# Patient Record
Sex: FEMALE | Race: WHITE | NOT HISPANIC OR LATINO | Employment: UNEMPLOYED | URBAN - NONMETROPOLITAN AREA
[De-identification: names, ages, dates, MRNs, and addresses within clinical notes are randomized per-mention and may not be internally consistent; named-entity substitution may affect disease eponyms.]

---

## 2023-02-03 PROBLEM — M25.572 LEFT ANKLE PAIN: Status: ACTIVE | Noted: 2023-02-03

## 2023-02-03 PROBLEM — M17.11 ARTHRITIS OF KNEE, RIGHT: Status: ACTIVE | Noted: 2023-02-03

## 2023-02-03 PROBLEM — G47.9 SLEEP DISORDER: Status: ACTIVE | Noted: 2023-02-03

## 2023-02-03 PROBLEM — Z86.16 HISTORY OF 2019 NOVEL CORONAVIRUS DISEASE (COVID-19): Status: ACTIVE | Noted: 2023-02-03

## 2023-02-03 PROBLEM — M10.9 GOUT: Status: ACTIVE | Noted: 2023-02-03

## 2023-02-03 PROBLEM — S99.912A LEFT ANKLE INJURY, INITIAL ENCOUNTER: Status: ACTIVE | Noted: 2023-02-03

## 2023-02-03 PROBLEM — K63.9 ARTHRITIS ASSOCIATED WITH INFLAMMATORY BOWEL DISEASE: Status: ACTIVE | Noted: 2023-02-03

## 2023-02-03 PROBLEM — M54.16 ACUTE LUMBAR RADICULOPATHY: Status: ACTIVE | Noted: 2023-02-03

## 2023-02-03 PROBLEM — R60.9: Status: ACTIVE | Noted: 2023-02-03

## 2023-02-03 PROBLEM — R00.0 SINUS TACHYCARDIA: Status: ACTIVE | Noted: 2023-02-03

## 2023-02-03 PROBLEM — R05.3 CHRONIC COUGH: Status: ACTIVE | Noted: 2023-02-03

## 2023-02-03 PROBLEM — G47.33 OBSTRUCTIVE SLEEP APNEA: Status: ACTIVE | Noted: 2023-02-03

## 2023-02-03 PROBLEM — R42 DIZZINESS: Status: ACTIVE | Noted: 2023-02-03

## 2023-02-03 PROBLEM — S82.832A CLOSED FRACTURE OF LEFT DISTAL FIBULA: Status: ACTIVE | Noted: 2023-02-03

## 2023-02-03 PROBLEM — M51.26 DISCOGENIC SYNDROME, LUMBAR: Status: ACTIVE | Noted: 2023-02-03

## 2023-02-03 PROBLEM — K64.4 EXTERNAL HEMORRHOIDS: Status: ACTIVE | Noted: 2023-02-03

## 2023-02-03 PROBLEM — M51.360 DISCOGENIC SYNDROME, LUMBAR: Status: ACTIVE | Noted: 2023-02-03

## 2023-02-03 PROBLEM — R60.9 DEPENDENT EDEMA: Status: ACTIVE | Noted: 2023-02-03

## 2023-02-03 PROBLEM — G25.81 RESTLESS LEGS SYNDROME: Status: ACTIVE | Noted: 2023-02-03

## 2023-02-03 PROBLEM — M25.569 PAIN IN KNEE: Status: ACTIVE | Noted: 2023-02-03

## 2023-02-03 PROBLEM — N85.8 FIBROSIS OF UTERUS: Status: ACTIVE | Noted: 2023-02-03

## 2023-02-03 PROBLEM — I10 BENIGN ESSENTIAL HYPERTENSION: Status: ACTIVE | Noted: 2023-02-03

## 2023-02-03 PROBLEM — K51.90 ULCERATIVE COLITIS (MULTI): Status: ACTIVE | Noted: 2023-02-03

## 2023-02-03 PROBLEM — N28.82 LEFT URETER DILATED: Status: ACTIVE | Noted: 2023-02-03

## 2023-02-03 PROBLEM — R29.898 WEAKNESS OF BOTH LOWER EXTREMITIES: Status: ACTIVE | Noted: 2023-02-03

## 2023-02-03 PROBLEM — M07.60 ARTHRITIS ASSOCIATED WITH INFLAMMATORY BOWEL DISEASE: Status: ACTIVE | Noted: 2023-02-03

## 2023-02-03 PROBLEM — F32.A DEPRESSION: Status: ACTIVE | Noted: 2023-02-03

## 2023-02-03 PROBLEM — R05.9 COUGH: Status: ACTIVE | Noted: 2023-02-03

## 2023-02-03 PROBLEM — T14.8XXA: Status: ACTIVE | Noted: 2023-02-03

## 2023-02-03 PROBLEM — K21.9 ESOPHAGEAL REFLUX: Status: ACTIVE | Noted: 2023-02-03

## 2023-02-03 PROBLEM — E78.00 HYPERCHOLESTEREMIA: Status: RESOLVED | Noted: 2023-02-03 | Resolved: 2023-02-03

## 2023-02-03 PROBLEM — K43.2 VENTRAL INCISIONAL HERNIA: Status: ACTIVE | Noted: 2023-02-03

## 2023-02-03 RX ORDER — LORATADINE 10 MG/1
1 TABLET ORAL AS NEEDED
COMMUNITY

## 2023-02-03 RX ORDER — ROSUVASTATIN CALCIUM 10 MG/1
10 TABLET, COATED ORAL NIGHTLY
COMMUNITY
Start: 2020-03-17 | End: 2023-04-03 | Stop reason: SDUPTHER

## 2023-02-03 RX ORDER — TIZANIDINE 4 MG/1
4 TABLET ORAL EVERY 8 HOURS PRN
COMMUNITY
Start: 2015-04-29 | End: 2023-10-13 | Stop reason: SDUPTHER

## 2023-02-03 RX ORDER — SULFASALAZINE 500 MG/1
500 TABLET ORAL 2 TIMES DAILY
COMMUNITY
Start: 2016-02-22 | End: 2023-10-13 | Stop reason: SDUPTHER

## 2023-02-03 RX ORDER — MUPIROCIN 20 MG/G
2 OINTMENT TOPICAL
COMMUNITY
Start: 2022-07-27 | End: 2023-10-13 | Stop reason: SDUPTHER

## 2023-02-03 RX ORDER — NEBIVOLOL 10 MG/1
1 TABLET ORAL DAILY
COMMUNITY
Start: 2016-08-04 | End: 2023-04-03 | Stop reason: SDUPTHER

## 2023-02-03 RX ORDER — PRAMIPEXOLE DIHYDROCHLORIDE 0.5 MG/1
0.5 TABLET ORAL 3 TIMES DAILY
COMMUNITY
Start: 2015-04-16 | End: 2023-04-03 | Stop reason: SDUPTHER

## 2023-04-02 PROBLEM — Z79.4 DIABETES MELLITUS, TYPE II, INSULIN DEPENDENT (MULTI): Status: ACTIVE | Noted: 2023-04-02

## 2023-04-02 PROBLEM — E11.9 DIABETES MELLITUS, TYPE II, INSULIN DEPENDENT (MULTI): Status: ACTIVE | Noted: 2023-04-02

## 2023-04-02 PROBLEM — Z87.892 HISTORY OF ANAPHYLAXIS: Status: ACTIVE | Noted: 2023-04-02

## 2023-04-02 PROBLEM — E78.00 HYPERCHOLESTEREMIA: Status: ACTIVE | Noted: 2023-04-02

## 2023-04-02 RX ORDER — INSULIN LISPRO 100 [IU]/ML
INJECTION, SOLUTION INTRAVENOUS; SUBCUTANEOUS
COMMUNITY
Start: 2021-04-30

## 2023-04-02 RX ORDER — CHOLECALCIFEROL (VITAMIN D3) 50 MCG
TABLET ORAL
COMMUNITY

## 2023-04-02 RX ORDER — LISINOPRIL 10 MG/1
1 TABLET ORAL DAILY
COMMUNITY
Start: 2013-07-12 | End: 2023-04-03 | Stop reason: SDUPTHER

## 2023-04-02 RX ORDER — FLUTICASONE PROPIONATE 50 MCG
2 SPRAY, SUSPENSION (ML) NASAL DAILY
COMMUNITY
Start: 2013-07-12

## 2023-04-02 RX ORDER — EPINEPHRINE 0.3 MG/.3ML
INJECTION SUBCUTANEOUS
COMMUNITY
Start: 2017-08-21 | End: 2023-10-13 | Stop reason: SDUPTHER

## 2023-04-02 RX ORDER — HYDROXYZINE HYDROCHLORIDE 25 MG/1
25 TABLET, FILM COATED ORAL
COMMUNITY
Start: 2022-10-03

## 2023-04-02 RX ORDER — ASPIRIN 81 MG/1
1 TABLET ORAL DAILY
COMMUNITY
Start: 2019-01-07

## 2023-04-02 RX ORDER — BUPROPION HYDROCHLORIDE 150 MG/1
1 TABLET, EXTENDED RELEASE ORAL EVERY 12 HOURS
COMMUNITY
Start: 2022-10-03 | End: 2023-04-03

## 2023-04-02 RX ORDER — FERROUS SULFATE 325(65) MG
1 TABLET ORAL DAILY
COMMUNITY
Start: 2022-10-03 | End: 2023-10-13 | Stop reason: ALTCHOICE

## 2023-04-02 RX ORDER — INSULIN GLARGINE 100 [IU]/ML
INJECTION, SOLUTION SUBCUTANEOUS
COMMUNITY
Start: 2021-04-30 | End: 2024-04-11 | Stop reason: WASHOUT

## 2023-04-02 RX ORDER — FUROSEMIDE 80 MG/1
0.5 TABLET ORAL
COMMUNITY
Start: 2016-04-19 | End: 2023-10-13 | Stop reason: SDUPTHER

## 2023-04-02 RX ORDER — LANSOPRAZOLE 30 MG/1
CAPSULE, DELAYED RELEASE ORAL
COMMUNITY

## 2023-04-02 RX ORDER — ACETAMINOPHEN 500 MG
500 TABLET ORAL
COMMUNITY
Start: 2022-04-26

## 2023-04-02 RX ORDER — HYDROXYZINE PAMOATE 25 MG/1
1 CAPSULE ORAL 3 TIMES DAILY
COMMUNITY
Start: 2022-10-03 | End: 2023-04-03 | Stop reason: ALTCHOICE

## 2023-04-02 RX ORDER — ALBUTEROL SULFATE 90 UG/1
AEROSOL, METERED RESPIRATORY (INHALATION)
COMMUNITY
Start: 2021-07-16 | End: 2023-10-13 | Stop reason: ALTCHOICE

## 2023-04-03 ENCOUNTER — OFFICE VISIT (OUTPATIENT)
Dept: PRIMARY CARE | Facility: CLINIC | Age: 58
End: 2023-04-03
Payer: COMMERCIAL

## 2023-04-03 VITALS
SYSTOLIC BLOOD PRESSURE: 140 MMHG | WEIGHT: 260 LBS | DIASTOLIC BLOOD PRESSURE: 64 MMHG | OXYGEN SATURATION: 98 % | BODY MASS INDEX: 47.55 KG/M2 | HEART RATE: 74 BPM

## 2023-04-03 DIAGNOSIS — E78.00 HYPERCHOLESTEREMIA: ICD-10-CM

## 2023-04-03 DIAGNOSIS — Z12.11 SCREENING FOR COLON CANCER: ICD-10-CM

## 2023-04-03 DIAGNOSIS — I10 BENIGN ESSENTIAL HYPERTENSION: Primary | ICD-10-CM

## 2023-04-03 DIAGNOSIS — L03.115 CELLULITIS OF RIGHT LOWER EXTREMITY: ICD-10-CM

## 2023-04-03 DIAGNOSIS — F31.77 BIPOLAR DISORDER, IN PARTIAL REMISSION, MOST RECENT EPISODE MIXED (MULTI): Chronic | ICD-10-CM

## 2023-04-03 DIAGNOSIS — G25.81 RESTLESS LEGS SYNDROME: ICD-10-CM

## 2023-04-03 PROBLEM — M54.16 ACUTE LUMBAR RADICULOPATHY: Status: RESOLVED | Noted: 2023-02-03 | Resolved: 2023-04-03

## 2023-04-03 PROBLEM — F32.A DEPRESSION: Status: RESOLVED | Noted: 2023-02-03 | Resolved: 2023-04-03

## 2023-04-03 PROBLEM — S99.912A LEFT ANKLE INJURY, INITIAL ENCOUNTER: Status: RESOLVED | Noted: 2023-02-03 | Resolved: 2023-04-03

## 2023-04-03 PROBLEM — M17.11 ARTHRITIS OF KNEE, RIGHT: Status: RESOLVED | Noted: 2023-02-03 | Resolved: 2023-04-03

## 2023-04-03 PROBLEM — R05.9 COUGH: Status: RESOLVED | Noted: 2023-02-03 | Resolved: 2023-04-03

## 2023-04-03 PROBLEM — S82.832A CLOSED FRACTURE OF LEFT DISTAL FIBULA: Status: RESOLVED | Noted: 2023-02-03 | Resolved: 2023-04-03

## 2023-04-03 PROBLEM — M25.572 LEFT ANKLE PAIN: Status: RESOLVED | Noted: 2023-02-03 | Resolved: 2023-04-03

## 2023-04-03 LAB
ALANINE AMINOTRANSFERASE (SGPT) (U/L) IN SER/PLAS: 14 U/L (ref 7–45)
ALBUMIN (G/DL) IN SER/PLAS: 3.6 G/DL (ref 3.4–5)
ALKALINE PHOSPHATASE (U/L) IN SER/PLAS: 91 U/L (ref 33–110)
ANION GAP IN SER/PLAS: 15 MMOL/L (ref 10–20)
ASPARTATE AMINOTRANSFERASE (SGOT) (U/L) IN SER/PLAS: 12 U/L (ref 9–39)
BASOPHILS (10*3/UL) IN BLOOD BY AUTOMATED COUNT: 0.07 X10E9/L (ref 0–0.1)
BASOPHILS/100 LEUKOCYTES IN BLOOD BY AUTOMATED COUNT: 1.2 % (ref 0–2)
BILIRUBIN TOTAL (MG/DL) IN SER/PLAS: 0.4 MG/DL (ref 0–1.2)
CALCIUM (MG/DL) IN SER/PLAS: 8.6 MG/DL (ref 8.6–10.3)
CARBON DIOXIDE, TOTAL (MMOL/L) IN SER/PLAS: 29 MMOL/L (ref 21–32)
CHLORIDE (MMOL/L) IN SER/PLAS: 100 MMOL/L (ref 98–107)
CREATININE (MG/DL) IN SER/PLAS: 0.95 MG/DL (ref 0.5–1.05)
EOSINOPHILS (10*3/UL) IN BLOOD BY AUTOMATED COUNT: 0.22 X10E9/L (ref 0–0.7)
EOSINOPHILS/100 LEUKOCYTES IN BLOOD BY AUTOMATED COUNT: 3.7 % (ref 0–6)
ERYTHROCYTE DISTRIBUTION WIDTH (RATIO) BY AUTOMATED COUNT: 12.3 % (ref 11.5–14.5)
ERYTHROCYTE MEAN CORPUSCULAR HEMOGLOBIN CONCENTRATION (G/DL) BY AUTOMATED: 32 G/DL (ref 32–36)
ERYTHROCYTE MEAN CORPUSCULAR VOLUME (FL) BY AUTOMATED COUNT: 88 FL (ref 80–100)
ERYTHROCYTES (10*6/UL) IN BLOOD BY AUTOMATED COUNT: 4.49 X10E12/L (ref 4–5.2)
GFR FEMALE: 69 ML/MIN/1.73M2
GLUCOSE (MG/DL) IN SER/PLAS: 237 MG/DL (ref 74–99)
HEMATOCRIT (%) IN BLOOD BY AUTOMATED COUNT: 39.7 % (ref 36–46)
HEMOGLOBIN (G/DL) IN BLOOD: 12.7 G/DL (ref 12–16)
IMMATURE GRANULOCYTES/100 LEUKOCYTES IN BLOOD BY AUTOMATED COUNT: 1 % (ref 0–0.9)
LEUKOCYTES (10*3/UL) IN BLOOD BY AUTOMATED COUNT: 6 X10E9/L (ref 4.4–11.3)
LYMPHOCYTES (10*3/UL) IN BLOOD BY AUTOMATED COUNT: 1.35 X10E9/L (ref 1.2–4.8)
LYMPHOCYTES/100 LEUKOCYTES IN BLOOD BY AUTOMATED COUNT: 22.4 % (ref 13–44)
MONOCYTES (10*3/UL) IN BLOOD BY AUTOMATED COUNT: 0.41 X10E9/L (ref 0.1–1)
MONOCYTES/100 LEUKOCYTES IN BLOOD BY AUTOMATED COUNT: 6.8 % (ref 2–10)
NEUTROPHILS (10*3/UL) IN BLOOD BY AUTOMATED COUNT: 3.91 X10E9/L (ref 1.2–7.7)
NEUTROPHILS/100 LEUKOCYTES IN BLOOD BY AUTOMATED COUNT: 64.9 % (ref 40–80)
PLATELETS (10*3/UL) IN BLOOD AUTOMATED COUNT: 255 X10E9/L (ref 150–450)
POTASSIUM (MMOL/L) IN SER/PLAS: 4.5 MMOL/L (ref 3.5–5.3)
PROTEIN TOTAL: 6.5 G/DL (ref 6.4–8.2)
SODIUM (MMOL/L) IN SER/PLAS: 139 MMOL/L (ref 136–145)
UREA NITROGEN (MG/DL) IN SER/PLAS: 12 MG/DL (ref 6–23)

## 2023-04-03 PROCEDURE — 1036F TOBACCO NON-USER: CPT | Performed by: FAMILY MEDICINE

## 2023-04-03 PROCEDURE — 99214 OFFICE O/P EST MOD 30 MIN: CPT | Performed by: FAMILY MEDICINE

## 2023-04-03 PROCEDURE — 3078F DIAST BP <80 MM HG: CPT | Performed by: FAMILY MEDICINE

## 2023-04-03 PROCEDURE — 3077F SYST BP >= 140 MM HG: CPT | Performed by: FAMILY MEDICINE

## 2023-04-03 PROCEDURE — 36415 COLL VENOUS BLD VENIPUNCTURE: CPT | Performed by: FAMILY MEDICINE

## 2023-04-03 PROCEDURE — 4010F ACE/ARB THERAPY RXD/TAKEN: CPT | Performed by: FAMILY MEDICINE

## 2023-04-03 PROCEDURE — 85025 COMPLETE CBC W/AUTO DIFF WBC: CPT

## 2023-04-03 PROCEDURE — 80053 COMPREHEN METABOLIC PANEL: CPT

## 2023-04-03 RX ORDER — ROSUVASTATIN CALCIUM 10 MG/1
10 TABLET, COATED ORAL NIGHTLY
Qty: 90 TABLET | Refills: 1 | Status: SHIPPED | OUTPATIENT
Start: 2023-04-03

## 2023-04-03 RX ORDER — PRAMIPEXOLE DIHYDROCHLORIDE 0.5 MG/1
0.5 TABLET ORAL 3 TIMES DAILY
Qty: 180 TABLET | Refills: 1 | Status: SHIPPED | OUTPATIENT
Start: 2023-04-03 | End: 2023-04-05 | Stop reason: SDUPTHER

## 2023-04-03 RX ORDER — LISINOPRIL 10 MG/1
10 TABLET ORAL DAILY
Qty: 90 TABLET | Refills: 1 | Status: SHIPPED | OUTPATIENT
Start: 2023-04-03 | End: 2023-10-13 | Stop reason: SDUPTHER

## 2023-04-03 RX ORDER — NEBIVOLOL 10 MG/1
10 TABLET ORAL DAILY
Qty: 90 TABLET | Refills: 1 | Status: SHIPPED | OUTPATIENT
Start: 2023-04-03

## 2023-04-03 RX ORDER — LAMOTRIGINE 200 MG/1
1 TABLET ORAL DAILY
COMMUNITY
Start: 2023-01-12

## 2023-04-03 RX ORDER — CEPHALEXIN 500 MG/1
500 CAPSULE ORAL 3 TIMES DAILY
Qty: 21 CAPSULE | Refills: 0 | Status: SHIPPED | OUTPATIENT
Start: 2023-04-03 | End: 2023-04-10

## 2023-04-03 RX ORDER — DULOXETIN HYDROCHLORIDE 60 MG/1
1 CAPSULE, DELAYED RELEASE ORAL 2 TIMES DAILY
COMMUNITY
Start: 2022-08-12 | End: 2023-04-03

## 2023-04-03 RX ORDER — QUETIAPINE FUMARATE 200 MG/1
400 TABLET, FILM COATED ORAL NIGHTLY
COMMUNITY
Start: 2023-01-12

## 2023-04-03 NOTE — PATIENT INSTRUCTIONS
Contact Dr Vásquez about the leg and swelling   Take the furosemide 40 mg a day as needed for swelling   Lets have you take the Cephalexin  500 mg three times a day for a week for the cellulitis on the leg       Call DR Huffman about the colonoscopy  - they might call you       I will have your test results on your secureach card within a week  - if not - call me         [facility] 's   (Breast Cancer and Cervical Cancer Prevention )  phone number  is [phone].     This is a program who will cover the costs of breast and pelvic exams, pap smears, and mammograms, as well as the follow up to any abnormalities found with those tests.      They  help women who are uninsured or under insured.     If you haven't yet,  please give them a call to see if you qualify for the program.    If you do,  they will have paperwork for you to fill out and send back.     If you have qualified for the program,  they will have to set up all appointments for you.     If you are [place], the [facility] ( an [facility]) sponsors periodic clinics with [facility].  You can ask the [facility] people if you could have an appointment there  OR  in the office with me.       If you are non-[place], your visit will only be with me, here in the office.      If you already have your order for a mammogram,  be sure to contact them to set up the mammogram appointment.     Be sure to take your mammogram order with you to your appointment, along with any [facility] paperwork you may have.     If you have already had your testing, you will always hear about your results.  So if 3 weeks go by, and you have not heard anything, please let either myself or the people at [facility] know.     And,  please tell any of your friends who may not have insurance about this fantastic program!         PLEASE PLAN YOUR NEXT APPOINTMENT WITH ME IN      4 months       ;   ALWAYS BE SURE TO CALL AT LEAST 6 WEEKS AHEAD OF WHEN YOU NEED THE APPOINTMENT TO BE SCHEDULED.      IF  I HAVE TOLD YOU TO GET LABS AHEAD OF THE APPOINTMENT WITH ME TO GO OVER TOGETHER AT OUR APPOINTMENT,  BE SURE TO MAKE A LAB APPOINTMENT A FEW DAYS AHEAD OF YOUR VISIT WITH ME, AND LEAVE ME A MESSAGE CLOSE TO THE LAB APPT DATE TO REMIND ME TO PLACE ORDERS FOR THOSE LABS.      THANK YOU!

## 2023-04-03 NOTE — PROGRESS NOTES
Subjective   Patient ID: Yessy Ortega is a 57 y.o. female who presents for Follow-up.    HPI     LOV  10/2022      Updates and concerns:       At our appt in OCT  -  Dx with Bipoar  -  started on Lamictal 200 mg every day  and Seroquel   400 hs  - she feels so much better.   Mood much more stable.   Feeling a lot better     Recently saw her endocrinologist -  A1C to 8.4% -   Sugars are finally getting under better control -   She is able to control how she eats better       Needs to get in for colonoscopy       When she works - in the PM she feels flulike  -   Gets chills   Going on for a year     When she rests -  she feels better       Chronic issues reviewed today :      Diabetes T2 -  seeing DR Shah  A1C  8.4%   ON Lantus - 80 units AM   Humalog - per scale 30 units up to 50 units - may need correction at time   Farxiga started too - OFF FARXIGA - COST   Meter is Freestyle Erika - LOVES IT   Eye doctor - there Summer 2022   Feet - doing well - checked by Endocrine   Working with dietician      ON ASA 81 mg a day      HTN - Bystolic 10 mg a day; Lisinopril 10 mg QD  States BPs at home are bettter -  Friday  110/72      Hyperchol - Rosuvastatin 10 mg - tolerating well  simvastatin 20 mg CAUSED ACHES         SHE DID HAVE COVID 19 IN MAY 2020 - CT of chest May 13/2021- less than 10 % lung involvement now   several other findings -   7 mm non -obstructing kidney stone;   Calcified nodule on thyroid     HER SOB IS MUCH BETTER - NEEDS ALBUTEROL OCCAS      Edema -   Saw Dr Mohseni - Dx Lymphedema - blisters on legs - hx of cellulitis   Was in a unaboot -  and having her veins worked on  -   Right was done -  but swelling worse  - blisters not going away  -       On furosemide 40 QD  - has not been taking it per DR WHARTON  Has sequential compression devices QD     Bipolar d/o Dx  2022  psych with Francisco, counselor monthly   Now on Lamictal and Seroquel  - doing much better  Hydroxyzine prn      Tizanadine for back  pain - 4 mg hs usually      Sleep study 1/2020 - WNL      RLS - pramipexole 0.5 mg -   Usually taking 2 hs now   And on iron daily      Arthritis of IBD (UC) - saw Lumapas in the past - on sulfasalazine - 2 pills BID   was on prednisone - could not take it   Colonoscopy - last one was about 10 years ago  - saw DiBlasio in the past   Has not seen GI  in years   - last one in chart 2008      GERD - prevacid daily      Taking Vit D 3 - 5000 IU a day      WWE -   9/2020 with [facility] - pap and mamm fine         At June appt 2022  - saw GARRY FOR Postmenopausal Uterine BLEEDING AND SHE WAS GETTING A BIOPSY DONE ON THE UTERUS         Flu shot -  UTD  Pneumovax - had not done yet - declines for now  shingrix - declines for now   COVID - not yet - declines it      Cor Calcium score zero 7/2019         Review of Systems    Objective   /64 (BP Location: Right arm, Patient Position: Sitting, BP Cuff Size: Large adult)   Pulse 74   Wt 118 kg (260 lb)   SpO2 98%   BMI 47.55 kg/m²     Physical Exam  Constitutional:       General: She is not in acute distress.     Appearance: Normal appearance. She is obese.   HENT:      Head: Normocephalic and atraumatic.      Nose: Nose normal.      Mouth/Throat:      Pharynx: No posterior oropharyngeal erythema.   Eyes:      Extraocular Movements: Extraocular movements intact.      Conjunctiva/sclera: Conjunctivae normal.      Pupils: Pupils are equal, round, and reactive to light.   Cardiovascular:      Rate and Rhythm: Normal rate and regular rhythm.      Heart sounds: Normal heart sounds. No murmur heard.  Pulmonary:      Effort: Pulmonary effort is normal. No respiratory distress.      Breath sounds: Normal breath sounds. No wheezing.   Musculoskeletal:      Cervical back: Neck supple.      Right lower leg: Edema present.      Comments: 2 plus edema LE and mild erythema and tenderness right LE    Lymphadenopathy:      Cervical: No cervical adenopathy.   Skin:     General: Skin is  warm and dry.      Findings: No rash.   Neurological:      General: No focal deficit present.      Mental Status: She is alert.   Psychiatric:         Mood and Affect: Mood normal.         Thought Content: Thought content normal.         Assessment/Plan   Problem List Items Addressed This Visit          Nervous    Restless legs syndrome    Relevant Medications    pramipexole (Mirapex) 0.5 mg tablet       Circulatory    Benign essential hypertension - Primary    Relevant Medications    lisinopril 10 mg tablet    nebivolol (Bystolic) 10 mg tablet    Other Relevant Orders    CBC and Auto Differential    Comprehensive Metabolic Panel       Musculoskeletal    Cellulitis of right lower extremity     Start Cephalexin again - she said it worked well in the past -   Working with venous doc          Relevant Medications    cephalexin (Keflex) 500 mg capsule       Other    Bipolar disorder, in partial remission, most recent episode mixed (CMS/HCC) (Chronic)    Hypercholesteremia    Relevant Medications    rosuvastatin (Crestor) 10 mg tablet    Screening for colon cancer    Relevant Orders    Colonoscopy     Generally doing much better -     No changes in meds     We discussed at visit any disease processes that were of concern as well as the risks, benefits and instructions of any new medication provided.    See orders and discussion section for information handed to patient on their Clinical Summary.   Patient (and/or caretaker of patient if present)  stated all questions were answered, and they voiced understanding of instructions.

## 2023-04-05 DIAGNOSIS — G25.81 RESTLESS LEGS SYNDROME: ICD-10-CM

## 2023-04-05 RX ORDER — PRAMIPEXOLE DIHYDROCHLORIDE 0.5 MG/1
0.5 TABLET ORAL 3 TIMES DAILY
Qty: 270 TABLET | Refills: 1 | Status: SHIPPED | OUTPATIENT
Start: 2023-04-05 | End: 2023-12-18

## 2023-09-18 PROBLEM — E66.8 OTHER OBESITY: Status: ACTIVE | Noted: 2023-09-18

## 2023-09-18 PROBLEM — E11.65 TYPE 2 DIABETES MELLITUS WITH HYPERGLYCEMIA (MULTI): Status: ACTIVE | Noted: 2023-09-18

## 2023-09-18 PROBLEM — E66.89 OTHER OBESITY: Status: ACTIVE | Noted: 2023-09-18

## 2023-09-18 PROBLEM — M81.0 AGE-RELATED OSTEOPOROSIS WITHOUT CURRENT PATHOLOGICAL FRACTURE: Status: ACTIVE | Noted: 2023-09-18

## 2023-09-18 RX ORDER — ROSUVASTATIN CALCIUM 20 MG/1
1 TABLET, COATED ORAL
COMMUNITY
End: 2023-10-13 | Stop reason: SINTOL

## 2023-09-18 RX ORDER — QUETIAPINE 200 MG/1
1 TABLET, FILM COATED, EXTENDED RELEASE ORAL NIGHTLY
COMMUNITY
End: 2023-10-13 | Stop reason: SDUPTHER

## 2023-09-18 RX ORDER — TRAZODONE HYDROCHLORIDE 50 MG/1
TABLET ORAL NIGHTLY
COMMUNITY
End: 2023-10-13

## 2023-09-18 RX ORDER — DULOXETIN HYDROCHLORIDE 60 MG/1
1 CAPSULE, DELAYED RELEASE ORAL 2 TIMES DAILY
COMMUNITY
End: 2023-10-13

## 2023-09-18 RX ORDER — BUPROPION HYDROCHLORIDE 150 MG/1
1 TABLET, EXTENDED RELEASE ORAL
COMMUNITY
End: 2023-10-13

## 2023-09-18 RX ORDER — TIZANIDINE 4 MG/1
1 TABLET ORAL NIGHTLY
COMMUNITY
End: 2023-10-13 | Stop reason: SDUPTHER

## 2023-09-18 RX ORDER — HYDROXYZINE PAMOATE 25 MG/1
1 CAPSULE ORAL NIGHTLY
COMMUNITY
End: 2023-10-13 | Stop reason: SDUPTHER

## 2023-09-18 RX ORDER — SULFASALAZINE 500 MG/1
1 TABLET ORAL DAILY
COMMUNITY
End: 2023-10-13 | Stop reason: ALTCHOICE

## 2023-10-13 ENCOUNTER — OFFICE VISIT (OUTPATIENT)
Dept: PRIMARY CARE | Facility: CLINIC | Age: 58
End: 2023-10-13
Payer: COMMERCIAL

## 2023-10-13 VITALS
OXYGEN SATURATION: 96 % | SYSTOLIC BLOOD PRESSURE: 128 MMHG | BODY MASS INDEX: 47.01 KG/M2 | WEIGHT: 257 LBS | DIASTOLIC BLOOD PRESSURE: 86 MMHG | HEART RATE: 67 BPM

## 2023-10-13 DIAGNOSIS — Z79.4 TYPE 2 DIABETES MELLITUS WITH HYPERGLYCEMIA, WITH LONG-TERM CURRENT USE OF INSULIN (MULTI): ICD-10-CM

## 2023-10-13 DIAGNOSIS — R60.9 DEPENDENT EDEMA: ICD-10-CM

## 2023-10-13 DIAGNOSIS — L03.115 CELLULITIS OF RIGHT LOWER EXTREMITY: Primary | ICD-10-CM

## 2023-10-13 DIAGNOSIS — K63.9 ARTHRITIS ASSOCIATED WITH INFLAMMATORY BOWEL DISEASE: ICD-10-CM

## 2023-10-13 DIAGNOSIS — I10 BENIGN ESSENTIAL HYPERTENSION: ICD-10-CM

## 2023-10-13 DIAGNOSIS — E11.65 TYPE 2 DIABETES MELLITUS WITH HYPERGLYCEMIA, WITH LONG-TERM CURRENT USE OF INSULIN (MULTI): ICD-10-CM

## 2023-10-13 DIAGNOSIS — Z87.892 HISTORY OF ANAPHYLAXIS: ICD-10-CM

## 2023-10-13 DIAGNOSIS — M51.26 DISCOGENIC SYNDROME, LUMBAR: ICD-10-CM

## 2023-10-13 DIAGNOSIS — M07.60 ARTHRITIS ASSOCIATED WITH INFLAMMATORY BOWEL DISEASE: ICD-10-CM

## 2023-10-13 LAB
ALBUMIN SERPL BCP-MCNC: 4.1 G/DL (ref 3.4–5)
ALP SERPL-CCNC: 102 U/L (ref 33–110)
ALT SERPL W P-5'-P-CCNC: 19 U/L (ref 7–45)
ANION GAP SERPL CALC-SCNC: 17 MMOL/L (ref 10–20)
AST SERPL W P-5'-P-CCNC: 16 U/L (ref 9–39)
BASOPHILS # BLD AUTO: 0.06 X10*3/UL (ref 0–0.1)
BASOPHILS NFR BLD AUTO: 0.8 %
BILIRUB SERPL-MCNC: 0.4 MG/DL (ref 0–1.2)
BUN SERPL-MCNC: 21 MG/DL (ref 6–23)
CALCIUM SERPL-MCNC: 8.9 MG/DL (ref 8.6–10.3)
CHLORIDE SERPL-SCNC: 101 MMOL/L (ref 98–107)
CHOLEST SERPL-MCNC: 136 MG/DL (ref 0–199)
CHOLESTEROL/HDL RATIO: 2.7
CO2 SERPL-SCNC: 27 MMOL/L (ref 21–32)
CREAT SERPL-MCNC: 1.02 MG/DL (ref 0.5–1.05)
EOSINOPHIL # BLD AUTO: 0.17 X10*3/UL (ref 0–0.7)
EOSINOPHIL NFR BLD AUTO: 2.3 %
ERYTHROCYTE [DISTWIDTH] IN BLOOD BY AUTOMATED COUNT: 12 % (ref 11.5–14.5)
GFR SERPL CREATININE-BSD FRML MDRD: 64 ML/MIN/1.73M*2
GLUCOSE SERPL-MCNC: 154 MG/DL (ref 74–99)
HCT VFR BLD AUTO: 41.8 % (ref 36–46)
HDLC SERPL-MCNC: 50.1 MG/DL
HGB BLD-MCNC: 13.2 G/DL (ref 12–16)
IMM GRANULOCYTES # BLD AUTO: 0.04 X10*3/UL (ref 0–0.7)
IMM GRANULOCYTES NFR BLD AUTO: 0.5 % (ref 0–0.9)
LDLC SERPL CALC-MCNC: 71 MG/DL
LYMPHOCYTES # BLD AUTO: 1.68 X10*3/UL (ref 1.2–4.8)
LYMPHOCYTES NFR BLD AUTO: 23.1 %
MCH RBC QN AUTO: 28.9 PG (ref 26–34)
MCHC RBC AUTO-ENTMCNC: 31.6 G/DL (ref 32–36)
MCV RBC AUTO: 92 FL (ref 80–100)
MONOCYTES # BLD AUTO: 0.43 X10*3/UL (ref 0.1–1)
MONOCYTES NFR BLD AUTO: 5.9 %
NEUTROPHILS # BLD AUTO: 4.9 X10*3/UL (ref 1.2–7.7)
NEUTROPHILS NFR BLD AUTO: 67.4 %
NON HDL CHOLESTEROL: 86 MG/DL (ref 0–149)
NRBC BLD-RTO: 0 /100 WBCS (ref 0–0)
PLATELET # BLD AUTO: 286 X10*3/UL (ref 150–450)
PMV BLD AUTO: 11 FL (ref 7.5–11.5)
POTASSIUM SERPL-SCNC: 4.7 MMOL/L (ref 3.5–5.3)
PROT SERPL-MCNC: 7.2 G/DL (ref 6.4–8.2)
RBC # BLD AUTO: 4.57 X10*6/UL (ref 4–5.2)
SODIUM SERPL-SCNC: 140 MMOL/L (ref 136–145)
TRIGL SERPL-MCNC: 73 MG/DL (ref 0–149)
TSH SERPL-ACNC: 1.8 MIU/L (ref 0.44–3.98)
VLDL: 15 MG/DL (ref 0–40)
WBC # BLD AUTO: 7.3 X10*3/UL (ref 4.4–11.3)

## 2023-10-13 PROCEDURE — 36415 COLL VENOUS BLD VENIPUNCTURE: CPT

## 2023-10-13 PROCEDURE — 3061F NEG MICROALBUMINURIA REV: CPT | Performed by: FAMILY MEDICINE

## 2023-10-13 PROCEDURE — 3048F LDL-C <100 MG/DL: CPT | Performed by: FAMILY MEDICINE

## 2023-10-13 PROCEDURE — 82570 ASSAY OF URINE CREATININE: CPT

## 2023-10-13 PROCEDURE — 84443 ASSAY THYROID STIM HORMONE: CPT

## 2023-10-13 PROCEDURE — 3079F DIAST BP 80-89 MM HG: CPT | Performed by: FAMILY MEDICINE

## 2023-10-13 PROCEDURE — 80053 COMPREHEN METABOLIC PANEL: CPT

## 2023-10-13 PROCEDURE — 85025 COMPLETE CBC W/AUTO DIFF WBC: CPT

## 2023-10-13 PROCEDURE — 4010F ACE/ARB THERAPY RXD/TAKEN: CPT | Performed by: FAMILY MEDICINE

## 2023-10-13 PROCEDURE — 3074F SYST BP LT 130 MM HG: CPT | Performed by: FAMILY MEDICINE

## 2023-10-13 PROCEDURE — 82043 UR ALBUMIN QUANTITATIVE: CPT

## 2023-10-13 PROCEDURE — 90686 IIV4 VACC NO PRSV 0.5 ML IM: CPT | Performed by: FAMILY MEDICINE

## 2023-10-13 PROCEDURE — 90471 IMMUNIZATION ADMIN: CPT | Performed by: FAMILY MEDICINE

## 2023-10-13 PROCEDURE — 80061 LIPID PANEL: CPT

## 2023-10-13 PROCEDURE — 1036F TOBACCO NON-USER: CPT | Performed by: FAMILY MEDICINE

## 2023-10-13 PROCEDURE — 99214 OFFICE O/P EST MOD 30 MIN: CPT | Performed by: FAMILY MEDICINE

## 2023-10-13 RX ORDER — SULFASALAZINE 500 MG/1
500 TABLET ORAL 4 TIMES DAILY
Qty: 240 TABLET | Refills: 5 | Status: SHIPPED | OUTPATIENT
Start: 2023-10-13

## 2023-10-13 RX ORDER — TIZANIDINE 4 MG/1
4 TABLET ORAL NIGHTLY PRN
Qty: 90 TABLET | Refills: 1 | Status: SHIPPED | OUTPATIENT
Start: 2023-10-13

## 2023-10-13 RX ORDER — FUROSEMIDE 40 MG/1
40 TABLET ORAL DAILY PRN
Qty: 90 TABLET | Refills: 3 | Status: SHIPPED | OUTPATIENT
Start: 2023-10-13

## 2023-10-13 RX ORDER — CEPHALEXIN 500 MG/1
500 CAPSULE ORAL 3 TIMES DAILY
Qty: 30 CAPSULE | Refills: 0 | Status: SHIPPED | OUTPATIENT
Start: 2023-10-13 | End: 2023-10-23

## 2023-10-13 RX ORDER — EPINEPHRINE 0.3 MG/.3ML
1 INJECTION SUBCUTANEOUS ONCE AS NEEDED
Qty: 2 EACH | Refills: 2 | Status: SHIPPED | OUTPATIENT
Start: 2023-10-13

## 2023-10-13 RX ORDER — LISINOPRIL 10 MG/1
10 TABLET ORAL DAILY
Qty: 90 TABLET | Refills: 1 | Status: SHIPPED | OUTPATIENT
Start: 2023-10-13 | End: 2024-04-11 | Stop reason: SDUPTHER

## 2023-10-13 RX ORDER — MUPIROCIN 20 MG/G
OINTMENT TOPICAL
Qty: 30 G | Refills: 1 | Status: SHIPPED | OUTPATIENT
Start: 2023-10-13 | End: 2023-12-28 | Stop reason: WASHOUT

## 2023-10-13 RX ORDER — FLASH GLUCOSE SENSOR
KIT MISCELLANEOUS
COMMUNITY
Start: 2023-10-09 | End: 2024-06-06

## 2023-10-13 NOTE — PATIENT INSTRUCTIONS
"For the leg -   Lets try the cephalexin -   Use the mupirocin oint -    Non stick gauze pads -   And then gauze wrap -   Change daily -     Appt with Thalia       You can take the sulfasalazine 500 mg -  2 pills up to 4 times a day when your arthritis is flared     Flu shot today     I will have your test results on your secureach card within a week  - if not  - call me       For General Healthy Nutrition    (Remember - NOT A DIET!   Diets are only good for class reunions.)    These are my general good nutrition recommendations for most people.   I use the term \" diet \"  in these instructions to mean your overall nutrition - how you eat and drink.   If we talked about something different during your visit with me,  other than what is written below,  follow that advice instead.       For most people,  eating healthier means getting less added sugar and less processed foods in your diet    The fresher the better.    Added sugar is now a part of the nutrition label on manufactured food, so you can keep an eye on it easier.    But basically,  foods and beverages  that contain regular sugar and corn syrup are the main sources of added sugars.  Eating as little of these foods as you can is best.   One shocking example of the epidemic of added sugar is soda.    One can of regular soda contains about as much added sugar as 3 regular size doughnuts!     The other issue with processed foods is the amount of processed grains they contain , such as white flour.    This is also something you want to try to limit in your diet.     But, grain products are very important for your nutrition.    Whole grains are better for your body.     Cutting back on white breads, traditional pasta, baked goods, white rice,  and processed cereals will be healthier for you.   The better choices include whole grain breads,  whole wheat pasta,  brown rice, quinoa, barley, steel cut  or rolled oats.   If you eat cereal for breakfast, try to look for " "one made with whole grains and less sugar.   There are many people who have a problem with gluten, for a large variety of reasons.    Generally,  products made with wheat flour , barley or rye are the primary source of gluten.       Cutting back on saturated fats is important.    You want the majority of the meat that you eat to be chicken, fish or turkey.   Baked or broiled is best -  fried adds too much fat.    There are healthy fats that are important - fat is important for holding down appetite, vitamin absorption and several metabolic processes in the body.  Monounsaturated fats raise HDL (good cholesterol) and lower LDL (bad cholesterol).   Olive oil, peanut oil, nuts, seeds, and avocados are great sources of the good fats.       Ideas are:   Trade sour cream dip for hummus (which is rich in olive oil) or guacamole; use veggies or whole-wheat chips to dip.    Nuts are an excellent source of protein and healthy fats.   Tree nuts are the best kind, such as almonds or walnuts.   Just be careful - they are high in calories, so stick to a serving size.  (Most are about 200 calories for a 1/4 cup)      Proteins are very important for your body, and they also hold down your appetite.   Try to have protein with every meal.    These generally are meats, nuts, many beans, legumes, eggs, and dairy.   You will find protein in whole grain products and some green vegetables have a little too.     When you have dairy (if you can - many people are lactose intolerant) try to make it low fat.    Ideas are 1% milk, lowfat yogurt or cheeses, low fat cottage cheese.   I don't generally recommend FAT FREE because they often contain artificial products to improve taste, and the fat helps hold down your appetite.   If you are lactose intolerant, try to see if taking Lactaid before having dairy helps.      Fresh fruits and vegetables are VERY important.  The brighter the better.   Many vegetables are considered \"Free Foods\" - meaning " you can eat as much as you want, and it does not matter.  These include tomatoes, cucumbers, celery, peppers, all the various lettuces and kale - to name a few.   Potatoes, corn and peas are starchy, so do have more calories, but are still healthy - you just want to watch the amount of them you eat.       Fruits are full of wonderful nutrition.   They contain natural sugar called fructose, so eating them in moderation is best.   Diabetics may need to pay careful attention to how their body reacts to the sugar.  Some fruits might drastically increase their blood sugar.      Eating smaller meals with a couple of small snacks is better for your metabolism than not eating for long amounts of time  (breakfast is very important).   Trying to avoid large meals is helpful too.    Eating like this helps keep your appetite down and keeps you in burning metabolism rather than storage metabolism so your body will use the calories you eat.       I do not tell people to stop eating sweets or snack foods - just limit the amounts you have.  The less the better.   Pay attention to serving sizes, and treat them as a treat.        Foods like doughnuts, pop tarts, sugar cereals, cookies  ARE NOT GOOD FOR BREAKFAST.   They are loaded with sugar and will cause you to be hungrier in the day and often not feel well.    Caffeine needs to be limited - no more than 2 servings a day.  Some people can't have any at all.    (if you have any sleep or anxiety issues - stop the caffeine)   Coffee, many teas, many sodas, energy drinks, almost any diet supplement,  and chocolate all contain caffeine.      Water is important.   For most people, 8   x  8 ounces  a day are needed.  This may vary for some health issues.    If you need to be on a low sodium diet, that means looking at labels and eating only 1000 - 2000 mg of sodium a day.    Calcium intake is important.  3 servings of a high calcium food or drink a day is recommended.   This is usually a  cup of milk, a cup of yogurt, an ounce of a hard cheese or 1.5 ounces of a soft cheese are the usual servings.   There are other high calcium foods - including soy or almond milk, broccoli,  almonds, dark green leafy vegetable.   Make sure you are not getting more than 1000  - 1200 mg of total calcium a day (unless you have been told you need more by a doctor).    Vitamin D 3 is important to absorb the calcium and for your immune system.   For children, 400 IU a day is recommended.   For adults - 800 - 5000 IU a day  is recommended.  (Often the amount needed is individualized for adults - be sure to ask how much is right for you)    Physical activity is very important for good health.    Finding activities that give you regular exercise is very important for good health.  Try to find exercise you enjoy doing on a regular basis.    30 minutes at least 5 days a week of a good cardiovascular exercise is recommended.   That means something that gets your heart rate going faster than your usual baseline and you can find yourself breathing harder than usual while you are exercising.  If you have not done any exercise in a long time,  make sure you ask if its safe for you to start,  and be sure to gradually work up to your goal.      If you need to lose weight,  following these recommendations will help you.   And if you are doing all of this and still not losing weight, then its likely just the amount of food you are eating.   Learn to cut back on portion sizes.  Using smaller plates may help.  Healthy weight loss is  only about a pound a week.   You have to remember that whatever you do to lose the weight, you must be prepared to keep it up for life for the weight to stay off.     A lot of people have a lot of luck with using something like a fit bit,  or a program where you keep track of all of your calories that you eat and what you burn off in the day.       PLEASE PLAN YOUR NEXT APPOINTMENT WITH ME IN      4 months        ;   ALWAYS BE SURE TO CALL AT LEAST 6 WEEKS AHEAD OF WHEN YOU NEED THE APPOINTMENT TO BE SCHEDULED.    WE ARE NO LONGER DOING LAB DRAWS WITHOUT AN OFFICE APPOINTMENT.     IF YOU NEED LABS DONE, IT WILL HAVE TO BE AT THE SAME TIME as  THE OFFICE APPOINTMENT.  THANK YOU.

## 2023-10-13 NOTE — PROGRESS NOTES
Subjective   Patient ID: Yessy Ortega is a 58 y.o. female who presents for Follow-up (Yearly check up for blood work. Wound on right lower leg  from bumping it on a wooden chair about 3 weeks, looking pretty bad.).    HPI     LOV    4/2023       Updates and concerns:       She has been seeing endocrine for diabetes     Right lower leg - bumped her right leg -   Small scratches -   Now its getting nasty  - lots of blisters and oozing  Typically gets cephalexin     Known chronic edema -   Has seen DR Vásquez in the past -   Has not been wearing her support stockings       In Sept  - was in Manic phase -   - improving    But she can tell she is in her depressive mode now   Working with psych       Chronic issues reviewed today :      Diabetes T2 -  seeing DR Collins            Last A1C - 8.0    Meds -   ON Lantus - 80 units AM   Humalog - per scale 30 units up to 50 units - may need correction at time   Farxiga started too - OFF FARXIGA - COST   Meter is Freestyle Erika - LOVES IT     Eye doctor - just there - getting DR   Feet - doing well   Stopped working with diabetes educator      ON ASA 81 mg a day      HTN - Bystolic 10 mg a day; Lisinopril 10 mg every day  Stable     Hyperchol - Rosuvastatin 10 mg - tolerating well      (simvastatin 20 mg CAUSED ACHES)      Cor Calcium score zero 7/2019         SHE DID HAVE COVID 19 IN MAY 2020 - CT of chest May 13/2021- less than 10 % lung involvement now  and several other findings -   7 mm non -obstructing kidney stone;   Calcified nodule on thyroid     HER SOB IS MUCH BETTER - not using albuterol      Chronic Edema -   Saw Dr Mohseni - Dx Lymphedema -     ON furosemide 40 every other day   compression devices every day - BID      Bipolar d/o Dx  2022  psych with Francisco, counselor monthly   Now on Lamictal and Seroquel    Hydroxyzine prn     Seeing counselor every 4 weeks         Sleep study 1/2020 - WNL      RLS - pramipexole 0.5 mg -   Usually taking 2 hs now         Arthritis of IBD (UC) - saw Lumapas in the past - on sulfasalazine - 2 pills BID  -   She was told can take 2 QID if inflammation high   Having a lot of joint pain lately   was on prednisone - could not take it     Tizanadine for back pain - 4 mg hs usually       Colonoscopy -   - saw Bill in the past   Has not seen GI  in years   - last one in chart 2008   Still did not go  - has order      GERD - prevacid daily      Taking Vit D 3 - 5000 IU a day       [facility] -      WWE -   9/2020 with [facility] - pap and mamm fine      At June appt 2022  - saw GARRY FOR Postmenopausal Uterine BLEEDING  -   Had Bx done - told it was fine -   No bleeding since         Flu shot - would like one today   Pneumovax - had not done yet - declines for now  shingrix - declines for now   COVID - not yet - declines it            Review of Systems    Objective   /86 (BP Location: Right arm, Patient Position: Sitting, BP Cuff Size: Large adult)   Pulse 67   Wt 117 kg (257 lb)   SpO2 96%   BMI 47.01 kg/m²     Physical Exam  Vitals and nursing note reviewed.   Constitutional:       General: She is not in acute distress.     Appearance: Normal appearance. She is obese.   HENT:      Head: Normocephalic and atraumatic.      Nose: Nose normal.      Mouth/Throat:      Pharynx: No posterior oropharyngeal erythema.   Eyes:      Extraocular Movements: Extraocular movements intact.      Conjunctiva/sclera: Conjunctivae normal.      Pupils: Pupils are equal, round, and reactive to light.   Cardiovascular:      Rate and Rhythm: Normal rate and regular rhythm.      Heart sounds: Normal heart sounds. No murmur heard.  Pulmonary:      Effort: Pulmonary effort is normal. No respiratory distress.      Breath sounds: Normal breath sounds. No wheezing.   Musculoskeletal:         General: Swelling present.      Cervical back: Neck supple.      Right lower leg: Edema present.      Left lower leg: Edema present.        Legs:       Comments: 2 plus edema  LE and mild erythema and tenderness right LE    Lymphadenopathy:      Cervical: No cervical adenopathy.   Skin:     General: Skin is warm and dry.      Findings: No rash.      Comments: On her right LE - she has several superficial vesicles that are raised and white and 2 area that are about 1.5 cm that is white -   The surrounding tissue is erythematous   Neurological:      General: No focal deficit present.      Mental Status: She is alert.   Psychiatric:         Mood and Affect: Mood normal.         Thought Content: Thought content normal.         Assessment/Plan   Problem List Items Addressed This Visit          High    Arthritis associated with inflammatory bowel disease    Relevant Medications    sulfaSALAzine (Azulfidine) 500 mg tablet    Benign essential hypertension    Relevant Medications    lisinopril 10 mg tablet    Dependent edema    Relevant Medications    furosemide (Lasix) 40 mg tablet    Discogenic syndrome, lumbar    Relevant Medications    tiZANidine (Zanaflex) 4 mg tablet       Medium    History of anaphylaxis    Relevant Medications    EPINEPHrine 0.3 mg/0.3 mL injection syringe    Cellulitis of right lower extremity - Primary    Relevant Medications    mupirocin (Bactroban) 2 % ointment    cephalexin (Keflex) 500 mg capsule    Type 2 diabetes mellitus with hyperglycemia (CMS/HCC)    Relevant Orders    Albumin , Urine Random (Completed)    CBC and Auto Differential (Completed)    Comprehensive Metabolic Panel (Completed)    Lipid Panel (Completed)    Thyroid Stimulating Hormone (Completed)     Superficial wounds on right less with cellulitis -   Tx with cephalexin   She will call DR Vásquez again     Mood is still pretty labile - working with psych     Working with endocrine too     I told her to increase sulfasalazine for the pain she is having - that has worked in the past     We discussed at visit any disease processes that were of concern as well as the risks, benefits and instructions of  any new medication provided.    See orders and discussion section for information handed to patient on their Clinical Summary.   Patient (and/or caretaker of patient if present)  stated all questions were answered, and they voiced understanding of instructions.

## 2023-10-14 PROBLEM — E11.319 DIABETIC RETINOPATHY OF BOTH EYES ASSOCIATED WITH TYPE 2 DIABETES MELLITUS (MULTI): Status: ACTIVE | Noted: 2023-10-14

## 2023-10-14 LAB
CREAT UR-MCNC: 73.1 MG/DL
MICROALBUMIN UR-MCNC: 19.8 MG/L
MICROALBUMIN/CREAT UR: 27.1 UG/MG CREAT

## 2023-12-18 DIAGNOSIS — G25.81 RESTLESS LEGS SYNDROME: ICD-10-CM

## 2023-12-18 RX ORDER — PRAMIPEXOLE DIHYDROCHLORIDE 0.5 MG/1
TABLET ORAL
Qty: 270 TABLET | Refills: 3 | Status: SHIPPED | OUTPATIENT
Start: 2023-12-18 | End: 2023-12-20 | Stop reason: SDUPTHER

## 2023-12-20 DIAGNOSIS — G25.81 RESTLESS LEGS SYNDROME: ICD-10-CM

## 2023-12-20 RX ORDER — PRAMIPEXOLE DIHYDROCHLORIDE 0.5 MG/1
TABLET ORAL
Qty: 270 TABLET | Refills: 3 | Status: SHIPPED | OUTPATIENT
Start: 2023-12-20

## 2023-12-27 ENCOUNTER — OFFICE VISIT (OUTPATIENT)
Dept: ENDOCRINOLOGY | Facility: CLINIC | Age: 58
End: 2023-12-27
Payer: COMMERCIAL

## 2023-12-27 VITALS
HEART RATE: 69 BPM | DIASTOLIC BLOOD PRESSURE: 59 MMHG | SYSTOLIC BLOOD PRESSURE: 130 MMHG | BODY MASS INDEX: 47.19 KG/M2 | WEIGHT: 258 LBS

## 2023-12-27 DIAGNOSIS — E11.65 TYPE 2 DIABETES MELLITUS WITH HYPERGLYCEMIA, WITH LONG-TERM CURRENT USE OF INSULIN (MULTI): Primary | ICD-10-CM

## 2023-12-27 DIAGNOSIS — Z79.4 TYPE 2 DIABETES MELLITUS WITH HYPERGLYCEMIA, WITH LONG-TERM CURRENT USE OF INSULIN (MULTI): Primary | ICD-10-CM

## 2023-12-27 DIAGNOSIS — I10 BENIGN ESSENTIAL HYPERTENSION: ICD-10-CM

## 2023-12-27 DIAGNOSIS — E78.00 HYPERCHOLESTEREMIA: ICD-10-CM

## 2023-12-27 LAB — POC HEMOGLOBIN A1C: 7.8 % (ref 4.2–6.5)

## 2023-12-27 PROCEDURE — 99213 OFFICE O/P EST LOW 20 MIN: CPT | Performed by: NURSE PRACTITIONER

## 2023-12-27 PROCEDURE — 3078F DIAST BP <80 MM HG: CPT | Performed by: NURSE PRACTITIONER

## 2023-12-27 PROCEDURE — 4010F ACE/ARB THERAPY RXD/TAKEN: CPT | Performed by: NURSE PRACTITIONER

## 2023-12-27 PROCEDURE — 95251 CONT GLUC MNTR ANALYSIS I&R: CPT | Performed by: NURSE PRACTITIONER

## 2023-12-27 PROCEDURE — 3061F NEG MICROALBUMINURIA REV: CPT | Performed by: NURSE PRACTITIONER

## 2023-12-27 PROCEDURE — 1036F TOBACCO NON-USER: CPT | Performed by: NURSE PRACTITIONER

## 2023-12-27 PROCEDURE — 83036 HEMOGLOBIN GLYCOSYLATED A1C: CPT | Performed by: NURSE PRACTITIONER

## 2023-12-27 PROCEDURE — 3048F LDL-C <100 MG/DL: CPT | Performed by: NURSE PRACTITIONER

## 2023-12-27 PROCEDURE — 3075F SYST BP GE 130 - 139MM HG: CPT | Performed by: NURSE PRACTITIONER

## 2023-12-27 ASSESSMENT — PAIN SCALES - GENERAL: PAINLEVEL: 4

## 2023-12-27 NOTE — PROGRESS NOTES
HPI:  Presents for follow up/metabolic management 57 yo with DM Type 2 diagnosed in her 40s. History Bipolar depression, HTN, HLD, Lymph edema (compression stocking/pump at night). Current A1C 7.8% was 8.0%. Patient testing sugars 6 times day with Erika 14 day sensor/reader. No lows. Following carb controlled diet somewhat and know reasonable carb allowances. Patient able to afford their medications. Patient is exercising/walking longer distance with a walker.         -CGM Freestyle Erika 14 day/reader. Currently on insulin checking BS at least 4 xs a day adjustments made based on readings/frequent visits to manage.         -INSULIN Lantus 83 units Humalog base 30 ISF 10>130         -Metformin intolerant GLP1 intolerant SGLT2 INTOLERANT could not afford TZD unable/edema DPP4 consider         -HTN Lisinopril daily at goal         -STATIN Rousuvastatin LDL           Erika report downloaded and attached time in target increased to 63%, no lows, avg bs 168. Waking up 160s. Spikes with lunch and has improved with dinner.     /59 (BP Location: Left arm, Patient Position: Sitting)   Pulse 69   Wt 117 kg (258 lb)   BMI 47.19 kg/m²     Labs:  Lab Results   Component Value Date    WBC 7.3 10/13/2023    NRBC 0.0 10/13/2023    RBC 4.57 10/13/2023    HGB 13.2 10/13/2023    HCT 41.8 10/13/2023     10/13/2023     Lab Results   Component Value Date    CALCIUM 8.9 10/13/2023    AST 16 10/13/2023    ALKPHOS 102 10/13/2023    BILITOT 0.4 10/13/2023    PROT 7.2 10/13/2023    ALBUMIN 4.1 10/13/2023     10/13/2023    K 4.7 10/13/2023     10/13/2023    CO2 27 10/13/2023    ANIONGAP 17 10/13/2023    BUN 21 10/13/2023    CREATININE 1.02 10/13/2023    GLUCOSE 154 (H) 10/13/2023    ALT 19 10/13/2023    EGFR 64 10/13/2023     Lab Results   Component Value Date    CHOL 136 10/13/2023    TRIG 73 10/13/2023    HDL 50.1 10/13/2023    LDLCALC 71 10/13/2023     Lab Results   Component Value Date    MICROALBCREA 27.1  "10/13/2023     Lab Results   Component Value Date    TSH 1.80 10/13/2023     No results found for: \"[id number]\"  Lab Results   Component Value Date    HGBA1C 7.8 (A) 12/27/2023         Current Outpatient Medications:     acetaminophen (Tylenol) 500 mg tablet, Take 1 tablet (500 mg) by mouth. EVERY 4 TO 6 HOURS AS NEEDED., Disp: , Rfl:     aspirin 81 mg EC tablet, Take 1 tablet (81 mg) by mouth once daily., Disp: , Rfl:     cholecalciferol (Vitamin D-3) 50 MCG (2000 UT) tablet, Vitamin D3 50 MCG (2000 UT) Oral Tablet  Refills: 0     Active, Disp: , Rfl:     EPINEPHrine 0.3 mg/0.3 mL injection syringe, Inject 0.3 mL (0.3 mg) into the muscle 1 time if needed for anaphylaxis for up to 6 doses. Use as directed for anaphylaxis reaction, Disp: 2 each, Rfl: 2    fluticasone (Flonase) 50 mcg/actuation nasal spray, Administer 2 sprays into each nostril once daily., Disp: , Rfl:     FreeStyle Erika 14 Day Sensor kit, use as directed CHANGE EVERY 14 DAYS, Disp: , Rfl:     furosemide (Lasix) 40 mg tablet, Take 1 tablet (40 mg) by mouth once daily as needed (edema)., Disp: 90 tablet, Rfl: 3    hydrOXYzine HCL (Atarax) 25 mg tablet, Take 1 tablet (25 mg) by mouth. EVERY 4 TO 6 HOURS AS NEEDED FOR ANXIETY., Disp: , Rfl:     insulin glargine (Lantus) 100 unit/mL (3 mL) pen, Inject under the skin., Disp: , Rfl:     insulin lispro (HumaLOG) 100 unit/mL injection, HumaLOG KwikPen 100 UNIT/ML Subcutaneous Solution Pen-injector  Refills: 0      Start : 30-Apr-2021  Active 3 ML Pen, Disp: , Rfl:     lamoTRIgine (LaMICtal) 200 mg tablet, Take 1 tablet (200 mg) by mouth once daily., Disp: , Rfl:     lansoprazole (Prevacid) 30 mg DR capsule, Prevacid 30 MG Oral Capsule Delayed Release  Refills: 0     Active, Disp: , Rfl:     lisinopril 10 mg tablet, Take 1 tablet (10 mg) by mouth once daily., Disp: 90 tablet, Rfl: 1    loratadine (Claritin) 10 mg tablet, Take 1 tablet (10 mg) by mouth if needed for allergies., Disp: , Rfl:     " multivit-min/iron/folic acid/K (ADULTS MULTIVITAMIN ORAL), Take by mouth. As directed, Disp: , Rfl:     nebivolol (Bystolic) 10 mg tablet, Take 1 tablet (10 mg) by mouth once daily., Disp: 90 tablet, Rfl: 1    pramipexole (Mirapex) 0.5 mg tablet, TAKE 1 TABLET BY MOUTH  IN THE MORNING AND 1 TAB IN THE EVENING AND 1 TAB BEFORE BEDTIME, Disp: 270 tablet, Rfl: 3    QUEtiapine (SEROquel) 200 mg tablet, Take 2 tablets (400 mg) by mouth once daily at bedtime., Disp: , Rfl:     rosuvastatin (Crestor) 10 mg tablet, Take 1 tablet (10 mg) by mouth once daily at bedtime., Disp: 90 tablet, Rfl: 1    sulfaSALAzine (Azulfidine) 500 mg tablet, Take 1 tablet (500 mg) by mouth 4 times a day., Disp: 240 tablet, Rfl: 5    tiZANidine (Zanaflex) 4 mg tablet, Take 1 tablet (4 mg) by mouth as needed at bedtime for muscle spasms., Disp: 90 tablet, Rfl: 1    Review of Systems:  Cardiology: Lightheadedness-denies.  Chest pain-denies.  Leg edema-denies.  Palpitations-denies.  Respiratory: Cough-denies. Shortness of breath-denies.  Wheezing-denies.  Gastroenterology: Constipation-denies.  Diarrhea-denies.  Heartburn-denies.  Endocrinology: Cold intolerance-denies.  Heat intolerance-denies.  Sweats-denies.  Neurology: Headache-denies.  Tremor-denies.  Neuropathy in extremities-denies.  Psychology: Low energy-denies.  Irritability-denies.  Sleep disturbances-denies.    Physical Exam:  General Appearance: pleasant, cooperative, no acute distress  HEENT: no chemosis, no proptosis, no lid lag, no lid retraction  Neck: no lymphadenopathy, no thyromegaly, no dominant thyroid nodules  Heart: no murmurs, regular rate and rhythm, S1 and S2  Lungs: no wheezes, no rhonci, no rales  Extremities: no lower extremity swelling    Assessment and Plan:  1. Type 2 diabetes mellitus with hyperglycemia, with long-term current use of insulin (CMS/[facility])  -continue 83 units  -increased base with dinner to 30 other meals still 25 units base  -very challenging to get A1c  closer 7% but she is trying    - POCT glycosylated hemoglobin (Hb A1C) manually resulted    2. Benign essential hypertension  -stable    3. Hypercholesteremia  -LDL target < 70  -tolerates statin      Follow Up: CNP 4 months    -labs/tests/notes reviewed  -reviewed and counseled patient on medication monitoring and side effects

## 2023-12-27 NOTE — PATIENT INSTRUCTIONS
INSULIN Lantus 83 units CONTINUE AT THIS DOSE     MEAL TIME Humalog BASE  30 UNITS WITH BREAKFAST AND LUNCH WITH SCALE     FOR DINNER 36 UNITS PLUS THE SCALE

## 2024-01-15 DIAGNOSIS — Z79.4 TYPE 2 DIABETES MELLITUS WITH HYPERGLYCEMIA, WITH LONG-TERM CURRENT USE OF INSULIN (MULTI): Primary | ICD-10-CM

## 2024-01-15 DIAGNOSIS — E11.65 TYPE 2 DIABETES MELLITUS WITH HYPERGLYCEMIA, WITH LONG-TERM CURRENT USE OF INSULIN (MULTI): Primary | ICD-10-CM

## 2024-01-15 PROCEDURE — RXMED WILLOW AMBULATORY MEDICATION CHARGE

## 2024-01-15 RX ORDER — INSULIN LISPRO 100 [IU]/ML
INJECTION, SOLUTION INTRAVENOUS; SUBCUTANEOUS
Qty: 90 ML | Refills: 3 | Status: SHIPPED | OUTPATIENT
Start: 2024-01-15 | End: 2025-01-14

## 2024-01-15 RX ORDER — INSULIN GLARGINE 100 [IU]/ML
INJECTION, SOLUTION SUBCUTANEOUS
Qty: 75 ML | Refills: 3 | Status: SHIPPED | OUTPATIENT
Start: 2024-01-15 | End: 2025-01-14

## 2024-01-16 ENCOUNTER — PHARMACY VISIT (OUTPATIENT)
Dept: PHARMACY | Facility: CLINIC | Age: 59
End: 2024-01-16
Payer: COMMERCIAL

## 2024-02-07 ENCOUNTER — TELEPHONE (OUTPATIENT)
Dept: ENDOCRINOLOGY | Facility: CLINIC | Age: 59
End: 2024-02-07
Payer: COMMERCIAL

## 2024-02-07 NOTE — TELEPHONE ENCOUNTER
Patient is having a colonoscopy on Tuesday Feb.13 she is taking humolog(on sliding scale) and 80units of Lantus.She is only allowed clear liquids on Monday please advise

## 2024-02-13 ENCOUNTER — APPOINTMENT (OUTPATIENT)
Dept: PRIMARY CARE | Facility: CLINIC | Age: 59
End: 2024-02-13
Payer: COMMERCIAL

## 2024-04-02 ENCOUNTER — OFFICE VISIT (OUTPATIENT)
Dept: ENDOCRINOLOGY | Facility: CLINIC | Age: 59
End: 2024-04-02
Payer: COMMERCIAL

## 2024-04-02 VITALS
BODY MASS INDEX: 47.55 KG/M2 | SYSTOLIC BLOOD PRESSURE: 134 MMHG | HEART RATE: 74 BPM | DIASTOLIC BLOOD PRESSURE: 70 MMHG | WEIGHT: 260 LBS

## 2024-04-02 DIAGNOSIS — E78.00 HYPERCHOLESTEREMIA: ICD-10-CM

## 2024-04-02 DIAGNOSIS — Z79.4 TYPE 2 DIABETES MELLITUS WITH HYPERGLYCEMIA, WITH LONG-TERM CURRENT USE OF INSULIN (MULTI): Primary | ICD-10-CM

## 2024-04-02 DIAGNOSIS — E11.65 TYPE 2 DIABETES MELLITUS WITH HYPERGLYCEMIA, WITH LONG-TERM CURRENT USE OF INSULIN (MULTI): Primary | ICD-10-CM

## 2024-04-02 DIAGNOSIS — I10 BENIGN ESSENTIAL HYPERTENSION: ICD-10-CM

## 2024-04-02 LAB — POC HEMOGLOBIN A1C: 7.6 % (ref 4.2–6.5)

## 2024-04-02 PROCEDURE — 83036 HEMOGLOBIN GLYCOSYLATED A1C: CPT | Performed by: NURSE PRACTITIONER

## 2024-04-02 PROCEDURE — 3075F SYST BP GE 130 - 139MM HG: CPT | Performed by: NURSE PRACTITIONER

## 2024-04-02 PROCEDURE — 99213 OFFICE O/P EST LOW 20 MIN: CPT | Performed by: NURSE PRACTITIONER

## 2024-04-02 PROCEDURE — 4010F ACE/ARB THERAPY RXD/TAKEN: CPT | Performed by: NURSE PRACTITIONER

## 2024-04-02 PROCEDURE — 3078F DIAST BP <80 MM HG: CPT | Performed by: NURSE PRACTITIONER

## 2024-04-02 RX ORDER — PEN NEEDLE, DIABETIC 30 GX3/16"
1 NEEDLE, DISPOSABLE MISCELLANEOUS 2 TIMES DAILY
Qty: 200 EACH | Refills: 3 | Status: SHIPPED | OUTPATIENT
Start: 2024-04-02

## 2024-04-02 ASSESSMENT — ENCOUNTER SYMPTOMS: DEPRESSION: 0

## 2024-04-02 ASSESSMENT — PAIN SCALES - GENERAL: PAINLEVEL: 6

## 2024-04-02 NOTE — PATIENT INSTRUCTIONS
CHANGE THE BASE AT DINNER FROM 35 UNITS TO 32 UNITS     INCREASE THE LANTUS TO 85 UNITS DAILY

## 2024-04-02 NOTE — PROGRESS NOTES
HPI   Presents for follow up/metabolic management 57 yo with DM Type 2 diagnosed in her 40s. History Bipolar depression, HTN, HLD, Lymph edema (compression stocking/pump at night). Current A1C 7.6% was 7.8%. Patient testing sugars 6 times day with Erika 14 day sensor/reader. No lows. Following carb controlled diet somewhat and know reasonable carb allowances. Patient able to afford their medications. Patient is exercising/walking longer distance with a walker.         -CGM Freestyle Erika 14 day/reader. Currently on insulin checking BS at least 4 xs a day adjustments made based on readings/frequent visits to manage.         -INSULIN Lantus 83 units Humalog base 30 (breakfast/lunch) 36 dinner ISF 10>130         -Metformin intolerant GLP1 intolerant SGLT2 INTOLERANT could not afford TZD unable/edema DPP4 consider         -HTN Lisinopril Bystolic daily at goal         -STATIN Rousuvastatin LDL           Could not download Erika 14 day reader, reviewed time in target 60% 14 days      Current Outpatient Medications:     acetaminophen (Tylenol) 500 mg tablet, Take 1 tablet (500 mg) by mouth. EVERY 4 TO 6 HOURS AS NEEDED., Disp: , Rfl:     aspirin 81 mg EC tablet, Take 1 tablet (81 mg) by mouth once daily., Disp: , Rfl:     cholecalciferol (Vitamin D-3) 50 MCG (2000 UT) tablet, Vitamin D3 50 MCG (2000 UT) Oral Tablet  Refills: 0     Active, Disp: , Rfl:     EPINEPHrine 0.3 mg/0.3 mL injection syringe, Inject 0.3 mL (0.3 mg) into the muscle 1 time if needed for anaphylaxis for up to 6 doses. Use as directed for anaphylaxis reaction, Disp: 2 each, Rfl: 2    fluticasone (Flonase) 50 mcg/actuation nasal spray, Administer 2 sprays into each nostril once daily., Disp: , Rfl:     FreeStyle Erika 14 Day Sensor kit, use as directed CHANGE EVERY 14 DAYS, Disp: , Rfl:     furosemide (Lasix) 40 mg tablet, Take 1 tablet (40 mg) by mouth once daily as needed (edema)., Disp: 90 tablet, Rfl: 3    hydrOXYzine HCL (Atarax) 25 mg tablet,  "Take 1 tablet (25 mg) by mouth. EVERY 4 TO 6 HOURS AS NEEDED FOR ANXIETY., Disp: , Rfl:     insulin glargine (Lantus Solostar U-100 Insulin) 100 unit/mL (3 mL) pen, INJECT UP TO 80 UNITS UNDER THE SKIN ONE TIME DAILY AS DIRECTED, Disp: 75 mL, Rfl: 3    insulin glargine (Lantus) 100 unit/mL (3 mL) pen, Inject under the skin., Disp: , Rfl:     insulin lispro (HumaLOG KwikPen Insulin) 100 unit/mL injection, INJECT SUBCUTANEOUSLY UP  UNITS ONE TIME DAILY, Disp: 90 mL, Rfl: 3    insulin lispro (HumaLOG) 100 unit/mL injection, HumaLOG KwikPen 100 UNIT/ML Subcutaneous Solution Pen-injector  Refills: 0      Start : 30-Apr-2021  Active 3 ML Pen, Disp: , Rfl:     lamoTRIgine (LaMICtal) 200 mg tablet, Take 1 tablet (200 mg) by mouth once daily., Disp: , Rfl:     lansoprazole (Prevacid) 30 mg DR capsule, Prevacid 30 MG Oral Capsule Delayed Release  Refills: 0     Active, Disp: , Rfl:     lisinopril 10 mg tablet, Take 1 tablet (10 mg) by mouth once daily., Disp: 90 tablet, Rfl: 1    loratadine (Claritin) 10 mg tablet, Take 1 tablet (10 mg) by mouth if needed for allergies., Disp: , Rfl:     multivit-min/iron/folic acid/K (ADULTS MULTIVITAMIN ORAL), Take by mouth. As directed, Disp: , Rfl:     nebivolol (Bystolic) 10 mg tablet, Take 1 tablet (10 mg) by mouth once daily., Disp: 90 tablet, Rfl: 1    pen needle, diabetic (BD Griselda 2nd Gen Pen Needle) 32 gauge x 5/32\" needle, Use 1 Pen needle 2 times a day., Disp: 200 each, Rfl: 3    pramipexole (Mirapex) 0.5 mg tablet, TAKE 1 TABLET BY MOUTH  IN THE MORNING AND 1 TAB IN THE EVENING AND 1 TAB BEFORE BEDTIME, Disp: 270 tablet, Rfl: 3    QUEtiapine (SEROquel) 200 mg tablet, Take 2 tablets (400 mg) by mouth once daily at bedtime., Disp: , Rfl:     rosuvastatin (Crestor) 10 mg tablet, Take 1 tablet (10 mg) by mouth once daily at bedtime., Disp: 90 tablet, Rfl: 1    sulfaSALAzine (Azulfidine) 500 mg tablet, Take 1 tablet (500 mg) by mouth 4 times a day., Disp: 240 tablet, Rfl: 5    " tiZANidine (Zanaflex) 4 mg tablet, Take 1 tablet (4 mg) by mouth as needed at bedtime for muscle spasms., Disp: 90 tablet, Rfl: 1      Allergies as of 04/02/2024 - Reviewed 04/02/2024   Allergen Reaction Noted    Ozempic [semaglutide] Diarrhea 04/03/2023    Janumet [sitagliptin phos-metformin] Diarrhea 04/03/2023    Toujeo max u-300 solostar [insulin glargine u-300 conc] Diarrhea 04/03/2023    Dulaglutide Other 02/03/2023    Metformin Diarrhea 02/03/2023    Penicillins Other 02/03/2023    Pioglitazone hcl Hives and Other 09/18/2023    Venlafaxine Diarrhea 02/03/2023         Review of Systems   Cardiology: Lightheadedness-denies.  Chest pain-denies.  Leg edema-denies.  Palpitations-denies.  Respiratory: Cough-denies. Shortness of breath-denies.  Wheezing-denies.  Gastroenterology: Constipation-denies.  Diarrhea-denies.  Heartburn-denies.  Endocrinology: Cold intolerance-denies.  Heat intolerance-denies.  Sweats-denies.  Neurology: Headache-denies.  Tremor-denies.  Neuropathy in extremities-denies.  Psychology: Low energy-denies.  Irritability-denies.  Sleep disturbances-denies.      /70 (BP Location: Right arm, Patient Position: Sitting)   Pulse 74   Wt 118 kg (260 lb)   BMI 47.55 kg/m²       Labs:  Lab Results   Component Value Date    WBC 7.3 10/13/2023    NRBC 0.0 10/13/2023    RBC 4.57 10/13/2023    HGB 13.2 10/13/2023    HCT 41.8 10/13/2023     10/13/2023     Lab Results   Component Value Date    CALCIUM 8.9 10/13/2023    AST 16 10/13/2023    ALKPHOS 102 10/13/2023    BILITOT 0.4 10/13/2023    PROT 7.2 10/13/2023    ALBUMIN 4.1 10/13/2023     10/13/2023    K 4.7 10/13/2023     10/13/2023    CO2 27 10/13/2023    ANIONGAP 17 10/13/2023    BUN 21 10/13/2023    CREATININE 1.02 10/13/2023    GLUCOSE 154 (H) 10/13/2023    ALT 19 10/13/2023    EGFR 64 10/13/2023     Lab Results   Component Value Date    CHOL 136 10/13/2023    TRIG 73 10/13/2023    HDL 50.1 10/13/2023    LDLCALC 71 10/13/2023  "    Lab Results   Component Value Date    MICROALBCREA 27.1 10/13/2023     Lab Results   Component Value Date    TSH 1.80 10/13/2023       Lab Results   Component Value Date    HGBA1C 7.6 (A) 04/02/2024         Physical Exam   General Appearance: pleasant, cooperative, no acute distress  HEENT: no chemosis, no proptosis, no lid lag, no lid retraction  Neck: no lymphadenopathy, no thyromegaly, no dominant thyroid nodules  Heart: no murmurs, regular rate and rhythm, S1 and S2  Lungs: no wheezes, no rhonci, no rales  Extremities: lymphedema BLE      Assessment/Plan   1. Type 2 diabetes mellitus with hyperglycemia, with long-term current use of insulin (CMS/[facility])  -continues to improve A1c  -increased 85 units basal dose  -very challenging to get A1c closer 7% but she is trying  -would benefit from newer Erika reader with alarms   -reviewed target BS    - POCT glycosylated hemoglobin (Hb A1C) manually resulted  - pen needle, diabetic (BD Griselda 2nd Gen Pen Needle) 32 gauge x 5/32\" needle; Use 1 Pen needle 2 times a day.  Dispense: 200 each; Refill: 3    2. Benign essential hypertension  -stable    3. Hypercholesteremia  -tolerates statin      Follow Up: 4 months CNP    -labs/tests/notes reviewed  -reviewed and counseled patient on medication monitoring and side effects  "

## 2024-04-11 ENCOUNTER — OFFICE VISIT (OUTPATIENT)
Dept: PRIMARY CARE | Facility: CLINIC | Age: 59
End: 2024-04-11
Payer: COMMERCIAL

## 2024-04-11 VITALS
OXYGEN SATURATION: 96 % | SYSTOLIC BLOOD PRESSURE: 132 MMHG | BODY MASS INDEX: 47.37 KG/M2 | WEIGHT: 259 LBS | HEART RATE: 87 BPM | DIASTOLIC BLOOD PRESSURE: 66 MMHG

## 2024-04-11 DIAGNOSIS — K51.819 OTHER ULCERATIVE COLITIS WITH COMPLICATION (MULTI): ICD-10-CM

## 2024-04-11 DIAGNOSIS — F31.77 BIPOLAR DISORDER, IN PARTIAL REMISSION, MOST RECENT EPISODE MIXED (MULTI): ICD-10-CM

## 2024-04-11 DIAGNOSIS — E11.319 DIABETIC RETINOPATHY OF BOTH EYES WITHOUT MACULAR EDEMA ASSOCIATED WITH TYPE 2 DIABETES MELLITUS, UNSPECIFIED RETINOPATHY SEVERITY (MULTI): ICD-10-CM

## 2024-04-11 DIAGNOSIS — R40.0 DAYTIME SOMNOLENCE: ICD-10-CM

## 2024-04-11 DIAGNOSIS — R05.3 CHRONIC COUGH: Primary | ICD-10-CM

## 2024-04-11 DIAGNOSIS — I10 BENIGN ESSENTIAL HYPERTENSION: ICD-10-CM

## 2024-04-11 PROCEDURE — 99214 OFFICE O/P EST MOD 30 MIN: CPT | Performed by: FAMILY MEDICINE

## 2024-04-11 PROCEDURE — 3078F DIAST BP <80 MM HG: CPT | Performed by: FAMILY MEDICINE

## 2024-04-11 PROCEDURE — 1036F TOBACCO NON-USER: CPT | Performed by: FAMILY MEDICINE

## 2024-04-11 PROCEDURE — 3075F SYST BP GE 130 - 139MM HG: CPT | Performed by: FAMILY MEDICINE

## 2024-04-11 PROCEDURE — 4010F ACE/ARB THERAPY RXD/TAKEN: CPT | Performed by: FAMILY MEDICINE

## 2024-04-11 RX ORDER — ALBUTEROL SULFATE 90 UG/1
2 AEROSOL, METERED RESPIRATORY (INHALATION) EVERY 4 HOURS PRN
Qty: 8 G | Refills: 5 | Status: SHIPPED | OUTPATIENT
Start: 2024-04-11 | End: 2025-04-11

## 2024-04-11 RX ORDER — LISINOPRIL 10 MG/1
10 TABLET ORAL DAILY
Qty: 90 TABLET | Refills: 1 | Status: SHIPPED | OUTPATIENT
Start: 2024-04-11

## 2024-04-11 NOTE — PATIENT INSTRUCTIONS
Get back to taking the furosemide every other day   Start back on Vit D   Use the albuterol as needed for shortness of breath     I will place order for home sleep test  I have place an order for a sleep study.    They should be calling you to set that up.   If they do not within a week - call [phone] -   Tell them you have an order from me  -  and did not hear from them.     After I get the result back, I will have the  call you to set up a phone appt to go over it.     And then we talk about likely getting you a CPAP/Bipap or Autopap machine pending the results of the test.     We would send an order to a DME company.  After you get the machine - you have to have a follow up appt with me   30 - 90 days after you start your machine to be sure the insurance will pay for it.    IF you cannot tolerate it - I need to hear from you before that.           [facility] 's   (Breast Cancer and Cervical Cancer Prevention )  phone number  is [phone].     This is a program who will cover the costs of breast and pelvic exams, pap smears, and mammograms, as well as the follow up to any abnormalities found with those tests.      They  help women who are uninsured or under insured.     If you haven't yet,  please give them a call to see if you qualify for the program.    If you do,  they will have paperwork for you to fill out and send back.     If you have qualified for the program,  they will have to set up all appointments for you.     If you are [place], the [facility] ( an [place]) sponsors periodic clinics with [facility].  You can ask the [facility] people if you could have an appointment there  OR  in the office with me.       If you are non-[place], your visit will only be with me, here in the office.      If you already have your order for a mammogram,  be sure to contact them to set up the mammogram appointment.     Be sure to take your mammogram order with you to your appointment, along  "with any [facility] paperwork you may have.     If you have already had your testing, you will always hear about your results.  So if 3 weeks go by, and you have not heard anything, please let either myself or the people at [facility] know.           For General Healthy Nutrition    (Remember - NOT A DIET!   Diets are only good for class reunions.)    These are my general good nutrition recommendations for most people.   I use the term \" diet \"  in these instructions to mean your overall nutrition - how you eat and drink.   If we talked about something different during your visit with me,  other than what is written below,  follow that advice instead.       For most people,  eating healthier means getting less added sugar and less processed foods in your diet    The fresher the better.    Added sugar is now a part of the nutrition label on manufactured food, so you can keep an eye on it easier.    But basically,  foods and beverages  that contain regular sugar and corn syrup are the main sources of added sugars.  Eating as little of these foods as you can is best.   One shocking example of the epidemic of added sugar is soda.    One can of regular soda contains about as much added sugar as 3 regular size doughnuts!     The other issue with processed foods is the amount of processed grains they contain , such as white flour.    This is also something you want to try to limit in your diet.     But, grain products are very important for your nutrition.    Whole grains are better for your body.     Cutting back on white breads, traditional pasta, baked goods, white rice,  and processed cereals will be healthier for you.   The better choices include whole grain breads,  whole wheat pasta,  brown rice, quinoa, barley, steel cut  or rolled oats.   If you eat cereal for breakfast, try to look for one made with whole grains and less sugar.   There are many people who have a problem with gluten, for a large variety of reasons.    " "Generally,  products made with wheat flour , barley or rye are the primary source of gluten.       Cutting back on saturated fats is important.    You want the majority of the meat that you eat to be chicken, fish or turkey.   Baked or broiled is best -  fried adds too much fat.    There are healthy fats that are important - fat is important for holding down appetite, vitamin absorption and several metabolic processes in the body.  Monounsaturated fats raise HDL (good cholesterol) and lower LDL (bad cholesterol).   Olive oil, peanut oil, nuts, seeds, and avocados are great sources of the good fats.       Ideas are:   Trade sour cream dip for hummus (which is rich in olive oil) or guacamole; use veggies or whole-wheat chips to dip.    Nuts are an excellent source of protein and healthy fats.   Tree nuts are the best kind, such as almonds or walnuts.   Just be careful - they are high in calories, so stick to a serving size.  (Most are about 200 calories for a 1/4 cup)      Proteins are very important for your body, and they also hold down your appetite.   Try to have protein with every meal.    These generally are meats, nuts, many beans, legumes, eggs, and dairy.   You will find protein in whole grain products and some green vegetables have a little too.     When you have dairy (if you can - many people are lactose intolerant) try to make it low fat.    Ideas are 1% milk, lowfat yogurt or cheeses, low fat cottage cheese.   I don't generally recommend FAT FREE because they often contain artificial products to improve taste, and the fat helps hold down your appetite.   If you are lactose intolerant, try to see if taking Lactaid before having dairy helps.      Fresh fruits and vegetables are VERY important.  The brighter the better.   Many vegetables are considered \"Free Foods\" - meaning you can eat as much as you want, and it does not matter.  These include tomatoes, cucumbers, celery, peppers, all the various lettuces " and kale - to name a few.   Potatoes, corn and peas are starchy, so do have more calories, but are still healthy - you just want to watch the amount of them you eat.       Fruits are full of wonderful nutrition.   They contain natural sugar called fructose, so eating them in moderation is best.   Diabetics may need to pay careful attention to how their body reacts to the sugar.  Some fruits might drastically increase their blood sugar.      Eating smaller meals with a couple of small snacks is better for your metabolism than not eating for long amounts of time  (breakfast is very important).   Trying to avoid large meals is helpful too.    Eating like this helps keep your appetite down and keeps you in burning metabolism rather than storage metabolism so your body will use the calories you eat.       I do not tell people to stop eating sweets or snack foods - just limit the amounts you have.  The less the better.   Pay attention to serving sizes, and treat them as a treat.        Foods like doughnuts, pop tarts, sugar cereals, cookies  ARE NOT GOOD FOR BREAKFAST.   They are loaded with sugar and will cause you to be hungrier in the day and often not feel well.    Caffeine needs to be limited - no more than 2 servings a day.  Some people can't have any at all.    (if you have any sleep or anxiety issues - stop the caffeine)   Coffee, many teas, many sodas, energy drinks, almost any diet supplement,  and chocolate all contain caffeine.      Water is important.   For most people, 8   x  8 ounces  a day are needed.  This may vary for some health issues.    If you need to be on a low sodium diet, that means looking at labels and eating only 1000 - 2000 mg of sodium a day.    Calcium intake is important.  3 servings of a high calcium food or drink a day is recommended.   This is usually a cup of milk, a cup of yogurt, an ounce of a hard cheese or 1.5 ounces of a soft cheese are the usual servings.   There are other high  calcium foods - including soy or almond milk, broccoli,  almonds, dark green leafy vegetable.   Make sure you are not getting more than 1000  - 1200 mg of total calcium a day (unless you have been told you need more by a doctor).    Vitamin D 3 is important to absorb the calcium and for your immune system.   For children, 400 IU a day is recommended.   For adults - 800 - 5000 IU a day  is recommended.  (Often the amount needed is individualized for adults - be sure to ask how much is right for you)    Physical activity is very important for good health.    Finding activities that give you regular exercise is very important for good health.  Try to find exercise you enjoy doing on a regular basis.    30 minutes at least 5 days a week of a good cardiovascular exercise is recommended.   That means something that gets your heart rate going faster than your usual baseline and you can find yourself breathing harder than usual while you are exercising.  If you have not done any exercise in a long time,  make sure you ask if its safe for you to start,  and be sure to gradually work up to your goal.      If you need to lose weight,  following these recommendations will help you.   And if you are doing all of this and still not losing weight, then its likely just the amount of food you are eating.   Learn to cut back on portion sizes.  Using smaller plates may help.  Healthy weight loss is  only about a pound a week.   You have to remember that whatever you do to lose the weight, you must be prepared to keep it up for life for the weight to stay off.     A lot of people have a lot of luck with using something like a fit bit,  or a program where you keep track of all of your calories that you eat and what you burn off in the day.        And,  please tell any of your friends who may not have insurance about this fantastic program!

## 2024-04-11 NOTE — PROGRESS NOTES
Subjective   Patient ID: Yessy Ortega is a 58 y.o. female who presents for Follow-up (4 month check up).    HPI     LOV    10/2023       Updates and concerns:       Always tired    Saw Endocrine -   A1C is better.    She has been seeing endocrine for diabetes     Was supposed to get colonoscopy in FEB -    had appendicitis and PNA - sent hospital  -   Did not get colonoscopy  - trying to reschedule       Known chronic edema -   Has seen DR Vásquez in the past -   Has not been wearing her support stockings  -   Gets too tight around ankles   Has appt end of JUNE   Has the compression machine -   Using it every evening.     Sometimes uses it in the AM     Bipolar -   Working with psychiatry and a counselor   Tiredness is worse - losing interest in crafts again      Falling asleep very easily  - [place] Sleepiness Scale     How likely are your to doze off or fall asleep in the following situations:     0 - no chance, 1 - slight chance, 2 - moderate chance, 3 - high chance    Sitting and reading:  3  Watching Television:   NA   Sitting inactive in a public space:   2  Lying down to rest in the afternoon when circumstances permit:   3  Sitting and talking to someone:  1  Sitting quietly after lunch without alcohol:   3  As a passenger in a car for an hour without a break:    1  In a car, while stopped for a few minutes in traffic:   does not drive     Score - 13  but 2 questions were NA due to being [place]     Snores heavily   Some nights wakes up often          Chronic issues reviewed today :      Diabetes T2 -  seeing DR Collins            Last A1C - 7.6    Meds -   ON Lantus - 84  units AM   Humalog - per scale 30 units up to 50 units - may need correction at time   Farxiga started too - OFF FARXIGA - COST   Meter is Freestyle Erika - LOVES IT     Eye doctor - Sept 2023  - getting      Feet - doing well     Stopped working with diabetes educator      ON ASA 81 mg a day      HTN -   Bystolic 10 mg a day;    Lisinopril 10 mg every day  Stable     Hyperchol - Rosuvastatin 10 mg - tolerating well      (simvastatin 20 mg CAUSED ACHES)      Cor Calcium score zero 7/2019         SHE DID HAVE COVID 19 IN MAY 2020 - CT of chest May 13/2021- less than 10 % lung involvement now  and several other findings -   7 mm non -obstructing kidney stone;   Calcified nodule on thyroid     Gets occas LIN - did not try albuterol      Chronic Edema -   Saw Dr Mohseni - Dx Lymphedema -     ON furosemide 40 every other day  -       NOT TAKEN IN A WHILE   compression devices every day - BID      Bipolar d/o Dx  2022  psych with Francisco, counselor monthly   Now on Lamictal and Seroquel    Hydroxyzine prn     Seeing counselor every 4 weeks         Sleep study 1/2020 - WNL      RLS - pramipexole 0.5 mg -   Usually taking 2 hs now        Arthritis of IBD (UC) - saw Lumapas in the past - on sulfasalazine - 2 pills BID  -   She was told can take 2 QID if inflammation high   Having a lot of joint pain lately   was on prednisone - could not take it     Tizanadine for back pain - 4 mg hs usually       Colonoscopy -   - saw Bill in the past   Has not seen GI  in years   - last one in chart 2008   Still did not go  - has order      GERD - prevacid daily      Taking Vit D 3 - 5000 IU a day  -      NOT TAKING       WAC -      WWE -   9/2020 with [facility] - pap and mamm fine      At April appt 2022  - saw GARRY FOR Postmenopausal Uterine BLEEDING  -   Had Bx done - told it was fine -   No bleeding since         Flu shot -DONE  10/2023   Pneumovax - had not done yet - declines for now  shingrix - declines for now   COVID - not yet - declines it            Review of Systems    Objective   /66 (BP Location: Right arm, Patient Position: Sitting, BP Cuff Size: Large adult)   Pulse 87   Wt 117 kg (259 lb)   SpO2 96%   BMI 47.37 kg/m²     Physical Exam  Vitals reviewed.   Constitutional:       General: She is not in acute distress.     Appearance:  Normal appearance. She is obese.   HENT:      Head: Normocephalic and atraumatic.      Nose: Nose normal.      Mouth/Throat:      Mouth: Mucous membranes are moist.      Pharynx: No posterior oropharyngeal erythema.   Eyes:      Extraocular Movements: Extraocular movements intact.      Conjunctiva/sclera: Conjunctivae normal.      Pupils: Pupils are equal, round, and reactive to light.   Cardiovascular:      Rate and Rhythm: Normal rate and regular rhythm.      Heart sounds: Normal heart sounds. No murmur heard.  Pulmonary:      Effort: Pulmonary effort is normal. No respiratory distress.      Breath sounds: Normal breath sounds. No wheezing.   Musculoskeletal:      Cervical back: No rigidity.   Lymphadenopathy:      Cervical: No cervical adenopathy.   Skin:     General: Skin is warm and dry.      Findings: No rash.   Neurological:      General: No focal deficit present.      Mental Status: She is alert. Mental status is at baseline.   Psychiatric:         Mood and Affect: Mood normal.         Thought Content: Thought content normal.         Assessment/Plan   Problem List Items Addressed This Visit          High    Benign essential hypertension    Relevant Medications    lisinopril 10 mg tablet    Ulcerative colitis (CMS/[facility])     Has been stable - on sulfasalazine -   To get colonoscopy          Diabetic retinopathy of both eyes associated with type 2 diabetes mellitus (CMS/[facility])     Working with endocrine for better sugar control - UTD on eye exam             Medium    Bipolar disorder, in partial remission, most recent episode mixed (CMS/[facility]) (Chronic)     Working with psychiatry - in depressive phase         Chronic cough - Primary    Relevant Medications    albuterol (Ventolin HFA) 90 mcg/actuation inhaler     Other Visit Diagnoses       Daytime somnolence        Relevant Orders    Home sleep apnea test (HSAT)          Medically complicated     Working with DR Vásquez, psychiatry and endocrine   Plans to get a  colonoscopy      Biggest issue today is how tired she is -   Psych meds were increased - could be related to that - but I really think she likely has JULIO C -   Check home study again   She agreed     Labs were great in OCT     We discussed at visit any disease processes that were of concern as well as the risks, benefits and instructions of any new medication provided.    See orders and discussion section for information handed to patient on their Clinical Summary.   Patient (and/or caretaker of patient if present)  stated all questions were answered, and they voiced understanding of instructions.

## 2024-04-15 PROCEDURE — RXMED WILLOW AMBULATORY MEDICATION CHARGE

## 2024-04-16 ENCOUNTER — PHARMACY VISIT (OUTPATIENT)
Dept: PHARMACY | Facility: CLINIC | Age: 59
End: 2024-04-16
Payer: COMMERCIAL

## 2024-04-16 PROCEDURE — RXMED WILLOW AMBULATORY MEDICATION CHARGE

## 2024-04-20 ENCOUNTER — CLINICAL SUPPORT (OUTPATIENT)
Dept: SLEEP MEDICINE | Facility: HOSPITAL | Age: 59
End: 2024-04-20
Payer: COMMERCIAL

## 2024-04-20 DIAGNOSIS — R40.0 DAYTIME SOMNOLENCE: ICD-10-CM

## 2024-04-20 PROCEDURE — 95806 SLEEP STUDY UNATT&RESP EFFT: CPT | Performed by: STUDENT IN AN ORGANIZED HEALTH CARE EDUCATION/TRAINING PROGRAM

## 2024-04-30 ENCOUNTER — APPOINTMENT (OUTPATIENT)
Dept: ENDOCRINOLOGY | Facility: CLINIC | Age: 59
End: 2024-04-30
Payer: COMMERCIAL

## 2024-05-14 PROCEDURE — RXMED WILLOW AMBULATORY MEDICATION CHARGE

## 2024-05-15 ENCOUNTER — PHARMACY VISIT (OUTPATIENT)
Dept: PHARMACY | Facility: CLINIC | Age: 59
End: 2024-05-15
Payer: COMMERCIAL

## 2024-05-15 ENCOUNTER — TELEMEDICINE (OUTPATIENT)
Dept: PRIMARY CARE | Facility: CLINIC | Age: 59
End: 2024-05-15
Payer: COMMERCIAL

## 2024-05-15 DIAGNOSIS — G47.33 MODERATE OBSTRUCTIVE SLEEP APNEA: Primary | ICD-10-CM

## 2024-05-15 PROCEDURE — 4010F ACE/ARB THERAPY RXD/TAKEN: CPT | Performed by: FAMILY MEDICINE

## 2024-05-15 PROCEDURE — 99212 OFFICE O/P EST SF 10 MIN: CPT | Performed by: FAMILY MEDICINE

## 2024-05-15 NOTE — PROGRESS NOTES
Subjective   Patient ID: Yessy Ortega is a 58 y.o. female who presents for No chief complaint on file..    HPI     Virtual or Telephone Consent    A telephone visit (audio only) between the patient (at the originating site) and the provider (at the distant site) was utilized to provide this telehealth service.   Verbal consent was requested and obtained from Yessy Ortega on this date, 05/15/24 for a telehealth visit.      Phone appt today to discuss sleep study     Date of HSAT -   4/20/24    AHI3%  22.4 per hour -   23 apneas and 124 hypopneas with 3% desat    AHI 4%   11.7 events per hour  54 hypopneas with 4% desat     She feels she can use a nasal pillows mask     Mu-ism home - no outlets     Review of Systems    Objective   There were no vitals taken for this visit.    Physical Exam    None    Assessment/Plan   Problem List Items Addressed This Visit    None  Visit Diagnoses         Codes    Moderate obstructive sleep apnea    -  Primary G47.33    Relevant Orders    Positive Airway Pressure (PAP) Therapy          Discussed sleep study -   And Autopap -   She is interested in trying -   Will send order for Autopap  5 - 20 mm H2O   And heated humidification  -   And nasal pillows mask and equipment     Discussed letting me know if any issues.

## 2024-06-06 DIAGNOSIS — Z79.4 TYPE 2 DIABETES MELLITUS WITH HYPERGLYCEMIA, WITH LONG-TERM CURRENT USE OF INSULIN (MULTI): Primary | ICD-10-CM

## 2024-06-06 DIAGNOSIS — E11.65 TYPE 2 DIABETES MELLITUS WITH HYPERGLYCEMIA, WITH LONG-TERM CURRENT USE OF INSULIN (MULTI): Primary | ICD-10-CM

## 2024-06-06 RX ORDER — FLASH GLUCOSE SENSOR
KIT MISCELLANEOUS
Qty: 2 EACH | Refills: 5 | Status: SHIPPED | OUTPATIENT
Start: 2024-06-06

## 2024-06-17 ENCOUNTER — PHARMACY VISIT (OUTPATIENT)
Dept: PHARMACY | Facility: CLINIC | Age: 59
End: 2024-06-17
Payer: COMMERCIAL

## 2024-06-17 PROCEDURE — RXMED WILLOW AMBULATORY MEDICATION CHARGE

## 2024-07-08 DIAGNOSIS — M51.26 DISCOGENIC SYNDROME, LUMBAR: ICD-10-CM

## 2024-07-08 DIAGNOSIS — I10 BENIGN ESSENTIAL HYPERTENSION: ICD-10-CM

## 2024-07-08 RX ORDER — NEBIVOLOL 10 MG/1
10 TABLET ORAL DAILY
Qty: 90 TABLET | Refills: 3 | Status: SHIPPED | OUTPATIENT
Start: 2024-07-08

## 2024-07-08 RX ORDER — TIZANIDINE 4 MG/1
TABLET ORAL
Qty: 90 TABLET | Refills: 3 | Status: SHIPPED | OUTPATIENT
Start: 2024-07-08

## 2024-07-10 PROCEDURE — RXMED WILLOW AMBULATORY MEDICATION CHARGE

## 2024-07-11 ENCOUNTER — PHARMACY VISIT (OUTPATIENT)
Dept: PHARMACY | Facility: CLINIC | Age: 59
End: 2024-07-11
Payer: COMMERCIAL

## 2024-08-13 NOTE — PROGRESS NOTES
HPI   Presents for follow up/metabolic management 59 yo with DM Type 2 diagnosed in her 40s. History Bipolar depression, HTN, HLD, Lymph edema (compression stocking/pump at night). Current A1c 7.5% was 7.6%.   Patient testing sugars 6 times day with Erika 14 day sensor/reader. No lows. Following carb controlled diet somewhat and know reasonable carb allowances. Patient able to afford their medications. Patient is exercising/walking longer distance with a walker.           -CGM Freestyle Erika 14 day/reader. Currently on insulin checking BS at least 4 xs a day adjustments made based on readings/frequent visits to manage.           -INSULIN Lantus 80-84 units Humalog base 30 (breakfast/lunch) 35 dinner ISF 10>130         -Metformin intolerant GLP1 intolerant SGLT2 INTOLERANT, TZD unable/edema, DPP4 consider           -HTN Lisinopril Bystolic daily at goal         -STATIN Rousuvastatin LDL 71           30 days erika 14 data: 70% target, range 0% lows, pattern: mid 100's overnight and waking, low 100's noon, low 200's after some dinners, mid 100's bedtime.        Current Outpatient Medications:     acetaminophen (Tylenol) 500 mg tablet, Take 1 tablet (500 mg) by mouth. EVERY 4 TO 6 HOURS AS NEEDED., Disp: , Rfl:     albuterol (Ventolin HFA) 90 mcg/actuation inhaler, Inhale 2 puffs every 4 hours if needed for wheezing or shortness of breath., Disp: 8 g, Rfl: 5    aspirin 81 mg EC tablet, Take 1 tablet (81 mg) by mouth once daily., Disp: , Rfl:     cholecalciferol (Vitamin D-3) 50 MCG (2000 UT) tablet, Vitamin D3 50 MCG (2000 UT) Oral Tablet  Refills: 0     Active, Disp: , Rfl:     EPINEPHrine 0.3 mg/0.3 mL injection syringe, Inject 0.3 mL (0.3 mg) into the muscle 1 time if needed for anaphylaxis for up to 6 doses. Use as directed for anaphylaxis reaction, Disp: 2 each, Rfl: 2    flash glucose sensor kit (FreeStyle Erika 14 Day Sensor) kit, use as directed CHANGE EVERY 14 DAYS, Disp: 2 each, Rfl: 5    fluticasone  "(Flonase) 50 mcg/actuation nasal spray, Administer 2 sprays into each nostril once daily., Disp: , Rfl:     furosemide (Lasix) 40 mg tablet, Take 1 tablet (40 mg) by mouth once daily as needed (edema)., Disp: 90 tablet, Rfl: 3    hydrOXYzine HCL (Atarax) 25 mg tablet, Take 1 tablet (25 mg) by mouth. EVERY 4 TO 6 HOURS AS NEEDED FOR ANXIETY., Disp: , Rfl:     insulin glargine (Lantus Solostar U-100 Insulin) 100 unit/mL (3 mL) pen, INJECT UP TO 80 UNITS UNDER THE SKIN ONE TIME DAILY AS DIRECTED, Disp: 75 mL, Rfl: 3    insulin lispro (HumaLOG KwikPen Insulin) 100 unit/mL injection, INJECT SUBCUTANEOUSLY UP  UNITS ONE TIME DAILY, Disp: 90 mL, Rfl: 3    lamoTRIgine (LaMICtal) 200 mg tablet, Take 1 tablet (200 mg) by mouth once daily., Disp: , Rfl:     lansoprazole (Prevacid) 30 mg DR capsule, Prevacid 30 MG Oral Capsule Delayed Release  Refills: 0     Active, Disp: , Rfl:     lisinopril 10 mg tablet, Take 1 tablet (10 mg) by mouth once daily., Disp: 90 tablet, Rfl: 1    loratadine (Claritin) 10 mg tablet, Take 1 tablet (10 mg) by mouth if needed for allergies., Disp: , Rfl:     multivit-min/iron/folic acid/K (ADULTS MULTIVITAMIN ORAL), Take by mouth. As directed, Disp: , Rfl:     nebivolol (Bystolic) 10 mg tablet, TAKE ONE TABLET BY MOUTH DAILY, Disp: 90 tablet, Rfl: 3    pen needle, diabetic (BD Griselda 2nd Gen Pen Needle) 32 gauge x 5/32\" needle, Use 1 Pen needle 2 times a day., Disp: 200 each, Rfl: 3    pramipexole (Mirapex) 0.5 mg tablet, TAKE 1 TABLET BY MOUTH  IN THE MORNING AND 1 TAB IN THE EVENING AND 1 TAB BEFORE BEDTIME, Disp: 270 tablet, Rfl: 3    QUEtiapine (SEROquel) 200 mg tablet, Take 2 tablets (400 mg) by mouth once daily at bedtime., Disp: , Rfl:     rosuvastatin (Crestor) 10 mg tablet, Take 1 tablet (10 mg) by mouth once daily at bedtime., Disp: 90 tablet, Rfl: 1    sulfaSALAzine (Azulfidine) 500 mg tablet, Take 1 tablet (500 mg) by mouth 4 times a day., Disp: 240 tablet, Rfl: 5    tiZANidine " "(Zanaflex) 4 mg tablet, TAKE ONE TABLET BY MOUTH AT BEDTIME AS NEEDED FOR MUSCLE SPAMS, Disp: 90 tablet, Rfl: 3    Allergies as of 08/19/2024 - Reviewed 08/19/2024   Allergen Reaction Noted    Ozempic [semaglutide] Diarrhea 04/03/2023    Janumet [sitagliptin phos-metformin] Diarrhea 04/03/2023    Toujeo max u-300 solostar [insulin glargine u-300 conc] Diarrhea 04/03/2023    Dulaglutide Other 02/03/2023    Metformin Diarrhea 02/03/2023    Penicillins Other 02/03/2023    Pioglitazone hcl Hives and Other 09/18/2023    Venlafaxine Diarrhea 02/03/2023       /77 (BP Location: Right arm, Patient Position: Sitting, BP Cuff Size: Adult)   Pulse 72   Wt 118 kg (260 lb 6.4 oz)   BMI 47.63 kg/m²     Labs:   Lab Results   Component Value Date    WBC 7.3 10/13/2023    NRBC 0.0 10/13/2023    RBC 4.57 10/13/2023    HGB 13.2 10/13/2023    HCT 41.8 10/13/2023     10/13/2023     Lab Results   Component Value Date    CALCIUM 8.9 10/13/2023    AST 16 10/13/2023    ALKPHOS 102 10/13/2023    BILITOT 0.4 10/13/2023    PROT 7.2 10/13/2023    ALBUMIN 4.1 10/13/2023     10/13/2023    K 4.7 10/13/2023     10/13/2023    CO2 27 10/13/2023    ANIONGAP 17 10/13/2023    BUN 21 10/13/2023    CREATININE 1.02 10/13/2023    GLUCOSE 154 (H) 10/13/2023    ALT 19 10/13/2023    EGFR 64 10/13/2023     Lab Results   Component Value Date    CHOL 136 10/13/2023    TRIG 73 10/13/2023    HDL 50.1 10/13/2023    LDLCALC 71 10/13/2023     Lab Results   Component Value Date    MICROALBCREA 27.1 10/13/2023     Lab Results   Component Value Date    TSH 1.80 10/13/2023     No results found for: \"[id number]\"  Lab Results   Component Value Date    HGBA1C 7.5 (A) 08/19/2024       Assessment/Plan   1. Type 2 diabetes mellitus with hyperglycemia, with long-term current use of insulin (Multi)  -A1c ordered and reviewed  -labs reviewed; update planned for October with PCP  -kanu 14 day data reviewed with patient (scanned in epic), reviewed " glycemic targets, reinforced looking for patterns and trends; would benefit from real time data with kanu 3 and alerts    -consider 40 units for larger dinners  -increase daily activity as tolerated      2. Benign essential hypertension  -at target    3. Hypercholesteremia  -on statin and tolerating        Follow up: 6 months becca beckman    -labs/tests/notes reviewed  -reviewed and counseled patient on medication monitoring and side effects    Treatment and plan discussed with Dr. Collins. Ruchi DOWLING Certified Diabetes Care and     Medical Decision Making  Complexity of problem: Chronic illness of diabetes mellitus uncontrolled, progressing  Data analyzed and reviewed: Reviewed prior notes, blood glucose data, labs including HgbA1c, lipids, serum chemistries.  Ordered tests.   Risk of complications and morbidities: Is definite because of use of insulin and risk of hypoglycemia.  Prescription medications reviewed and modifications made.  Compliance assessed.  Addressed social determinants of health including food insecurity.

## 2024-08-14 ENCOUNTER — PHARMACY VISIT (OUTPATIENT)
Dept: PHARMACY | Facility: CLINIC | Age: 59
End: 2024-08-14
Payer: COMMERCIAL

## 2024-08-14 PROCEDURE — RXMED WILLOW AMBULATORY MEDICATION CHARGE

## 2024-08-19 ENCOUNTER — APPOINTMENT (OUTPATIENT)
Dept: ENDOCRINOLOGY | Facility: CLINIC | Age: 59
End: 2024-08-19
Payer: COMMERCIAL

## 2024-08-19 VITALS
WEIGHT: 260.4 LBS | HEART RATE: 72 BPM | SYSTOLIC BLOOD PRESSURE: 125 MMHG | BODY MASS INDEX: 47.63 KG/M2 | DIASTOLIC BLOOD PRESSURE: 77 MMHG

## 2024-08-19 DIAGNOSIS — E11.65 TYPE 2 DIABETES MELLITUS WITH HYPERGLYCEMIA, WITH LONG-TERM CURRENT USE OF INSULIN (MULTI): Primary | ICD-10-CM

## 2024-08-19 DIAGNOSIS — E78.00 HYPERCHOLESTEREMIA: ICD-10-CM

## 2024-08-19 DIAGNOSIS — Z79.4 TYPE 2 DIABETES MELLITUS WITH HYPERGLYCEMIA, WITH LONG-TERM CURRENT USE OF INSULIN (MULTI): Primary | ICD-10-CM

## 2024-08-19 DIAGNOSIS — I10 BENIGN ESSENTIAL HYPERTENSION: ICD-10-CM

## 2024-08-19 LAB — POC HEMOGLOBIN A1C: 7.5 % (ref 4.2–6.5)

## 2024-08-19 PROCEDURE — 83036 HEMOGLOBIN GLYCOSYLATED A1C: CPT | Performed by: INTERNAL MEDICINE

## 2024-08-19 PROCEDURE — 99214 OFFICE O/P EST MOD 30 MIN: CPT | Performed by: INTERNAL MEDICINE

## 2024-08-19 RX ORDER — BLOOD-GLUCOSE,RECEIVER,CONT
EACH MISCELLANEOUS
Qty: 1 EACH | Refills: 0 | Status: SHIPPED | OUTPATIENT
Start: 2024-08-19

## 2024-08-19 RX ORDER — BLOOD-GLUCOSE SENSOR
1 EACH MISCELLANEOUS
Qty: 2 EACH | Refills: 5 | Status: SHIPPED | OUTPATIENT
Start: 2024-08-19

## 2024-08-19 ASSESSMENT — PAIN SCALES - GENERAL: PAINLEVEL: 3

## 2024-08-19 NOTE — PROGRESS NOTES
Attestation signed by Clemente Collins MD on 8/19/24 at 11:56 AM.    I, Dr Clemente Collins, have reviewed this progress note, medication list, vital signs, any pertinent lab values, and any CGM data if present with the Certified Diabetes Care and  face to face during this visit today. This note reflects the treatment plan that was made under my direction after reviewing the above mentioned elements while face to face with the patient and CDE.  I personally answered and addressed any questions and concerns the patient had during the visit today.  The CDE entered the data in this note under my direction and I personally reviewed it, signed any lab or medication orders that I instructed to be completed. I am the billing provider for this visit and the level of service was determined by my involvement in the Medical Decision Making Component of this visit while face to face with the patient.

## 2024-08-19 NOTE — PATIENT INSTRUCTIONS
Consider 40 units for larger dinners    Increase daily activity as tolerated    Set Erika 3 reader low alert for 85    Correct blood sugars to 150

## 2024-09-10 ENCOUNTER — PHARMACY VISIT (OUTPATIENT)
Dept: PHARMACY | Facility: CLINIC | Age: 59
End: 2024-09-10
Payer: COMMERCIAL

## 2024-09-10 PROCEDURE — RXMED WILLOW AMBULATORY MEDICATION CHARGE

## 2024-10-09 DIAGNOSIS — E11.65 TYPE 2 DIABETES MELLITUS WITH HYPERGLYCEMIA, WITH LONG-TERM CURRENT USE OF INSULIN: ICD-10-CM

## 2024-10-09 DIAGNOSIS — Z79.4 TYPE 2 DIABETES MELLITUS WITH HYPERGLYCEMIA, WITH LONG-TERM CURRENT USE OF INSULIN: ICD-10-CM

## 2024-10-09 RX ORDER — BLOOD-GLUCOSE SENSOR
1 EACH MISCELLANEOUS
Qty: 2 EACH | Refills: 5 | Status: SHIPPED | OUTPATIENT
Start: 2024-10-09

## 2024-10-10 PROCEDURE — RXMED WILLOW AMBULATORY MEDICATION CHARGE

## 2024-10-11 ENCOUNTER — PHARMACY VISIT (OUTPATIENT)
Dept: PHARMACY | Facility: CLINIC | Age: 59
End: 2024-10-11
Payer: COMMERCIAL

## 2024-10-11 ENCOUNTER — APPOINTMENT (OUTPATIENT)
Dept: PRIMARY CARE | Facility: CLINIC | Age: 59
End: 2024-10-11
Payer: COMMERCIAL

## 2024-10-25 ENCOUNTER — TELEPHONE (OUTPATIENT)
Dept: ENDOCRINOLOGY | Facility: CLINIC | Age: 59
End: 2024-10-25
Payer: COMMERCIAL

## 2024-10-25 DIAGNOSIS — E11.65 TYPE 2 DIABETES MELLITUS WITH HYPERGLYCEMIA, WITH LONG-TERM CURRENT USE OF INSULIN: Primary | ICD-10-CM

## 2024-10-25 DIAGNOSIS — Z79.4 TYPE 2 DIABETES MELLITUS WITH HYPERGLYCEMIA, WITH LONG-TERM CURRENT USE OF INSULIN: Primary | ICD-10-CM

## 2024-10-25 RX ORDER — FLASH GLUCOSE SENSOR
KIT MISCELLANEOUS
Qty: 2 EACH | Refills: 11 | Status: SHIPPED | OUTPATIENT
Start: 2024-10-25

## 2024-11-07 PROCEDURE — RXMED WILLOW AMBULATORY MEDICATION CHARGE

## 2024-11-08 ENCOUNTER — PHARMACY VISIT (OUTPATIENT)
Dept: PHARMACY | Facility: CLINIC | Age: 59
End: 2024-11-08
Payer: COMMERCIAL

## 2024-11-09 ENCOUNTER — PHARMACY VISIT (OUTPATIENT)
Dept: PHARMACY | Facility: CLINIC | Age: 59
End: 2024-11-09

## 2024-11-09 PROCEDURE — RXMED WILLOW AMBULATORY MEDICATION CHARGE

## 2024-11-18 DIAGNOSIS — I10 BENIGN ESSENTIAL HYPERTENSION: ICD-10-CM

## 2024-11-19 RX ORDER — LISINOPRIL 10 MG/1
10 TABLET ORAL DAILY
Qty: 90 TABLET | Refills: 3 | Status: SHIPPED | OUTPATIENT
Start: 2024-11-19

## 2024-11-22 ENCOUNTER — APPOINTMENT (OUTPATIENT)
Dept: PRIMARY CARE | Facility: CLINIC | Age: 59
End: 2024-11-22
Payer: COMMERCIAL

## 2024-11-22 VITALS
HEART RATE: 67 BPM | WEIGHT: 263 LBS | OXYGEN SATURATION: 98 % | DIASTOLIC BLOOD PRESSURE: 58 MMHG | SYSTOLIC BLOOD PRESSURE: 120 MMHG | BODY MASS INDEX: 48.1 KG/M2

## 2024-11-22 DIAGNOSIS — I10 BENIGN ESSENTIAL HYPERTENSION: ICD-10-CM

## 2024-11-22 DIAGNOSIS — G25.81 RESTLESS LEGS SYNDROME: ICD-10-CM

## 2024-11-22 DIAGNOSIS — G47.33 MODERATE OBSTRUCTIVE SLEEP APNEA: ICD-10-CM

## 2024-11-22 DIAGNOSIS — K51.919 ULCERATIVE COLITIS WITH COMPLICATION, UNSPECIFIED LOCATION (MULTI): ICD-10-CM

## 2024-11-22 DIAGNOSIS — R60.9 DEPENDENT EDEMA: ICD-10-CM

## 2024-11-22 DIAGNOSIS — Z12.11 SCREEN FOR COLON CANCER: Primary | ICD-10-CM

## 2024-11-22 LAB
ALBUMIN SERPL BCP-MCNC: 3.8 G/DL (ref 3.4–5)
ALP SERPL-CCNC: 95 U/L (ref 33–136)
ALT SERPL W P-5'-P-CCNC: 21 U/L (ref 7–45)
ANION GAP SERPL CALC-SCNC: 13 MMOL/L (ref 10–20)
AST SERPL W P-5'-P-CCNC: 18 U/L (ref 9–39)
BASOPHILS # BLD AUTO: 0.04 X10*3/UL (ref 0–0.1)
BASOPHILS NFR BLD AUTO: 0.7 %
BILIRUB SERPL-MCNC: 0.5 MG/DL (ref 0–1.2)
BUN SERPL-MCNC: 20 MG/DL (ref 6–23)
CALCIUM SERPL-MCNC: 9 MG/DL (ref 8.6–10.3)
CHLORIDE SERPL-SCNC: 102 MMOL/L (ref 98–107)
CHOLEST SERPL-MCNC: 169 MG/DL (ref 0–199)
CHOLESTEROL/HDL RATIO: 3.7
CO2 SERPL-SCNC: 29 MMOL/L (ref 21–32)
CREAT SERPL-MCNC: 0.99 MG/DL (ref 0.5–1.05)
CREAT UR-MCNC: 92.6 MG/DL (ref 20–320)
EGFRCR SERPLBLD CKD-EPI 2021: 65 ML/MIN/1.73M*2
EOSINOPHIL # BLD AUTO: 0.14 X10*3/UL (ref 0–0.7)
EOSINOPHIL NFR BLD AUTO: 2.4 %
ERYTHROCYTE [DISTWIDTH] IN BLOOD BY AUTOMATED COUNT: 12 % (ref 11.5–14.5)
GLUCOSE SERPL-MCNC: 131 MG/DL (ref 74–99)
HCT VFR BLD AUTO: 43.6 % (ref 36–46)
HDLC SERPL-MCNC: 45.4 MG/DL
HGB BLD-MCNC: 13.7 G/DL (ref 12–16)
IMM GRANULOCYTES # BLD AUTO: 0.03 X10*3/UL (ref 0–0.7)
IMM GRANULOCYTES NFR BLD AUTO: 0.5 % (ref 0–0.9)
LDLC SERPL CALC-MCNC: 99 MG/DL
LYMPHOCYTES # BLD AUTO: 1.27 X10*3/UL (ref 1.2–4.8)
LYMPHOCYTES NFR BLD AUTO: 22.1 %
MCH RBC QN AUTO: 29.7 PG (ref 26–34)
MCHC RBC AUTO-ENTMCNC: 31.4 G/DL (ref 32–36)
MCV RBC AUTO: 95 FL (ref 80–100)
MICROALBUMIN UR-MCNC: <7 MG/L
MICROALBUMIN/CREAT UR: NORMAL MG/G{CREAT}
MONOCYTES # BLD AUTO: 0.39 X10*3/UL (ref 0.1–1)
MONOCYTES NFR BLD AUTO: 6.8 %
NEUTROPHILS # BLD AUTO: 3.88 X10*3/UL (ref 1.2–7.7)
NEUTROPHILS NFR BLD AUTO: 67.5 %
NON HDL CHOLESTEROL: 124 MG/DL (ref 0–149)
NRBC BLD-RTO: 0 /100 WBCS (ref 0–0)
PLATELET # BLD AUTO: 237 X10*3/UL (ref 150–450)
POTASSIUM SERPL-SCNC: 4.2 MMOL/L (ref 3.5–5.3)
PROT SERPL-MCNC: 6.8 G/DL (ref 6.4–8.2)
RBC # BLD AUTO: 4.61 X10*6/UL (ref 4–5.2)
SODIUM SERPL-SCNC: 140 MMOL/L (ref 136–145)
TRIGL SERPL-MCNC: 123 MG/DL (ref 0–149)
TSH SERPL-ACNC: 1.49 MIU/L (ref 0.44–3.98)
VLDL: 25 MG/DL (ref 0–40)
WBC # BLD AUTO: 5.8 X10*3/UL (ref 4.4–11.3)

## 2024-11-22 PROCEDURE — 1036F TOBACCO NON-USER: CPT | Performed by: FAMILY MEDICINE

## 2024-11-22 PROCEDURE — 84443 ASSAY THYROID STIM HORMONE: CPT

## 2024-11-22 PROCEDURE — 85025 COMPLETE CBC W/AUTO DIFF WBC: CPT

## 2024-11-22 PROCEDURE — 82570 ASSAY OF URINE CREATININE: CPT

## 2024-11-22 PROCEDURE — 99214 OFFICE O/P EST MOD 30 MIN: CPT | Performed by: FAMILY MEDICINE

## 2024-11-22 PROCEDURE — 3074F SYST BP LT 130 MM HG: CPT | Performed by: FAMILY MEDICINE

## 2024-11-22 PROCEDURE — 82043 UR ALBUMIN QUANTITATIVE: CPT

## 2024-11-22 PROCEDURE — 3078F DIAST BP <80 MM HG: CPT | Performed by: FAMILY MEDICINE

## 2024-11-22 PROCEDURE — 80053 COMPREHEN METABOLIC PANEL: CPT

## 2024-11-22 PROCEDURE — 80061 LIPID PANEL: CPT

## 2024-11-22 PROCEDURE — 4010F ACE/ARB THERAPY RXD/TAKEN: CPT | Performed by: FAMILY MEDICINE

## 2024-11-22 RX ORDER — FUROSEMIDE 40 MG/1
40 TABLET ORAL DAILY PRN
Qty: 90 TABLET | Refills: 3 | Status: SHIPPED | OUTPATIENT
Start: 2024-11-22

## 2024-11-22 RX ORDER — PRAMIPEXOLE DIHYDROCHLORIDE 0.5 MG/1
TABLET ORAL
Qty: 220 TABLET | Refills: 3 | Status: SHIPPED | OUTPATIENT
Start: 2024-11-22

## 2024-11-22 RX ORDER — NEBIVOLOL 10 MG/1
5 TABLET ORAL DAILY
Start: 2024-11-22

## 2024-11-22 NOTE — PROGRESS NOTES
Subjective   Patient ID: Yessy Ortega is a 59 y.o. female who presents for Follow-up (6 month check up.).    HPI     LOV    4/2024  Did have Telemed appt in May for JULIO C       Updates and concerns:       She has been seeing endocrine for diabetes ;    Psychiatry for bipolar disorder     Feeling dizzy at times - checks pulse and its in the 50's   On nebivolol 10 mg   Sometimes BP low too   BP twice a week -    Usually 120's/50's       She did get the Autopap   Has the nasal pillows  - loves them   Wears it every night at least 4 hours   She feels better with it -   Not as tired and energy level is better   Had a few nights she could not sleep in the bed due to a shoulder injury and when she did not wear it -   Felt much worse.     To see DR Trejo about right shoulder   Injured it when she reached behind her  to get something  - pain since  -   Hard to lift it       Blood sugars are doing better !       Swelling has been the same.   Compression machine did not work over the summer - she got a lot worse           Chronic issues reviewed today :      Diabetes T2 -  seeing DR Collins            Last A1C - 7.5     Meds -   ON Lantus - 80 - 84  units AM   Humalog - per scale 30 units up to 50 units - may need correction at time   Farxiga started too - OFF FARXIGA - COST   Meter is Freestyle Erika - LOVES IT     Eye doctor - Sept 2023  - getting DR - has appt     Feet - doing well     Stopped working with diabetes educator      ON ASA 81 mg a day      HTN -   Bystolic 10 mg a day  Lisinopril 10 mg every day  See above     Hyperchol - Rosuvastatin 10 mg - tolerating well      (simvastatin 20 mg CAUSED ACHES)      Cor Calcium score zero 7/2019         SHE DID HAVE COVID 19 IN MAY 2020 - CT of chest May 13/2021- less than 10 % lung involvement now  and several other findings -   7 mm non -obstructing kidney stone;   Calcified nodule on thyroid     Gets occas LIN - did not try albuterol     JULIO C  -        Date of HSAT  -   4/20/24    AHI3%  22.4 per hour -   23 apneas and 124 hypopneas with 3% desat   AHI 4%   11.7 events per hour  54 hypopneas with 4% desat   Was to be set up with Autopap 5 - 20 mm H2O   And heated humidification  -   And nasal pillows mask and equipment   Did start on Autopap about June 2024  Nasal pillows mask -   Really loves it   Wears at at least 4 hours   [employer]  - likes them   Chronic Edema -   Saw Dr Mohseni - Dx Lymphedema -     ON furosemide 40 every other day  -       compression devices every day - BID      Bipolar d/o Dx  2022  psych with Francisco, counselor monthly   Now on Lamictal and Seroquel    Hydroxyzine prn     Seeing counselor every 2 months          RLS - pramipexole 0.5 mg -   Usually taking 2 hs now , but needs it in the daytime too at times        Arthritis of IBD ([facility]) - saw Anastasiya in the past - on sulfasalazine - 2 pills BID  -   She was told can take 2 QID if inflammation high   Having a lot of joint pain lately   was on prednisone - could not take it     Tizanadine for back pain - 4 mg hs usually       Colonoscopy -   - saw Bill in the past   Has not seen GI  in years   - last one in chart 2008   Still did not go  - has order   Still was not able to get colonoscopy  - not rescheduled      GERD - prevacid daily      Taking Vit D 3 - 5000 IU a day  -      NOT TAKING       WAC -      WWE -   9/2020 with [facility] - pap and mamm fine      At June appt 2022  - saw GARRY FOR Postmenopausal Uterine BLEEDING  -   Had Bx done - told it was fine -   No bleeding since         Flu shot -  UTD   Pneumovax - had not done yet - declines for now  shingrix - declines for now   COVID - not yet - declines it            Review of Systems    Objective   /68 (BP Location: Right arm, Patient Position: Sitting, BP Cuff Size: Large adult)   Pulse 67   Wt 119 kg (263 lb)   SpO2 98%   BMI 48.10 kg/m²     Physical Exam  Vitals reviewed.   Constitutional:       General: She is not  in acute distress.     Appearance: Normal appearance. She is obese.   HENT:      Head: Normocephalic and atraumatic.      Nose: Nose normal.      Mouth/Throat:      Mouth: Mucous membranes are moist.      Pharynx: No posterior oropharyngeal erythema.   Eyes:      Extraocular Movements: Extraocular movements intact.      Conjunctiva/sclera: Conjunctivae normal.      Pupils: Pupils are equal, round, and reactive to light.   Cardiovascular:      Rate and Rhythm: Normal rate and regular rhythm.      Heart sounds: Normal heart sounds. No murmur heard.  Pulmonary:      Effort: Pulmonary effort is normal. No respiratory distress.      Breath sounds: Normal breath sounds. No wheezing.   Musculoskeletal:      Cervical back: No rigidity.   Lymphadenopathy:      Cervical: No cervical adenopathy.   Skin:     General: Skin is warm and dry.      Findings: No rash.   Neurological:      General: No focal deficit present.      Mental Status: She is alert. Mental status is at baseline.   Psychiatric:         Mood and Affect: Mood normal.         Thought Content: Thought content normal.         Assessment/Plan   Problem List Items Addressed This Visit          High    Benign essential hypertension    Relevant Medications    nebivolol (Bystolic) 10 mg tablet    Other Relevant Orders    Albumin-Creatinine Ratio, Urine Random    CBC and Auto Differential    Comprehensive Metabolic Panel    Lipid Panel    Thyroid Stimulating Hormone    Dependent edema    Ulcerative colitis    Restless legs syndrome    Relevant Medications    pramipexole (Mirapex) 0.5 mg tablet       Medium    Moderate obstructive sleep apnea     Other Visit Diagnoses       Screen for colon cancer    -  Primary    Relevant Orders    Colonoscopy Screening; High Risk Patient; inflammatory bowel disease            Medically complicated     Working with  psychiatry and endocrine   And to see ortho     Doing great with Autopap     Pulse and BP periodically low - cut Nebivolol  in 1/2       Plans to get a colonoscopy   - referral given again       Labs today  -   Did not want to get A1C  - to get with endocrine     We discussed at visit any disease processes that were of concern as well as the risks, benefits and instructions of any new medication provided.    See orders and discussion section for information handed to patient on their Clinical Summary.   Patient (and/or caretaker of patient if present)  stated all questions were answered, and they voiced understanding of instructions.

## 2024-11-22 NOTE — PATIENT INSTRUCTIONS
"Cut the nebivolol in 1/2  - 5 mg a day only     Keep an eye on your blood pressures, If they are running over 130 /80 on a regular basis - please make a phone appt to discuss     Can take the Ropinirole one in the AM if needed and 2 in the PM       Call to set up colonoscopy       You will always hear about your test results.     The easiest way, if you have a smart phone or computer access, is to sign up for \"My Chart.\"    The electronic way to see your medical record, test results and visit summaries/instructions.   You will see your test results on this system before I do.   But, I will always make comments on the results when I see them.   If over a week goes by and I have not made comments on them,  please let me know.    Something may have gone wrong and I did not see them.    If you are having issues with getting onto My Chart , the patient support  number is [phone].       If you do not have a smart phone or computer access, I can still leave test results on your [employer] card if you have one,   or we can call or mail you your results.   IF a week goes by and you have not heard about your results,  please let me know.          [facility] 's   (Breast Cancer and Cervical Cancer Prevention )  phone number  is [phone].     This is a program who will cover the costs of breast and pelvic exams, pap smears, and mammograms, as well as the follow up to any abnormalities found with those tests.      They  help women who are uninsured or under insured.     If you haven't yet,  please give them a call to see if you qualify for the program.    If you do,  they will have paperwork for you to fill out and send back.     If you have qualified for the program,  they will have to set up all appointments for you.     If you are [place], the [facility] ( [facility] [place] [facility]) sponsors periodic clinics with [facility].  You can ask the [facility] people if you could have an appointment there  OR  in the office with " "me.       If you are non[place], your visit will only be with me, here in the office.      If you already have your order for a mammogram,  be sure to contact them to set up the mammogram appointment.     Be sure to take your mammogram order with you to your appointment, along with any [facility] paperwork you may have.     If you have already had your testing, you will always hear about your results.  So if 3 weeks go by, and you have not heard anything, please let either myself or the people at [facility] know.     And,  please tell any of your friends who may not have insurance about this fantastic program!           For General Healthy Nutrition    (Remember - NOT A DIET!   Diets are only good for class reunions.)    These are my general good nutrition recommendations for most people.   I use the term \" diet \"  in these instructions to mean your overall nutrition - how you eat and drink.   If we talked about something different during your visit with me,  other than what is written below,  follow that advice instead.       For most people,  eating healthier means getting less added sugar and less processed foods in your diet    The fresher the better.    Added sugar is now a part of the nutrition label on manufactured food, so you can keep an eye on it easier.    But basically,  foods and beverages  that contain regular sugar and corn syrup are the main sources of added sugars.  Eating as little of these foods as you can is best.   One shocking example of the epidemic of added sugar is soda.    One can of regular soda contains about as much added sugar as 3 regular size doughnuts!     The other issue with processed foods is the amount of processed grains they contain , such as white flour.    This is also something you want to try to limit in your diet.     But, grain products are very important for your nutrition.    Whole grains are better for your body.     Cutting back on white breads, traditional pasta, baked goods, " white rice,  and processed cereals will be healthier for you.   The better choices include whole grain breads,  whole wheat pasta,  brown rice, quinoa, barley, steel cut  or rolled oats.   If you eat cereal for breakfast, try to look for one made with whole grains and less sugar.   There are many people who have a problem with gluten, for a large variety of reasons.    Generally,  products made with wheat flour , barley or rye are the primary source of gluten.       Cutting back on saturated fats is important.    You want the majority of the meat that you eat to be chicken, fish or turkey.   Baked or broiled is best -  fried adds too much fat.    There are healthy fats that are important - fat is important for holding down appetite, vitamin absorption and several metabolic processes in the body.  Monounsaturated fats raise HDL (good cholesterol) and lower LDL (bad cholesterol).   Olive oil, peanut oil, nuts, seeds, and avocados are great sources of the good fats.       Ideas are:   Trade sour cream dip for hummus (which is rich in olive oil) or guacamole; use veggies or whole-wheat chips to dip.    Nuts are an excellent source of protein and healthy fats.   Tree nuts are the best kind, such as almonds or walnuts.   Just be careful - they are high in calories, so stick to a serving size.  (Most are about 200 calories for a 1/4 cup)      Proteins are very important for your body, and they also hold down your appetite.   Try to have protein with every meal.    These generally are meats, nuts, many beans, legumes, eggs, and dairy.   You will find protein in whole grain products and some green vegetables have a little too.     When you have dairy (if you can - many people are lactose intolerant) try to make it low fat.    Ideas are 1% milk, lowfat yogurt or cheeses, low fat cottage cheese.   I don't generally recommend FAT FREE because they often contain artificial products to improve taste, and the fat helps hold down  "your appetite.   If you are lactose intolerant, try to see if taking Lactaid before having dairy helps.      Fresh fruits and vegetables are VERY important.  The brighter the better.   Many vegetables are considered \"Free Foods\" - meaning you can eat as much as you want, and it does not matter.  These include tomatoes, cucumbers, celery, peppers, all the various lettuces and kale - to name a few.   Potatoes, corn and peas are starchy, so do have more calories, but are still healthy - you just want to watch the amount of them you eat.       Fruits are full of wonderful nutrition.   They contain natural sugar called fructose, so eating them in moderation is best.   Diabetics may need to pay careful attention to how their body reacts to the sugar.  Some fruits might drastically increase their blood sugar.      Eating smaller meals with a couple of small snacks is better for your metabolism than not eating for long amounts of time  (breakfast is very important).   Trying to avoid large meals is helpful too.    Eating like this helps keep your appetite down and keeps you in burning metabolism rather than storage metabolism so your body will use the calories you eat.       I do not tell people to stop eating sweets or snack foods - just limit the amounts you have.  The less the better.   Pay attention to serving sizes, and treat them as a treat.        Foods like doughnuts, pop tarts, sugar cereals, cookies  ARE NOT GOOD FOR BREAKFAST.   They are loaded with sugar and will cause you to be hungrier in the day and often not feel well.    Caffeine needs to be limited - no more than 2 servings a day.  Some people can't have any at all.    (if you have any sleep or anxiety issues - stop the caffeine)   Coffee, many teas, many sodas, energy drinks, almost any diet supplement,  and chocolate all contain caffeine.      Water is important.   For most people, 8   x  8 ounces  a day are needed.  This may vary for some health " issues.    If you need to be on a low sodium diet, that means looking at labels and eating only 1000 - 2000 mg of sodium a day.    Calcium intake is important.  3 servings of a high calcium food or drink a day is recommended.   This is usually a cup of milk, a cup of yogurt, an ounce of a hard cheese or 1.5 ounces of a soft cheese are the usual servings.   There are other high calcium foods - including soy or almond milk, broccoli,  almonds, dark green leafy vegetable.   Make sure you are not getting more than 1000  - 1200 mg of total calcium a day (unless you have been told you need more by a doctor).    Vitamin D 3 is important to absorb the calcium and for your immune system.   For children, 400 IU a day is recommended.   For adults - 800 - 5000 IU a day  is recommended.  (Often the amount needed is individualized for adults - be sure to ask how much is right for you)    Physical activity is very important for good health.    Finding activities that give you regular exercise is very important for good health.  Try to find exercise you enjoy doing on a regular basis.    30 minutes at least 5 days a week of a good cardiovascular exercise is recommended.   That means something that gets your heart rate going faster than your usual baseline and you can find yourself breathing harder than usual while you are exercising.  If you have not done any exercise in a long time,  make sure you ask if its safe for you to start,  and be sure to gradually work up to your goal.      If you need to lose weight,  following these recommendations will help you.   And if you are doing all of this and still not losing weight, then its likely just the amount of food you are eating.   Learn to cut back on portion sizes.  Using smaller plates may help.  Healthy weight loss is  only about a pound a week.   You have to remember that whatever you do to lose the weight, you must be prepared to keep it up for life for the weight to stay off.      A lot of people have a lot of luck with using something like a fit bit,  or a program where you keep track of all of your calories that you eat and what you burn off in the day.

## 2024-12-09 PROCEDURE — RXMED WILLOW AMBULATORY MEDICATION CHARGE

## 2024-12-11 ENCOUNTER — PHARMACY VISIT (OUTPATIENT)
Dept: PHARMACY | Facility: CLINIC | Age: 59
End: 2024-12-11
Payer: COMMERCIAL

## 2025-01-03 DIAGNOSIS — Z79.4 TYPE 2 DIABETES MELLITUS WITH HYPERGLYCEMIA, WITH LONG-TERM CURRENT USE OF INSULIN: ICD-10-CM

## 2025-01-03 DIAGNOSIS — E11.65 TYPE 2 DIABETES MELLITUS WITH HYPERGLYCEMIA, WITH LONG-TERM CURRENT USE OF INSULIN: ICD-10-CM

## 2025-01-03 RX ORDER — INSULIN LISPRO 100 [IU]/ML
INJECTION, SOLUTION INTRAVENOUS; SUBCUTANEOUS
Qty: 90 ML | Refills: 3 | Status: SHIPPED | OUTPATIENT
Start: 2025-01-03 | End: 2026-01-03

## 2025-01-06 PROCEDURE — RXMED WILLOW AMBULATORY MEDICATION CHARGE

## 2025-01-08 ENCOUNTER — PHARMACY VISIT (OUTPATIENT)
Dept: PHARMACY | Facility: CLINIC | Age: 60
End: 2025-01-08
Payer: COMMERCIAL

## 2025-01-15 DIAGNOSIS — E11.65 TYPE 2 DIABETES MELLITUS WITH HYPERGLYCEMIA, WITH LONG-TERM CURRENT USE OF INSULIN: ICD-10-CM

## 2025-01-15 DIAGNOSIS — Z79.4 TYPE 2 DIABETES MELLITUS WITH HYPERGLYCEMIA, WITH LONG-TERM CURRENT USE OF INSULIN: ICD-10-CM

## 2025-01-15 RX ORDER — INSULIN GLARGINE 100 [IU]/ML
INJECTION, SOLUTION SUBCUTANEOUS
Qty: 75 ML | Refills: 3 | Status: SHIPPED | OUTPATIENT
Start: 2025-01-15 | End: 2026-01-15

## 2025-01-22 ENCOUNTER — ANCILLARY PROCEDURE (OUTPATIENT)
Facility: HOSPITAL | Age: 60
End: 2025-01-22
Payer: COMMERCIAL

## 2025-01-22 DIAGNOSIS — M25.511 PAIN IN RIGHT SHOULDER: ICD-10-CM

## 2025-01-22 PROCEDURE — 73221 MRI JOINT UPR EXTREM W/O DYE: CPT | Mod: RIGHT SIDE | Performed by: STUDENT IN AN ORGANIZED HEALTH CARE EDUCATION/TRAINING PROGRAM

## 2025-01-22 PROCEDURE — 73221 MRI JOINT UPR EXTREM W/O DYE: CPT | Mod: RT

## 2025-02-07 PROCEDURE — RXMED WILLOW AMBULATORY MEDICATION CHARGE

## 2025-02-11 ENCOUNTER — PHARMACY VISIT (OUTPATIENT)
Dept: PHARMACY | Facility: CLINIC | Age: 60
End: 2025-02-11
Payer: COMMERCIAL

## 2025-02-11 NOTE — PROGRESS NOTES
HPI   Presents for follow up/metabolic management 57 yo with DM Type 2 diagnosed in her 40s. History Bipolar depression, HTN, HLD, Lymph edema (compression stocking/pump at night). Current A1c 7.5% was 7.5%.   -recent cellulitis treated with ab  -rotator cuff surgery planned for 3/7/2025    Patient testing sugars 6 times day with Erika 14 day sensor/reader. Lows 0-1 time/week.  Following carb controlled diet somewhat and knows reasonable carb allowances. Patient able to afford their medications. Patient is exercising/walking longer distance.         -taking lantus 84 units, humalog base 30 (breakfast/lunch) 35 dinner 40 units larger carb meals, ISF 10>130         -metformin intolerant, GLP1 intolerant, SGLT2 intolerant, TZD unable/edema, DPP4 consider           -taking lisinopril 10mg, bystolic 5mg 1/2 tab daily, lasix 40 mg prn for htn           -taking rosuvastatin 10mg for lipids and tolerating            30 days erika 14 data: 52% (was 70%) target, range 0% lows, pattern: low 200's overnight and waking, upper 100's after breakafast, upper 100's noon, low 200's after some dinners, upper 100's bedtime.          Current Outpatient Medications:     acetaminophen (Tylenol) 500 mg tablet, Take 1 tablet (500 mg) by mouth. EVERY 4 TO 6 HOURS AS NEEDED., Disp: , Rfl:     albuterol (Ventolin HFA) 90 mcg/actuation inhaler, Inhale 2 puffs every 4 hours if needed for wheezing or shortness of breath., Disp: 8 g, Rfl: 5    aspirin 81 mg EC tablet, Take 1 tablet (81 mg) by mouth once daily., Disp: , Rfl:     blood-glucose sensor (FreeStyle Erika 3 Sensor) device, 1 each every 14 (fourteen) days., Disp: 2 each, Rfl: 5    cholecalciferol (Vitamin D-3) 50 MCG (2000 UT) tablet, Vitamin D3 50 MCG (2000 UT) Oral Tablet  Refills: 0     Active, Disp: , Rfl:     EPINEPHrine 0.3 mg/0.3 mL injection syringe, Inject 0.3 mL (0.3 mg) into the muscle 1 time if needed for anaphylaxis for up to 6 doses. Use as directed for anaphylaxis reaction,  "Disp: 2 each, Rfl: 2    flash glucose sensor kit (FreeStyle Erika 14 Day Sensor) kit, use as directed CHANGE EVERY 14 DAYS, Disp: 2 each, Rfl: 5    fluticasone (Flonase) 50 mcg/actuation nasal spray, Administer 2 sprays into each nostril once daily., Disp: , Rfl:     FreeStyle Erika 14 Day Sensor kit, Use as instructed change every 14 days, Disp: 2 each, Rfl: 11    FreeStyle Erika 3 [place] misc, Use as instructed, Disp: 1 each, Rfl: 0    furosemide (Lasix) 40 mg tablet, Take 1 tablet (40 mg) by mouth once daily as needed (edema)., Disp: 90 tablet, Rfl: 3    hydrOXYzine HCL (Atarax) 25 mg tablet, Take 1 tablet (25 mg) by mouth. EVERY 4 TO 6 HOURS AS NEEDED FOR ANXIETY., Disp: , Rfl:     insulin glargine (Lantus Solostar U-100 Insulin) 100 unit/mL (3 mL) pen, INJECT UP TO 80 UNITS UNDER THE SKIN ONE TIME DAILY AS DIRECTED, Disp: 75 mL, Rfl: 3    insulin lispro (HumaLOG KwikPen Insulin) 100 unit/mL injection, INJECT SUBCUTANEOUSLY UP  UNITS ONE TIME DAILY, Disp: 90 mL, Rfl: 3    lamoTRIgine (LaMICtal) 150 mg tablet, Take 1 tablet (150 mg) by mouth 2 times a day., Disp: , Rfl:     lansoprazole (Prevacid) 30 mg DR capsule, Prevacid 30 MG Oral Capsule Delayed Release  Refills: 0     Active, Disp: , Rfl:     lisinopril 10 mg tablet, TAKE ONE TABLET BY MOUTH ONCE A  DAY, Disp: 90 tablet, Rfl: 3    loratadine (Claritin) 10 mg tablet, Take 1 tablet (10 mg) by mouth if needed for allergies., Disp: , Rfl:     multivit-min/iron/folic acid/K (ADULTS MULTIVITAMIN ORAL), Take by mouth. As directed, Disp: , Rfl:     nebivolol (Bystolic) 10 mg tablet, Take 0.5 tablets (5 mg) by mouth once daily., Disp: , Rfl:     pen needle, diabetic (BD Griselda 2nd Gen Pen Needle) 32 gauge x 5/32\" needle, Use 1 Pen needle 2 times a day., Disp: 200 each, Rfl: 3    pramipexole (Mirapex) 0.5 mg tablet, TAKE 1 TABLET BY MOUTH  IN THE MORNING  as needed and  2 before bedtime, Disp: 220 tablet, Rfl: 3    QUEtiapine (SEROquel) 200 mg tablet, Take 2 " tablets (400 mg) by mouth once daily at bedtime. (Patient taking differently: Take 2 tablets (400 mg) by mouth once daily at bedtime. Taking just one 200mg tablet at bedtime.), Disp: , Rfl:     rosuvastatin (Crestor) 10 mg tablet, Take 1 tablet (10 mg) by mouth once daily at bedtime., Disp: 90 tablet, Rfl: 1    sulfaSALAzine (Azulfidine) 500 mg tablet, Take 1 tablet (500 mg) by mouth 4 times a day., Disp: 240 tablet, Rfl: 5    tiZANidine (Zanaflex) 4 mg tablet, TAKE ONE TABLET BY MOUTH AT BEDTIME AS NEEDED FOR MUSCLE SPAMS, Disp: 90 tablet, Rfl: 3    Allergies as of 02/17/2025 - Reviewed 02/17/2025   Allergen Reaction Noted    Ozempic [semaglutide] Diarrhea 04/03/2023    Janumet [sitagliptin phos-metformin] Diarrhea 04/03/2023    Toujeo max u-300 solostar [insulin glargine u-300 conc] Diarrhea 04/03/2023    Dulaglutide Other 02/03/2023    Metformin Diarrhea 02/03/2023    Penicillins Other 02/03/2023    Pioglitazone hcl Hives and Other 09/18/2023    Venlafaxine Diarrhea 02/03/2023       /60 (BP Location: Right arm, Patient Position: Sitting)   Pulse 74   Wt 121 kg (265 lb 12.8 oz)   BMI 48.62 kg/m²     Labs:   Lab Results   Component Value Date    WBC 5.8 11/22/2024    NRBC 0.0 11/22/2024    RBC 4.61 11/22/2024    HGB 13.7 11/22/2024    HCT 43.6 11/22/2024     11/22/2024     Lab Results   Component Value Date    CALCIUM 9.0 11/22/2024    AST 18 11/22/2024    ALKPHOS 95 11/22/2024    BILITOT 0.5 11/22/2024    PROT 6.8 11/22/2024    ALBUMIN 3.8 11/22/2024     11/22/2024    K 4.2 11/22/2024     11/22/2024    CO2 29 11/22/2024    ANIONGAP 13 11/22/2024    BUN 20 11/22/2024    CREATININE 0.99 11/22/2024    GLUCOSE 131 (H) 11/22/2024    ALT 21 11/22/2024    EGFR 65 11/22/2024     Lab Results   Component Value Date    CHOL 169 11/22/2024    TRIG 123 11/22/2024    HDL 45.4 11/22/2024    LDLCALC 99 11/22/2024     Lab Results   Component Value Date    MICROALBCREA  11/22/2024      Comment:      One  "or more analytes used in this calculation is outside of the analytical measurement range. Calculation cannot be performed.     Lab Results   Component Value Date    TSH 1.49 11/22/2024     No results found for: \"IGYOSNDK64\"  Lab Results   Component Value Date    HGBA1C 7.5 (A) 02/17/2025         Assessment/Plan   1. Type 2 diabetes mellitus with hyperglycemia, with long-term current use of insulin  -A1c ordered and reviewed  -labs reviewed  -erika 14 data reviewed with patient (scanned in epic), reviewed glycemic targets, reinforced looking for patterns and trends    -overall doing well  -has been stress eating overnight, inject 5-10 units for carb snacks  -okay for surgery from a diabetes standpoint  -call [employer] about replacement Erika 3 sensors, sample provided today, pt has reader, reinforced setting low alert to 85    2. Benign essential hypertension  -above target today, no am BP meds-dizzyness/low BP this morning  -historically at target, pt following home BPs  -working with pcp to manage    3. Hypercholesteremia  -ldl 99, above target  -increase rosuvastatin to 20 mg daily        Follow up: Dr. Collins 6 months    -labs/tests/notes reviewed  -reviewed and counseled patient on medication monitoring and side effects    Treatment and plan discussed with Dr. Collins. Ruchi RN Certified Diabetes Care and     Medical Decision Making  Complexity of problem: Chronic illness of diabetes mellitus uncontrolled, progressing  Data analyzed and reviewed: Reviewed prior notes, blood glucose data, labs including HgbA1c, lipids, serum chemistries.  Ordered tests.   Risk of complications and morbidities: Is definite because of use of insulin and risk of hypoglycemia.  Prescription medications reviewed and modifications made.  Compliance assessed.  Addressed social determinants of health including food insecurity.    "

## 2025-02-12 ENCOUNTER — TELEPHONE (OUTPATIENT)
Dept: PRIMARY CARE | Facility: CLINIC | Age: 60
End: 2025-02-12
Payer: COMMERCIAL

## 2025-02-12 NOTE — TELEPHONE ENCOUNTER
I am scheduled for rotator cuff and bicep surgery and I need an H&P, EKG, CBC, BMP and an A1C.  They said you have nothing open.  I can get my A1C done with Dr.. Cameron on the 17th when I see him but could I come in there and get the EKG and other bloodwork?  They said I could come in and see a PA for the H&P but I don't know about that.  Let me know what to do.  
Lm on vm for patient to schedule an appointment with Juliann for her clearance.  
no

## 2025-02-17 ENCOUNTER — APPOINTMENT (OUTPATIENT)
Dept: ENDOCRINOLOGY | Facility: CLINIC | Age: 60
End: 2025-02-17
Payer: COMMERCIAL

## 2025-02-17 VITALS
DIASTOLIC BLOOD PRESSURE: 60 MMHG | SYSTOLIC BLOOD PRESSURE: 142 MMHG | BODY MASS INDEX: 48.62 KG/M2 | WEIGHT: 265.8 LBS | HEART RATE: 74 BPM

## 2025-02-17 DIAGNOSIS — I10 BENIGN ESSENTIAL HYPERTENSION: ICD-10-CM

## 2025-02-17 DIAGNOSIS — E78.00 HYPERCHOLESTEREMIA: ICD-10-CM

## 2025-02-17 DIAGNOSIS — Z79.4 TYPE 2 DIABETES MELLITUS WITH HYPERGLYCEMIA, WITH LONG-TERM CURRENT USE OF INSULIN: ICD-10-CM

## 2025-02-17 DIAGNOSIS — E11.65 TYPE 2 DIABETES MELLITUS WITH HYPERGLYCEMIA, WITH LONG-TERM CURRENT USE OF INSULIN: ICD-10-CM

## 2025-02-17 LAB — POC HEMOGLOBIN A1C: 7.5 % (ref 4.2–6.5)

## 2025-02-17 PROCEDURE — 95251 CONT GLUC MNTR ANALYSIS I&R: CPT | Performed by: INTERNAL MEDICINE

## 2025-02-17 PROCEDURE — 83036 HEMOGLOBIN GLYCOSYLATED A1C: CPT | Performed by: INTERNAL MEDICINE

## 2025-02-17 PROCEDURE — 99214 OFFICE O/P EST MOD 30 MIN: CPT | Performed by: INTERNAL MEDICINE

## 2025-02-17 RX ORDER — ROSUVASTATIN CALCIUM 20 MG/1
20 TABLET, COATED ORAL DAILY
Qty: 90 TABLET | Refills: 1 | Status: SHIPPED | OUTPATIENT
Start: 2025-02-17

## 2025-02-17 ASSESSMENT — PAIN SCALES - GENERAL: PAINLEVEL_OUTOF10: 3

## 2025-02-17 NOTE — PATIENT INSTRUCTIONS
Inject 5-10 units of humalog with carb snacks    Increase rosuvastatin to 20 mg daily    Call [facility] about failed kanu 3 sensors  Set low alert to 85

## 2025-02-17 NOTE — PROGRESS NOTES
Attestation signed by Clemente Collins MD on 2/17/25 at 12:22 PM.    I, Dr Clemente Collins, have reviewed this progress note, medication list, vital signs, any pertinent lab values, and any CGM data if present with the Certified Diabetes Care and  face to face during this visit today. This note reflects the treatment plan that was made under my direction after reviewing the above mentioned elements while face to face with the patient and CDE.  I personally answered and addressed any questions and concerns the patient had during the visit today.  The CDE entered the data in this note under my direction and I personally reviewed it, signed any lab or medication orders that I instructed to be completed. I am the billing provider for this visit and the level of service was determined by my involvement in the Medical Decision Making Component of this visit while face to face with the patient.

## 2025-02-26 ENCOUNTER — APPOINTMENT (OUTPATIENT)
Dept: PRIMARY CARE | Facility: CLINIC | Age: 60
End: 2025-02-26
Payer: COMMERCIAL

## 2025-02-26 VITALS
SYSTOLIC BLOOD PRESSURE: 138 MMHG | OXYGEN SATURATION: 97 % | HEART RATE: 72 BPM | DIASTOLIC BLOOD PRESSURE: 58 MMHG | BODY MASS INDEX: 49.02 KG/M2 | WEIGHT: 268 LBS

## 2025-02-26 DIAGNOSIS — G89.29 CHRONIC RIGHT SHOULDER PAIN: ICD-10-CM

## 2025-02-26 DIAGNOSIS — Z79.4 TYPE 2 DIABETES MELLITUS WITH HYPERGLYCEMIA, WITH LONG-TERM CURRENT USE OF INSULIN: ICD-10-CM

## 2025-02-26 DIAGNOSIS — M25.511 CHRONIC RIGHT SHOULDER PAIN: ICD-10-CM

## 2025-02-26 DIAGNOSIS — Z01.818 PRE-OP EXAM: Primary | ICD-10-CM

## 2025-02-26 DIAGNOSIS — L03.116 CELLULITIS OF LEFT LOWER EXTREMITY: ICD-10-CM

## 2025-02-26 DIAGNOSIS — I10 BENIGN ESSENTIAL HYPERTENSION: ICD-10-CM

## 2025-02-26 DIAGNOSIS — E11.65 TYPE 2 DIABETES MELLITUS WITH HYPERGLYCEMIA, WITH LONG-TERM CURRENT USE OF INSULIN: ICD-10-CM

## 2025-02-26 RX ORDER — MUPIROCIN 20 MG/G
OINTMENT TOPICAL 2 TIMES DAILY
Qty: 22 G | Refills: 0 | Status: SHIPPED | OUTPATIENT
Start: 2025-02-26 | End: 2025-03-08

## 2025-02-26 RX ORDER — CEPHALEXIN 500 MG/1
500 CAPSULE ORAL 4 TIMES DAILY
Qty: 40 CAPSULE | Refills: 0 | Status: SHIPPED | OUTPATIENT
Start: 2025-02-26 | End: 2025-03-08

## 2025-02-26 ASSESSMENT — PATIENT HEALTH QUESTIONNAIRE - PHQ9
SUM OF ALL RESPONSES TO PHQ9 QUESTIONS 1 AND 2: 0
1. LITTLE INTEREST OR PLEASURE IN DOING THINGS: NOT AT ALL
2. FEELING DOWN, DEPRESSED OR HOPELESS: NOT AT ALL

## 2025-02-26 NOTE — PATIENT INSTRUCTIONS
Labs today - we will call with results    Call Dr Collins to fax note    Start keflex   Can use mupirocin ointment too

## 2025-02-26 NOTE — PROGRESS NOTES
Subjective   Patient ID: Yessy Ortega is a 59 y.o. female who presents for Pre-op Exam (Rt shoulder surgery March 7 Dr Trejo).  HPI  Yessy presents for pre op exam for R shoulder rotator cuff repair, subacromial decompression, biceps tenotomy that she is having done on 3/7/25 with Dr. Phil Trejo.  She states she is feeling good, not sick now.  Bumped the L leg a week and a half ago, has been putting triple antibiotic cream on it.     Revised Cardiac Index:   How did you do previously with anesthesia? No issues, did fine   Can you walk up 2 flights of stairs without having chest pain? Yes  MI hx: NO  CVA hx: NO  CKD/Cr >2.0: NO  CHF: NO  DM using insulin: NO  High risk surgery: NO    Class I Risk: 0 Points - 3.9% 30-day risk of death, MI, or cardiac arrest     Surgical Risk Assessment:   Prior Anesthesia: She had prior anesthesia, no prior adverse reaction.   Exercise Capacity: able to walk two flights of stairs without symptoms.   Lifestyle Factors: denies alcohol use, denies tobacco use and denies illegal drug use.   Symptoms: no easy bleeding, some easy bruising, no chest pain, no cough, no dyspnea, no edema, no palpitations and no wheezing.     Patient Active Problem List   Diagnosis    Arthritis associated with inflammatory bowel disease    Arthritis of knee, right    Benign essential hypertension    Blister due to acute edema    Chronic cough    Dependent edema    Discogenic syndrome, lumbar    Dizziness    Esophageal reflux    External hemorrhoids    Fibrosis of uterus    Gout    History of 2019 novel coronavirus disease (COVID-19)    Left ureter dilated    Pain in knee    Sinus tachycardia    Sleep disorder    Ulcerative colitis    Ventral incisional hernia    Restless legs syndrome    Weakness of both lower extremities    Diabetes mellitus, type II, insulin dependent (Multi)    History of anaphylaxis    Hypercholesteremia    Screening for colon cancer    Cellulitis of right lower extremity     Bipolar disorder, in partial remission, most recent episode mixed (Multi)    Age-related osteoporosis without current pathological fracture    Type 2 diabetes mellitus with hyperglycemia (Multi)    Other obesity    Diabetic retinopathy of both eyes associated with type 2 diabetes mellitus    Moderate obstructive sleep apnea       Past Surgical History:   Procedure Laterality Date    ANKLE SURGERY      ANKLE SURGERY  07/12/2013    Ankle Surgery    COLONOSCOPY      COLONOSCOPY  07/12/2013    Complete Colonoscopy    COLONOSCOPY  07/12/2013    Complete Colonoscopy    IR INJECTION NERVE BLOCK      OTHER SURGICAL HISTORY  01/31/2016    Nerve Block    UMBILICAL HERNIA REPAIR      UMBILICAL HERNIA REPAIR  07/12/2013    Umbilical Hernia Repair      Past Medical History:   Diagnosis Date    Abscess of lower leg     Abscess of skin and subcutaneous tissue     Acute tonsillitis, unspecified 11/19/2020    Acute tonsillitis    Benign essential hypertension     Bronchitis     Cellulitis of unspecified part of limb 09/24/2015    Cellulitis of ankle    Contusion of left front wall of thorax     Contusion of left front wall of thorax, initial encounter 10/05/2018    Contusion, chest wall, left, initial encounter    Cutaneous abscess of limb, unspecified 11/14/2018    Abscess of calf    Cutaneous abscess, unspecified 02/20/2017    Abscess    H/O: depression     History of anaphylaxis     History of chest pain     Hypercholesteremia     Nephrolithiasis     Other conditions influencing health status     Nephrolithiasis    Personal history of (healed) traumatic fracture 05/26/2022    History of fracture of left ankle    Personal history of anaphylaxis 08/21/2017    History of anaphylaxis    Personal history of arthritis     Personal history of disease of skin and subcutaneous tissue     cellulitis    Personal history of disease of skin and subcutaneous tissue     vasculitis    Personal history of diseases of the skin and subcutaneous  tissue 11/03/2014    History of vasculitis of skin    Personal history of diseases of the skin and subcutaneous tissue 06/01/2018    History of cellulitis    Personal history of other diseases of the musculoskeletal system and connective tissue 01/02/2015    History of arthritis    Personal history of other diseases of the respiratory system 05/29/2014    History of acute bronchitis    Personal history of other mental and behavioral disorders     History of major depression    Personal history of other specified conditions 10/05/2018    History of chest pain    S/p tibial fracture     Tonsillitis     Type 2 diabetes mellitus with hyperglycemia, with long-term current use of insulin     Unspecified open wound, left lower leg, initial encounter 12/10/2018    Leg wound, left     Social History     Tobacco Use    Smoking status: Never     Passive exposure: Never    Smokeless tobacco: Never   Vaping Use    Vaping status: Never Used   Substance Use Topics    Alcohol use: Never     Comment: occasional    Drug use: Never        Review of Systems  10 point review of systems performed and is negative except as noted in the HPI.    Allergies   Allergen Reactions    Ozempic [Semaglutide] Diarrhea    Janumet [Sitagliptin Phos-Metformin] Diarrhea    Toujeo Max U-300 Solostar [Insulin Glargine U-300 Conc] Diarrhea    Dulaglutide Other    Metformin Diarrhea    Penicillins Other    Pioglitazone Hcl Hives and Other     dyspnea    Venlafaxine Diarrhea         Current Outpatient Medications:     acetaminophen (Tylenol) 500 mg tablet, Take 1 tablet (500 mg) by mouth. EVERY 4 TO 6 HOURS AS NEEDED., Disp: , Rfl:     albuterol (Ventolin HFA) 90 mcg/actuation inhaler, Inhale 2 puffs every 4 hours if needed for wheezing or shortness of breath., Disp: 8 g, Rfl: 5    aspirin 81 mg EC tablet, Take 1 tablet (81 mg) by mouth once daily., Disp: , Rfl:     blood-glucose sensor (FreeStyle Erika 3 Sensor) device, 1 each every 14 (fourteen) days.,  "Disp: 2 each, Rfl: 5    cholecalciferol (Vitamin D-3) 50 MCG (2000 UT) tablet, Vitamin D3 50 MCG (2000 UT) Oral Tablet  Refills: 0     Active, Disp: , Rfl:     EPINEPHrine 0.3 mg/0.3 mL injection syringe, Inject 0.3 mL (0.3 mg) into the muscle 1 time if needed for anaphylaxis for up to 6 doses. Use as directed for anaphylaxis reaction, Disp: 2 each, Rfl: 2    flash glucose sensor kit (FreeStyle Erika 14 Day Sensor) kit, use as directed CHANGE EVERY 14 DAYS, Disp: 2 each, Rfl: 5    fluticasone (Flonase) 50 mcg/actuation nasal spray, Administer 2 sprays into each nostril once daily., Disp: , Rfl:     FreeStyle Erika 14 Day Sensor kit, Use as instructed change every 14 days, Disp: 2 each, Rfl: 11    FreeStyle Erika 3 [place] misc, Use as instructed, Disp: 1 each, Rfl: 0    furosemide (Lasix) 40 mg tablet, Take 1 tablet (40 mg) by mouth once daily as needed (edema)., Disp: 90 tablet, Rfl: 3    hydrOXYzine HCL (Atarax) 25 mg tablet, Take 1 tablet (25 mg) by mouth. EVERY 4 TO 6 HOURS AS NEEDED FOR ANXIETY., Disp: , Rfl:     insulin glargine (Lantus Solostar U-100 Insulin) 100 unit/mL (3 mL) pen, INJECT UP TO 80 UNITS UNDER THE SKIN ONE TIME DAILY AS DIRECTED, Disp: 75 mL, Rfl: 3    insulin lispro (HumaLOG KwikPen Insulin) 100 unit/mL injection, INJECT SUBCUTANEOUSLY UP  UNITS ONE TIME DAILY, Disp: 90 mL, Rfl: 3    lamoTRIgine (LaMICtal) 150 mg tablet, Take 1 tablet (150 mg) by mouth 2 times a day., Disp: , Rfl:     lansoprazole (Prevacid) 30 mg DR capsule, Prevacid 30 MG Oral Capsule Delayed Release  Refills: 0     Active, Disp: , Rfl:     lisinopril 10 mg tablet, TAKE ONE TABLET BY MOUTH ONCE A  DAY, Disp: 90 tablet, Rfl: 3    multivit-min/iron/folic acid/K (ADULTS MULTIVITAMIN ORAL), Take by mouth. As directed, Disp: , Rfl:     nebivolol (Bystolic) 10 mg tablet, Take 0.5 tablets (5 mg) by mouth once daily., Disp: , Rfl:     pen needle, diabetic (BD Griselda 2nd Gen Pen Needle) 32 gauge x 5/32\" needle, Use 1 Pen needle " 2 times a day., Disp: 200 each, Rfl: 3    pramipexole (Mirapex) 0.5 mg tablet, TAKE 1 TABLET BY MOUTH  IN THE MORNING  as needed and  2 before bedtime, Disp: 220 tablet, Rfl: 3    QUEtiapine (SEROquel) 200 mg tablet, Take 2 tablets (400 mg) by mouth once daily at bedtime., Disp: , Rfl:     rosuvastatin (Crestor) 20 mg tablet, Take 1 tablet (20 mg) by mouth once daily., Disp: 90 tablet, Rfl: 1    sulfaSALAzine (Azulfidine) 500 mg tablet, Take 1 tablet (500 mg) by mouth 4 times a day., Disp: 240 tablet, Rfl: 5    tiZANidine (Zanaflex) 4 mg tablet, TAKE ONE TABLET BY MOUTH AT BEDTIME AS NEEDED FOR MUSCLE SPAMS, Disp: 90 tablet, Rfl: 3    cephalexin (Keflex) 500 mg capsule, Take 1 capsule (500 mg) by mouth 4 times a day for 10 days., Disp: 40 capsule, Rfl: 0    mupirocin (Bactroban) 2 % ointment, Apply topically 2 times a day for 10 days. apply to affected area, Disp: 22 g, Rfl: 0     Objective   /58   Pulse 72   Wt 122 kg (268 lb)   SpO2 97%   BMI 49.02 kg/m²     Physical Exam  Vitals reviewed.   Constitutional:       General: She is not in acute distress.     Appearance: Normal appearance. She is obese. She is not ill-appearing.   HENT:      Head: Normocephalic and atraumatic.      Right Ear: Tympanic membrane, ear canal and external ear normal.      Left Ear: Tympanic membrane, ear canal and external ear normal.      Nose: Nose normal.      Mouth/Throat:      Mouth: Mucous membranes are moist.      Pharynx: No posterior oropharyngeal erythema.   Eyes:      Extraocular Movements: Extraocular movements intact.      Conjunctiva/sclera: Conjunctivae normal.      Pupils: Pupils are equal, round, and reactive to light.   Cardiovascular:      Rate and Rhythm: Normal rate and regular rhythm.      Heart sounds: Normal heart sounds. No murmur heard.  Pulmonary:      Effort: Pulmonary effort is normal. No respiratory distress.      Breath sounds: Normal breath sounds. No wheezing.   Musculoskeletal:      Cervical  back: No rigidity.      Right lower leg: Edema present.      Left lower leg: Edema present.   Lymphadenopathy:      Cervical: No cervical adenopathy.   Skin:     General: Skin is warm and dry.             Comments: Small abrasion present on lateral side of L lower leg. There is some drainage that is white/yellow in color.    Neurological:      General: No focal deficit present.      Mental Status: She is alert. Mental status is at baseline.   Psychiatric:         Mood and Affect: Mood normal.         Assessment & Plan  Pre-op exam  Yessy is optimized for surgery   CBC - wnl   BMP - wnl   ECG - sinus rhythm, reviewed with Dr. Thornton   Revised cardiac risk score of: Class I Risk: 0 Points - 3.9% 30-day risk of death, MI, or cardiac arrest   Orders:    CBC and Auto Differential    Basic metabolic panel    ECG 12 Lead    Cellulitis of left lower extremity  She has lymphedema and will get mild infections due to poor circluation in her legs if she drops or bumps into something  For small abrasion will treat with keflex   Also given mupirocin to use if needed, has used in the past and it helped   Orders:    cephalexin (Keflex) 500 mg capsule; Take 1 capsule (500 mg) by mouth 4 times a day for 10 days.    mupirocin (Bactroban) 2 % ointment; Apply topically 2 times a day for 10 days. apply to affected area    Benign essential hypertension  Bystolic 10 mg   Lisinopril 10 mg        Chronic right shoulder pain         Type 2 diabetes mellitus with hyperglycemia, with long-term current use of insulin  She just saw endocrinology Dr. Collins 2/17/25 who did clear her for surgery, hemoglobin A1c is 7.5%             Discussed at visit any disease processes that were of concern as well as the risks, benefits and instructions on any new medication provided. Patient (and/or caretaker of patient if present) stated all questions were answered, and they voiced understanding of instructions.      Chely Vail PA-C

## 2025-02-27 LAB
ANION GAP SERPL CALCULATED.4IONS-SCNC: 8 MMOL/L (CALC) (ref 7–17)
BASOPHILS # BLD AUTO: 43 CELLS/UL (ref 0–200)
BASOPHILS NFR BLD AUTO: 0.7 %
BUN SERPL-MCNC: 24 MG/DL (ref 7–25)
BUN/CREAT SERPL: ABNORMAL (CALC) (ref 6–22)
CALCIUM SERPL-MCNC: 9.2 MG/DL (ref 8.6–10.4)
CHLORIDE SERPL-SCNC: 106 MMOL/L (ref 98–110)
CO2 SERPL-SCNC: 27 MMOL/L (ref 20–32)
CREAT SERPL-MCNC: 1.04 MG/DL (ref 0.5–1.05)
EGFRCR SERPLBLD CKD-EPI 2021: 62 ML/MIN/1.73M2
EOSINOPHIL # BLD AUTO: 189 CELLS/UL (ref 15–500)
EOSINOPHIL NFR BLD AUTO: 3.1 %
ERYTHROCYTE [DISTWIDTH] IN BLOOD BY AUTOMATED COUNT: 11.5 % (ref 11–15)
GLUCOSE SERPL-MCNC: 139 MG/DL (ref 65–99)
HCT VFR BLD AUTO: 40.1 % (ref 35–45)
HGB BLD-MCNC: 12.9 G/DL (ref 11.7–15.5)
LYMPHOCYTES # BLD AUTO: 1348 CELLS/UL (ref 850–3900)
LYMPHOCYTES NFR BLD AUTO: 22.1 %
MCH RBC QN AUTO: 29.2 PG (ref 27–33)
MCHC RBC AUTO-ENTMCNC: 32.2 G/DL (ref 32–36)
MCV RBC AUTO: 90.7 FL (ref 80–100)
MONOCYTES # BLD AUTO: 403 CELLS/UL (ref 200–950)
MONOCYTES NFR BLD AUTO: 6.6 %
NEUTROPHILS # BLD AUTO: 4118 CELLS/UL (ref 1500–7800)
NEUTROPHILS NFR BLD AUTO: 67.5 %
PLATELET # BLD AUTO: 227 THOUSAND/UL (ref 140–400)
PMV BLD REES-ECKER: 12.3 FL (ref 7.5–12.5)
POTASSIUM SERPL-SCNC: 4.7 MMOL/L (ref 3.5–5.3)
RBC # BLD AUTO: 4.42 MILLION/UL (ref 3.8–5.1)
SODIUM SERPL-SCNC: 141 MMOL/L (ref 135–146)
WBC # BLD AUTO: 6.1 THOUSAND/UL (ref 3.8–10.8)

## 2025-02-28 NOTE — ASSESSMENT & PLAN NOTE
She just saw endocrinology Dr. Collins 2/17/25 who did clear her for surgery, hemoglobin A1c is 7.5%

## 2025-03-03 PROCEDURE — RXMED WILLOW AMBULATORY MEDICATION CHARGE

## 2025-03-04 ENCOUNTER — PHARMACY VISIT (OUTPATIENT)
Dept: PHARMACY | Facility: CLINIC | Age: 60
End: 2025-03-04
Payer: COMMERCIAL

## 2025-04-05 PROCEDURE — RXMED WILLOW AMBULATORY MEDICATION CHARGE

## 2025-04-08 ENCOUNTER — PHARMACY VISIT (OUTPATIENT)
Dept: PHARMACY | Facility: CLINIC | Age: 60
End: 2025-04-08
Payer: COMMERCIAL

## 2025-05-09 PROCEDURE — RXMED WILLOW AMBULATORY MEDICATION CHARGE

## 2025-05-10 ENCOUNTER — PHARMACY VISIT (OUTPATIENT)
Dept: PHARMACY | Facility: CLINIC | Age: 60
End: 2025-05-10
Payer: COMMERCIAL

## 2025-05-23 ENCOUNTER — APPOINTMENT (OUTPATIENT)
Dept: PRIMARY CARE | Facility: CLINIC | Age: 60
End: 2025-05-23
Payer: COMMERCIAL

## 2025-05-23 VITALS
HEART RATE: 64 BPM | BODY MASS INDEX: 49.75 KG/M2 | DIASTOLIC BLOOD PRESSURE: 68 MMHG | OXYGEN SATURATION: 100 % | SYSTOLIC BLOOD PRESSURE: 136 MMHG | WEIGHT: 272 LBS

## 2025-05-23 DIAGNOSIS — R60.9 DEPENDENT EDEMA: ICD-10-CM

## 2025-05-23 DIAGNOSIS — G47.33 MODERATE OBSTRUCTIVE SLEEP APNEA: ICD-10-CM

## 2025-05-23 DIAGNOSIS — R60.9 BLISTER DUE TO ACUTE EDEMA: ICD-10-CM

## 2025-05-23 DIAGNOSIS — L03.116 CELLULITIS OF LEFT LOWER EXTREMITY: ICD-10-CM

## 2025-05-23 DIAGNOSIS — G25.81 RESTLESS LEGS SYNDROME: ICD-10-CM

## 2025-05-23 DIAGNOSIS — F31.9 BIPOLAR DEPRESSION (MULTI): ICD-10-CM

## 2025-05-23 DIAGNOSIS — E11.319 TYPE 2 DIABETES MELLITUS WITH RETINOPATHY WITHOUT MACULAR EDEMA, WITH LONG-TERM CURRENT USE OF INSULIN, UNSPECIFIED LATERALITY, UNSPECIFIED RETINOPATHY SEVERITY: ICD-10-CM

## 2025-05-23 DIAGNOSIS — E11.65 TYPE 2 DIABETES MELLITUS WITH HYPERGLYCEMIA, WITH LONG-TERM CURRENT USE OF INSULIN: Primary | ICD-10-CM

## 2025-05-23 DIAGNOSIS — T14.8XXA BLISTER DUE TO ACUTE EDEMA: ICD-10-CM

## 2025-05-23 DIAGNOSIS — Z79.4 TYPE 2 DIABETES MELLITUS WITH HYPERGLYCEMIA, WITH LONG-TERM CURRENT USE OF INSULIN: Primary | ICD-10-CM

## 2025-05-23 DIAGNOSIS — K51.819 OTHER ULCERATIVE COLITIS WITH COMPLICATION: ICD-10-CM

## 2025-05-23 DIAGNOSIS — Z79.4 TYPE 2 DIABETES MELLITUS WITH RETINOPATHY WITHOUT MACULAR EDEMA, WITH LONG-TERM CURRENT USE OF INSULIN, UNSPECIFIED LATERALITY, UNSPECIFIED RETINOPATHY SEVERITY: ICD-10-CM

## 2025-05-23 DIAGNOSIS — I10 BENIGN ESSENTIAL HYPERTENSION: ICD-10-CM

## 2025-05-23 PROBLEM — F31.77 BIPOLAR DISORDER, IN PARTIAL REMISSION, MOST RECENT EPISODE MIXED (MULTI): Chronic | Status: RESOLVED | Noted: 2023-04-03 | Resolved: 2025-05-23

## 2025-05-23 LAB — POC HEMOGLOBIN A1C: 7.2 % (ref 4.2–6.5)

## 2025-05-23 PROCEDURE — 4010F ACE/ARB THERAPY RXD/TAKEN: CPT | Performed by: FAMILY MEDICINE

## 2025-05-23 PROCEDURE — 3078F DIAST BP <80 MM HG: CPT | Performed by: FAMILY MEDICINE

## 2025-05-23 PROCEDURE — 3075F SYST BP GE 130 - 139MM HG: CPT | Performed by: FAMILY MEDICINE

## 2025-05-23 PROCEDURE — 3051F HG A1C>EQUAL 7.0%<8.0%: CPT | Performed by: FAMILY MEDICINE

## 2025-05-23 PROCEDURE — 83036 HEMOGLOBIN GLYCOSYLATED A1C: CPT | Performed by: FAMILY MEDICINE

## 2025-05-23 PROCEDURE — 99214 OFFICE O/P EST MOD 30 MIN: CPT | Performed by: FAMILY MEDICINE

## 2025-05-23 RX ORDER — DOXYCYCLINE HYCLATE 100 MG
1 TABLET ORAL
COMMUNITY
Start: 2025-05-15

## 2025-05-23 ASSESSMENT — PATIENT HEALTH QUESTIONNAIRE - PHQ9
2. FEELING DOWN, DEPRESSED OR HOPELESS: NOT AT ALL
1. LITTLE INTEREST OR PLEASURE IN DOING THINGS: NOT AT ALL
SUM OF ALL RESPONSES TO PHQ9 QUESTIONS 1 AND 2: 0

## 2025-05-23 NOTE — ASSESSMENT & PLAN NOTE
Stable today - low at times -   Discussed cutting back Nebivolol more if needed      H/O lithotripsy    S/P vaginopexy

## 2025-05-23 NOTE — PATIENT INSTRUCTIONS
Keep up the dressing changes -   Try the compression sleeve too     If not getting better -  appt at wound clinic   [phone]  or DR Vásquez again       You may want to take the furosemide more often   To help with the swelling       If your BP is too low - you can hold the Nebivolol , or take 1/4 daily - or take every other day       Please call [phone] to set up colonoscopy       [facility] 's   (Breast Cancer and Cervical Cancer Prevention )  phone number  is [phone].     This is a program who will cover the costs of breast and pelvic exams, pap smears, and mammograms, as well as the follow up to any abnormalities found with those tests.      They  help women who are uninsured or under insured.     If you haven't yet,  please give them a call to see if you qualify for the program.    If you do,  they will have paperwork for you to fill out and send back.     If you have qualified for the program,  they will have to set up all appointments for you.     If you are Christian, the [facility] ( an [facility]) sponsors periodic clinics with [facility].  You can ask the [facility] people if you could have an appointment there  OR  in the office with me.       If you are non-Christian, your visit will only be here in the office.      If you already have your order for a mammogram,  be sure to contact them to set up the mammogram appointment.     Be sure to take your mammogram order with you to your appointment, along with any [facility] paperwork you may have.     If you have already had your testing, you will always hear about your results.  So if 3 weeks go by, and you have not heard anything, please let either myself or the people at [facility] know.     And,  please tell any of your friends who may not have insurance about this fantastic program!         For General Healthy Nutrition    (Remember - NOT A DIET!   Diets are only good for class reunions.)    These are my general good nutrition recommendations  "for most people.   I use the term \" diet \"  in these instructions to mean your overall nutrition - how you eat and drink.   If we talked about something different during your visit with me,  other than what is written below,  follow that advice instead.       For most people,  eating healthier means getting less added sugar and less processed foods in your diet    The fresher the better.    Added sugar is now a part of the nutrition label on manufactured food, so you can keep an eye on it easier.    But basically,  foods and beverages  that contain regular sugar and corn syrup are the main sources of added sugars.  Eating as little of these foods as you can is best.   One shocking example of the epidemic of added sugar is soda.    One can of regular soda contains about as much added sugar as 3 regular size doughnuts!     The other issue with processed foods is the amount of processed grains they contain , such as white flour.    This is also something you want to try to limit in your diet.     But, grain products are very important for your nutrition.    Whole grains are better for your body.     Cutting back on white breads, traditional pasta, baked goods, white rice,  and processed cereals will be healthier for you.   The better choices include whole grain breads,  whole wheat pasta,  brown rice, quinoa, barley, steel cut  or rolled oats.   If you eat cereal for breakfast, try to look for one made with whole grains and less sugar.   There are many people who have a problem with gluten, for a large variety of reasons.    Generally,  products made with wheat flour , barley or rye are the primary source of gluten.       Cutting back on saturated fats is important.    You want the majority of the meat that you eat to be chicken, fish or turkey.   Baked or broiled is best -  fried adds too much fat.    There are healthy fats that are important - fat is important for holding down appetite, vitamin absorption and several " "metabolic processes in the body.  Monounsaturated fats raise HDL (good cholesterol) and lower LDL (bad cholesterol).   Olive oil, peanut oil, nuts, seeds, and avocados are great sources of the good fats.       Ideas are:   Trade sour cream dip for hummus (which is rich in olive oil) or guacamole; use veggies or whole-wheat chips to dip.    Nuts are an excellent source of protein and healthy fats.   Tree nuts are the best kind, such as almonds or walnuts.   Just be careful - they are high in calories, so stick to a serving size.  (Most are about 200 calories for a 1/4 cup)      Proteins are very important for your body, and they also hold down your appetite.   Try to have protein with every meal.    These generally are meats, nuts, many beans, legumes, eggs, and dairy.   You will find protein in whole grain products and some green vegetables have a little too.     When you have dairy (if you can - many people are lactose intolerant) try to make it low fat.    Ideas are 1% milk, lowfat yogurt or cheeses, low fat cottage cheese.   I don't generally recommend FAT FREE because they often contain artificial products to improve taste, and the fat helps hold down your appetite.   If you are lactose intolerant, try to see if taking Lactaid before having dairy helps.      Fresh fruits and vegetables are VERY important.  The brighter the better.   Many vegetables are considered \"Free Foods\" - meaning you can eat as much as you want, and it does not matter.  These include tomatoes, cucumbers, celery, peppers, all the various lettuces and kale - to name a few.   Potatoes, corn and peas are starchy, so do have more calories, but are still healthy - you just want to watch the amount of them you eat.       Fruits are full of wonderful nutrition.   They contain natural sugar called fructose, so eating them in moderation is best.   Diabetics may need to pay careful attention to how their body reacts to the sugar.  Some fruits might " drastically increase their blood sugar.      Eating smaller meals with a couple of small snacks is better for your metabolism than not eating for long amounts of time  (breakfast is very important).   Trying to avoid large meals is helpful too.    Eating like this helps keep your appetite down and keeps you in burning metabolism rather than storage metabolism so your body will use the calories you eat.       I do not tell people to stop eating sweets or snack foods - just limit the amounts you have.  The less the better.   Pay attention to serving sizes, and treat them as a treat.        Foods like doughnuts, pop tarts, sugar cereals, cookies  ARE NOT GOOD FOR BREAKFAST.   They are loaded with sugar and will cause you to be hungrier in the day and often not feel well.    Caffeine needs to be limited - no more than 2 servings a day.  Some people can't have any at all.    (if you have any sleep or anxiety issues - stop the caffeine)   Coffee, many teas, many sodas, energy drinks, almost any diet supplement,  and chocolate all contain caffeine.      Water is important.   For most people, 8   x  8 ounces  a day are needed.  This may vary for some health issues.    If you need to be on a low sodium diet, that means looking at labels and eating only 1000 - 2000 mg of sodium a day.    Calcium intake is important.  3 servings of a high calcium food or drink a day is recommended.   This is usually a cup of milk, a cup of yogurt, an ounce of a hard cheese or 1.5 ounces of a soft cheese are the usual servings.   There are other high calcium foods - including soy or almond milk, broccoli,  almonds, dark green leafy vegetable.   Make sure you are not getting more than 1000  - 1200 mg of total calcium a day (unless you have been told you need more by a doctor).    Vitamin D 3 is important to absorb the calcium and for your immune system.   For children, 400 IU a day is recommended.   For adults - 800 - 5000 IU a day  is  recommended.  (Often the amount needed is individualized for adults - be sure to ask how much is right for you)    Physical activity is very important for good health.    Finding activities that give you regular exercise is very important for good health.  Try to find exercise you enjoy doing on a regular basis.    30 minutes at least 5 days a week of a good cardiovascular exercise is recommended.   That means something that gets your heart rate going faster than your usual baseline and you can find yourself breathing harder than usual while you are exercising.  If you have not done any exercise in a long time,  make sure you ask if its safe for you to start,  and be sure to gradually work up to your goal.      If you need to lose weight,  following these recommendations will help you.   And if you are doing all of this and still not losing weight, then its likely just the amount of food you are eating.   Learn to cut back on portion sizes.  Using smaller plates may help.  Healthy weight loss is  only about a pound a week.   You have to remember that whatever you do to lose the weight, you must be prepared to keep it up for life for the weight to stay off.     A lot of people have a lot of luck with using something like a fit bit,  or a program where you keep track of all of your calories that you eat and what you burn off in the day.

## 2025-05-23 NOTE — PROGRESS NOTES
Subjective   Patient ID: Yessy Ortega is a 59 y.o. female who presents for Follow-up (She also wants her left leg checked. She has lymphedema and has an open sore on her leg.  She started antibiotics last Wednesday.).    [facility]     LOV    11/2024    Updates and concerns:       She has been seeing endocrine for diabetes ;    Psychiatry for bipolar disorder       11 weeks ago -   Arthroscopic surgery right shoulder - DR Trejo   In therapy -  doing well       Swelling of legs has been terrible    Has not compression sleeve from the lymphedema store  - really helping     But started with a sore on the left leg -   3 - 4 weeks ago   Called [facility]  -   RX Doxycycline     BP very low at times -   90s systolic  in   March -   Had to cut Bystolic back to every other day   But now back daily again         Chronic issues reviewed today :      Diabetes T2 -  seeing DR Collins  - appt AUG            Last A1C - 7.5             Today  -   7.2%       Meds -   ON Lantus - 80 - 84  units AM   Humalog - per scale 20-30 units up to 50 units - may need correction at time   Farxiga started too - OFF FARXIGA - COST   Meter is Freestyle Erika - LOVES IT     Eye doctor - Sept 2023  - getting  - has appt     Feet - doing well     Stopped working with diabetes educator      ON ASA 81 mg a day      HTN -   Bystolic 5 mg a day  Lisinopril 10 mg every day      Hyperchol - Rosuvastatin 10 mg - tolerating well      (simvastatin 20 mg CAUSED ACHES)      Cor Calcium score zero 7/2019         SHE DID HAVE COVID 19 IN MAY 2020 - CT of chest May 13/2021- less than 10 % lung involvement now  and several other findings -   7 mm non -obstructing kidney stone;   Calcified nodule on thyroid     Gets occas LIN - did not try albuterol     JULIO C  -        Date of HSAT -   4/20/24    AHI3%  22.4 per hour -   23 apneas and 124 hypopneas with 3% desat   AHI 4%   11.7 events per hour  54 hypopneas with 4% desat   Was to be set up with Autopap 5 - 20 mm  H2O   And heated humidification  -   And nasal pillows mask and equipment   Did start on Autopap about June 2024  Nasal pillows mask -   Really loves it   Wears at at least 4 hours   [employer]  - likes them       Chronic Edema -   Saw Dr Mohseni - Dx Lymphedema -     ON furosemide 40 every other day  -       compression devices every day      Bipolar d/o Dx  2022  psych with [place], counselor monthly   Now on Lamictal and Seroquel    Hydroxyzine prn     Seeing counselor every 2 months          RLS - pramipexole 0.5 mg -   Usually taking 2 hs now , but needs it in the daytime too at times        Arthritis of IBD (UC) - saw Lumapas in the past - on sulfasalazine - 2 pills BID  -  OFF NOW   She was told can take 2 QID if inflammation high   Having a lot of joint pain lately   was on prednisone - could not take it     Tizanadine for back pain - 4 mg hs usually       Colonoscopy -   - saw Bill in the past   Has not seen GI  in years   - last one in chart 2008   Still did not go  - has order   Still was not able to get colonoscopy  - not rescheduled      GERD - prevacid daily      Taking Vit D 3 - 5000 IU a day  -      NOT TAKING       WAC -      WWE -   9/2020 with [facility] - pap and mamm fine      At June appt 2022  - saw GARRY FOR Postmenopausal Uterine BLEEDING  -   Had Bx done - told it was fine -   No bleeding since         Flu shot -  UTD   Pneumovax - had not done yet - declines for now  shingrix - declines for now   COVID - not yet - declines it            Review of Systems    Objective   /68 (BP Location: Left arm, Patient Position: Sitting, BP Cuff Size: Large adult)   Pulse 64   Wt 123 kg (272 lb)   SpO2 100%   BMI 49.75 kg/m²     Physical Exam  Vitals reviewed.   Constitutional:       General: She is not in acute distress.     Appearance: Normal appearance. She is obese.   HENT:      Head: Normocephalic and atraumatic.      Nose: Nose normal.      Mouth/Throat:      Mouth: Mucous  membranes are moist.      Pharynx: No posterior oropharyngeal erythema.   Eyes:      Extraocular Movements: Extraocular movements intact.      Conjunctiva/sclera: Conjunctivae normal.      Pupils: Pupils are equal, round, and reactive to light.   Cardiovascular:      Rate and Rhythm: Normal rate and regular rhythm.      Heart sounds: Normal heart sounds. No murmur heard.  Pulmonary:      Effort: Pulmonary effort is normal. No respiratory distress.      Breath sounds: Normal breath sounds. No wheezing.   Musculoskeletal:         General: Swelling present.      Cervical back: No rigidity.   Lymphadenopathy:      Cervical: No cervical adenopathy.   Skin:     General: Skin is warm and dry.      Findings: Lesion present. No rash.      Comments: Left shin with a 5 - 6 inch patch of skin that is erythematous and superficial tissue is bubbling up in several areas    Neurological:      General: No focal deficit present.      Mental Status: She is alert. Mental status is at baseline.   Psychiatric:         Mood and Affect: Mood normal.         Thought Content: Thought content normal.         Assessment & Plan  Type 2 diabetes mellitus with hyperglycemia, with long-term current use of insulin    Orders:    POCT glycosylated hemoglobin (Hb A1C) manually resulted    Other ulcerative colitis with complication  Stable at this time off meds         Dependent edema    Education on taking furosemide more often to help with the swelling to help with wound healing   Blister due to acute edema  Needs to get edema down to help more  -   As above     Moderate obstructive sleep apnea    Compliant with autopap   Benign essential hypertension  Stable today - low at times -   Discussed cutting back Nebivolol more if needed     Cellulitis of left lower extremity    Improving   Restless legs syndrome    No change in meds    Bipolar depression (Multi)    Sees psychiatry          Medically complicated     Working with  psychiatry and endocrine    And to see ortho     Doing great with Autopap       Urged getting her colonoscopy and WWW with [facility]       We discussed at visit any disease processes that were of concern as well as the risks, benefits and instructions of any new medication provided.    See orders and discussion section for information handed to patient on their Clinical Summary.   Patient (and/or caretaker of patient if present)  stated all questions were answered, and they voiced understanding of instructions.

## 2025-06-02 ENCOUNTER — TELEPHONE (OUTPATIENT)
Dept: PRIMARY CARE | Facility: CLINIC | Age: 60
End: 2025-06-02
Payer: COMMERCIAL

## 2025-06-02 DIAGNOSIS — L03.116 CELLULITIS OF LEFT LOWER EXTREMITY: Primary | ICD-10-CM

## 2025-06-02 PROCEDURE — RXMED WILLOW AMBULATORY MEDICATION CHARGE

## 2025-06-02 RX ORDER — DOXYCYCLINE 100 MG/1
100 CAPSULE ORAL 2 TIMES DAILY
Qty: 20 CAPSULE | Refills: 0 | Status: SHIPPED | OUTPATIENT
Start: 2025-06-02 | End: 2025-06-12

## 2025-06-04 ENCOUNTER — PHARMACY VISIT (OUTPATIENT)
Dept: PHARMACY | Facility: CLINIC | Age: 60
End: 2025-06-04
Payer: COMMERCIAL

## 2025-06-10 PROCEDURE — RXMED WILLOW AMBULATORY MEDICATION CHARGE

## 2025-06-11 ENCOUNTER — PHARMACY VISIT (OUTPATIENT)
Dept: PHARMACY | Facility: CLINIC | Age: 60
End: 2025-06-11
Payer: COMMERCIAL

## 2025-07-08 PROCEDURE — RXMED WILLOW AMBULATORY MEDICATION CHARGE

## 2025-07-09 ENCOUNTER — OFFICE VISIT (OUTPATIENT)
Dept: WOUND CARE | Facility: HOSPITAL | Age: 60
End: 2025-07-09
Payer: COMMERCIAL

## 2025-07-09 ENCOUNTER — PHARMACY VISIT (OUTPATIENT)
Dept: PHARMACY | Facility: CLINIC | Age: 60
End: 2025-07-09
Payer: COMMERCIAL

## 2025-07-09 PROCEDURE — 11042 DBRDMT SUBQ TIS 1ST 20SQCM/<: CPT | Mod: LT

## 2025-07-09 PROCEDURE — 99213 OFFICE O/P EST LOW 20 MIN: CPT | Mod: 25

## 2025-07-14 ENCOUNTER — OFFICE VISIT (OUTPATIENT)
Dept: WOUND CARE | Facility: HOSPITAL | Age: 60
End: 2025-07-14
Payer: COMMERCIAL

## 2025-07-14 DIAGNOSIS — I89.0 LYMPHEDEMA: Primary | ICD-10-CM

## 2025-07-14 PROCEDURE — 4010F ACE/ARB THERAPY RXD/TAKEN: CPT | Performed by: SURGERY

## 2025-07-14 PROCEDURE — 3051F HG A1C>EQUAL 7.0%<8.0%: CPT | Performed by: SURGERY

## 2025-07-14 PROCEDURE — 99213 OFFICE O/P EST LOW 20 MIN: CPT | Performed by: SURGERY

## 2025-07-14 PROCEDURE — 99213 OFFICE O/P EST LOW 20 MIN: CPT

## 2025-07-21 ENCOUNTER — OFFICE VISIT (OUTPATIENT)
Dept: WOUND CARE | Facility: HOSPITAL | Age: 60
End: 2025-07-21
Payer: COMMERCIAL

## 2025-07-21 ENCOUNTER — HOSPITAL ENCOUNTER (OUTPATIENT)
Dept: VASCULAR MEDICINE | Facility: HOSPITAL | Age: 60
Discharge: HOME | End: 2025-07-21
Payer: COMMERCIAL

## 2025-07-21 DIAGNOSIS — I89.0 LYMPHEDEMA: ICD-10-CM

## 2025-07-21 DIAGNOSIS — I89.1 LYMPHANGITIS: ICD-10-CM

## 2025-07-21 PROCEDURE — 93923 UPR/LXTR ART STDY 3+ LVLS: CPT

## 2025-07-21 PROCEDURE — 93923 UPR/LXTR ART STDY 3+ LVLS: CPT | Performed by: STUDENT IN AN ORGANIZED HEALTH CARE EDUCATION/TRAINING PROGRAM

## 2025-07-21 PROCEDURE — 99213 OFFICE O/P EST LOW 20 MIN: CPT | Performed by: SURGERY

## 2025-07-21 PROCEDURE — 99213 OFFICE O/P EST LOW 20 MIN: CPT | Mod: 25

## 2025-07-24 ENCOUNTER — APPOINTMENT (OUTPATIENT)
Dept: SURGERY | Facility: CLINIC | Age: 60
End: 2025-07-24
Payer: COMMERCIAL

## 2025-07-28 ENCOUNTER — OFFICE VISIT (OUTPATIENT)
Dept: WOUND CARE | Facility: HOSPITAL | Age: 60
End: 2025-07-28
Payer: COMMERCIAL

## 2025-07-28 PROCEDURE — 29581 APPL MULTLAYER CMPRN SYS LEG: CPT | Performed by: SURGERY

## 2025-07-28 PROCEDURE — 29581 APPL MULTLAYER CMPRN SYS LEG: CPT

## 2025-08-01 ENCOUNTER — APPOINTMENT (OUTPATIENT)
Dept: RADIOLOGY | Facility: HOSPITAL | Age: 60
DRG: 607 | End: 2025-08-01
Payer: COMMERCIAL

## 2025-08-01 ENCOUNTER — HOSPITAL ENCOUNTER (INPATIENT)
Facility: HOSPITAL | Age: 60
End: 2025-08-01
Attending: EMERGENCY MEDICINE | Admitting: INTERNAL MEDICINE
Payer: COMMERCIAL

## 2025-08-01 ENCOUNTER — APPOINTMENT (OUTPATIENT)
Dept: WOUND CARE | Facility: HOSPITAL | Age: 60
End: 2025-08-01
Payer: COMMERCIAL

## 2025-08-01 DIAGNOSIS — L03.116 CELLULITIS OF LEFT LOWER EXTREMITY: Primary | ICD-10-CM

## 2025-08-01 DIAGNOSIS — S81.802D LEG WOUND, LEFT, SUBSEQUENT ENCOUNTER: Primary | ICD-10-CM

## 2025-08-01 DIAGNOSIS — R52 INTRACTABLE PAIN: ICD-10-CM

## 2025-08-01 DIAGNOSIS — L03.115 CELLULITIS OF RIGHT LOWER EXTREMITY: ICD-10-CM

## 2025-08-01 PROBLEM — S82.839A CLOSED FRACTURE OF DISTAL END OF FIBULA: Status: ACTIVE | Noted: 2023-02-03

## 2025-08-01 PROBLEM — I89.0 LYMPHEDEMA: Status: ACTIVE | Noted: 2025-08-01

## 2025-08-01 PROBLEM — S81.802A LEG WOUND, LEFT, INITIAL ENCOUNTER: Status: ACTIVE | Noted: 2025-08-01

## 2025-08-01 PROBLEM — M25.569 KNEE PAIN: Status: ACTIVE | Noted: 2023-02-03

## 2025-08-01 PROBLEM — N28.82 MEGAURETER: Status: RESOLVED | Noted: 2025-08-01 | Resolved: 2025-08-01

## 2025-08-01 PROBLEM — M25.579 ANKLE PAIN: Status: ACTIVE | Noted: 2023-02-03

## 2025-08-01 PROBLEM — G82.20 PARAPARESIS (MULTI): Status: ACTIVE | Noted: 2023-02-03

## 2025-08-01 PROBLEM — M54.16 LUMBAR RADICULOPATHY: Status: ACTIVE | Noted: 2023-02-03

## 2025-08-01 PROBLEM — S89.90XA INJURY OF LOWER EXTREMITY: Status: ACTIVE | Noted: 2025-08-01

## 2025-08-01 PROBLEM — E66.9 OBESITY: Status: ACTIVE | Noted: 2023-09-18

## 2025-08-01 PROBLEM — R05.9 COUGH: Status: ACTIVE | Noted: 2023-02-03

## 2025-08-01 PROBLEM — U09.9 POST-ACUTE COVID-19 SYNDROME: Status: RESOLVED | Noted: 2025-08-01 | Resolved: 2025-08-01

## 2025-08-01 PROBLEM — M19.90 ARTHRITIS: Status: ACTIVE | Noted: 2025-08-01

## 2025-08-01 PROBLEM — Z86.59 HISTORY OF DEPRESSION: Status: ACTIVE | Noted: 2025-08-01

## 2025-08-01 LAB
ANION GAP SERPL CALC-SCNC: 11 MMOL/L (ref 10–20)
BASOPHILS # BLD AUTO: 0.04 X10*3/UL (ref 0–0.1)
BASOPHILS NFR BLD AUTO: 0.6 %
BUN SERPL-MCNC: 27 MG/DL (ref 6–23)
CALCIUM SERPL-MCNC: 9 MG/DL (ref 8.6–10.3)
CHLORIDE SERPL-SCNC: 106 MMOL/L (ref 98–107)
CO2 SERPL-SCNC: 26 MMOL/L (ref 21–32)
CREAT SERPL-MCNC: 1.15 MG/DL (ref 0.5–1.05)
CRP SERPL-MCNC: 0.98 MG/DL
EGFRCR SERPLBLD CKD-EPI 2021: 55 ML/MIN/1.73M*2
EOSINOPHIL # BLD AUTO: 0.25 X10*3/UL (ref 0–0.7)
EOSINOPHIL NFR BLD AUTO: 3.7 %
ERYTHROCYTE [DISTWIDTH] IN BLOOD BY AUTOMATED COUNT: 12.2 % (ref 11.5–14.5)
ERYTHROCYTE [SEDIMENTATION RATE] IN BLOOD BY WESTERGREN METHOD: 26 MM/H (ref 0–30)
GLUCOSE BLD MANUAL STRIP-MCNC: 130 MG/DL (ref 74–99)
GLUCOSE SERPL-MCNC: 116 MG/DL (ref 74–99)
HCT VFR BLD AUTO: 39.9 % (ref 36–46)
HGB BLD-MCNC: 13 G/DL (ref 12–16)
IMM GRANULOCYTES # BLD AUTO: 0.05 X10*3/UL (ref 0–0.7)
IMM GRANULOCYTES NFR BLD AUTO: 0.7 % (ref 0–0.9)
LYMPHOCYTES # BLD AUTO: 1.23 X10*3/UL (ref 1.2–4.8)
LYMPHOCYTES NFR BLD AUTO: 18.2 %
MCH RBC QN AUTO: 28.6 PG (ref 26–34)
MCHC RBC AUTO-ENTMCNC: 32.6 G/DL (ref 32–36)
MCV RBC AUTO: 88 FL (ref 80–100)
MONOCYTES # BLD AUTO: 0.6 X10*3/UL (ref 0.1–1)
MONOCYTES NFR BLD AUTO: 8.9 %
NEUTROPHILS # BLD AUTO: 4.57 X10*3/UL (ref 1.2–7.7)
NEUTROPHILS NFR BLD AUTO: 67.9 %
NRBC BLD-RTO: 0 /100 WBCS (ref 0–0)
PLATELET # BLD AUTO: 227 X10*3/UL (ref 150–450)
POTASSIUM SERPL-SCNC: 4 MMOL/L (ref 3.5–5.3)
RBC # BLD AUTO: 4.54 X10*6/UL (ref 4–5.2)
SODIUM SERPL-SCNC: 139 MMOL/L (ref 136–145)
WBC # BLD AUTO: 6.7 X10*3/UL (ref 4.4–11.3)

## 2025-08-01 PROCEDURE — 86140 C-REACTIVE PROTEIN: CPT

## 2025-08-01 PROCEDURE — 96372 THER/PROPH/DIAG INJ SC/IM: CPT

## 2025-08-01 PROCEDURE — 73700 CT LOWER EXTREMITY W/O DYE: CPT | Mod: LEFT SIDE | Performed by: RADIOLOGY

## 2025-08-01 PROCEDURE — 2500000002 HC RX 250 W HCPCS SELF ADMINISTERED DRUGS (ALT 637 FOR MEDICARE OP, ALT 636 FOR OP/ED)

## 2025-08-01 PROCEDURE — 96375 TX/PRO/DX INJ NEW DRUG ADDON: CPT

## 2025-08-01 PROCEDURE — 96376 TX/PRO/DX INJ SAME DRUG ADON: CPT

## 2025-08-01 PROCEDURE — 2500000004 HC RX 250 GENERAL PHARMACY W/ HCPCS (ALT 636 FOR OP/ED): Mod: JZ

## 2025-08-01 PROCEDURE — G0378 HOSPITAL OBSERVATION PER HR: HCPCS

## 2025-08-01 PROCEDURE — 99223 1ST HOSP IP/OBS HIGH 75: CPT

## 2025-08-01 PROCEDURE — 2500000004 HC RX 250 GENERAL PHARMACY W/ HCPCS (ALT 636 FOR OP/ED)

## 2025-08-01 PROCEDURE — 82947 ASSAY GLUCOSE BLOOD QUANT: CPT

## 2025-08-01 PROCEDURE — 73590 X-RAY EXAM OF LOWER LEG: CPT | Mod: LT

## 2025-08-01 PROCEDURE — 99285 EMERGENCY DEPT VISIT HI MDM: CPT | Performed by: EMERGENCY MEDICINE

## 2025-08-01 PROCEDURE — 96374 THER/PROPH/DIAG INJ IV PUSH: CPT | Mod: 59

## 2025-08-01 PROCEDURE — 80048 BASIC METABOLIC PNL TOTAL CA: CPT | Performed by: EMERGENCY MEDICINE

## 2025-08-01 PROCEDURE — 85652 RBC SED RATE AUTOMATED: CPT

## 2025-08-01 PROCEDURE — 2500000004 HC RX 250 GENERAL PHARMACY W/ HCPCS (ALT 636 FOR OP/ED): Mod: JZ | Performed by: EMERGENCY MEDICINE

## 2025-08-01 PROCEDURE — 73590 X-RAY EXAM OF LOWER LEG: CPT | Mod: LEFT SIDE | Performed by: STUDENT IN AN ORGANIZED HEALTH CARE EDUCATION/TRAINING PROGRAM

## 2025-08-01 PROCEDURE — 2500000001 HC RX 250 WO HCPCS SELF ADMINISTERED DRUGS (ALT 637 FOR MEDICARE OP): Performed by: EMERGENCY MEDICINE

## 2025-08-01 PROCEDURE — 73700 CT LOWER EXTREMITY W/O DYE: CPT | Mod: LT

## 2025-08-01 PROCEDURE — 85025 COMPLETE CBC W/AUTO DIFF WBC: CPT | Performed by: EMERGENCY MEDICINE

## 2025-08-01 PROCEDURE — 87205 SMEAR GRAM STAIN: CPT | Mod: GEALAB | Performed by: EMERGENCY MEDICINE

## 2025-08-01 PROCEDURE — 99223 1ST HOSP IP/OBS HIGH 75: CPT | Performed by: NURSE PRACTITIONER

## 2025-08-01 PROCEDURE — 2500000001 HC RX 250 WO HCPCS SELF ADMINISTERED DRUGS (ALT 637 FOR MEDICARE OP)

## 2025-08-01 PROCEDURE — 36415 COLL VENOUS BLD VENIPUNCTURE: CPT | Performed by: EMERGENCY MEDICINE

## 2025-08-01 PROCEDURE — 87070 CULTURE OTHR SPECIMN AEROBIC: CPT | Mod: GEALAB | Performed by: EMERGENCY MEDICINE

## 2025-08-01 RX ORDER — ALBUTEROL SULFATE 0.83 MG/ML
3 SOLUTION RESPIRATORY (INHALATION) EVERY 6 HOURS PRN
Status: DISCONTINUED | OUTPATIENT
Start: 2025-08-01 | End: 2025-08-01

## 2025-08-01 RX ORDER — FUROSEMIDE 40 MG/1
40 TABLET ORAL DAILY PRN
Status: DISPENSED | OUTPATIENT
Start: 2025-08-01

## 2025-08-01 RX ORDER — ENOXAPARIN SODIUM 100 MG/ML
40 INJECTION SUBCUTANEOUS EVERY 12 HOURS SCHEDULED
Status: DISPENSED | OUTPATIENT
Start: 2025-08-01

## 2025-08-01 RX ORDER — ROSUVASTATIN CALCIUM 20 MG/1
20 TABLET, COATED ORAL DAILY
Status: DISPENSED | OUTPATIENT
Start: 2025-08-01

## 2025-08-01 RX ORDER — ONDANSETRON HYDROCHLORIDE 2 MG/ML
4 INJECTION, SOLUTION INTRAVENOUS EVERY 4 HOURS PRN
Status: DISCONTINUED | OUTPATIENT
Start: 2025-08-01 | End: 2025-08-01

## 2025-08-01 RX ORDER — ONDANSETRON HYDROCHLORIDE 2 MG/ML
4 INJECTION, SOLUTION INTRAVENOUS ONCE
Status: COMPLETED | OUTPATIENT
Start: 2025-08-01 | End: 2025-08-01

## 2025-08-01 RX ORDER — LISINOPRIL 10 MG/1
10 TABLET ORAL DAILY
Status: DISPENSED | OUTPATIENT
Start: 2025-08-01

## 2025-08-01 RX ORDER — HYDROMORPHONE HYDROCHLORIDE 1 MG/ML
1 INJECTION, SOLUTION INTRAMUSCULAR; INTRAVENOUS; SUBCUTANEOUS EVERY 2 HOUR PRN
Status: DISPENSED | OUTPATIENT
Start: 2025-08-01

## 2025-08-01 RX ORDER — POLYETHYLENE GLYCOL 3350 17 G/17G
17 POWDER, FOR SOLUTION ORAL DAILY
Status: DISPENSED | OUTPATIENT
Start: 2025-08-01

## 2025-08-01 RX ORDER — HYDROXYZINE HYDROCHLORIDE 25 MG/1
25 TABLET, FILM COATED ORAL 2 TIMES DAILY PRN
Status: ACTIVE | OUTPATIENT
Start: 2025-08-01

## 2025-08-01 RX ORDER — HYDROXYZINE PAMOATE 25 MG/1
25 CAPSULE ORAL 2 TIMES DAILY PRN
Status: ON HOLD | COMMUNITY
Start: 2025-05-29

## 2025-08-01 RX ORDER — ASPIRIN 81 MG/1
81 TABLET ORAL DAILY
Status: DISPENSED | OUTPATIENT
Start: 2025-08-01

## 2025-08-01 RX ORDER — DEXTROSE 50 % IN WATER (D50W) INTRAVENOUS SYRINGE
12.5
Status: ACTIVE | OUTPATIENT
Start: 2025-08-01

## 2025-08-01 RX ORDER — SULFASALAZINE 500 MG/1
500 TABLET ORAL 4 TIMES DAILY
Status: DISCONTINUED | OUTPATIENT
Start: 2025-08-01 | End: 2025-08-01

## 2025-08-01 RX ORDER — PRAMIPEXOLE DIHYDROCHLORIDE 0.5 MG/1
0.5 TABLET ORAL DAILY PRN
Status: DISPENSED | OUTPATIENT
Start: 2025-08-01

## 2025-08-01 RX ORDER — TIZANIDINE 4 MG/1
4 TABLET ORAL NIGHTLY PRN
Status: DISPENSED | OUTPATIENT
Start: 2025-08-01

## 2025-08-01 RX ORDER — ONDANSETRON HYDROCHLORIDE 2 MG/ML
INJECTION, SOLUTION INTRAVENOUS
Status: COMPLETED
Start: 2025-08-01 | End: 2025-08-01

## 2025-08-01 RX ORDER — INSULIN LISPRO 100 [IU]/ML
0-20 INJECTION, SOLUTION INTRAVENOUS; SUBCUTANEOUS
Status: DISPENSED | OUTPATIENT
Start: 2025-08-01

## 2025-08-01 RX ORDER — PROCHLORPERAZINE EDISYLATE 5 MG/ML
10 INJECTION INTRAMUSCULAR; INTRAVENOUS EVERY 6 HOURS PRN
Status: DISPENSED | OUTPATIENT
Start: 2025-08-01

## 2025-08-01 RX ORDER — METOPROLOL SUCCINATE 50 MG/1
50 TABLET, EXTENDED RELEASE ORAL DAILY
Status: DISPENSED | OUTPATIENT
Start: 2025-08-01

## 2025-08-01 RX ORDER — INSULIN GLARGINE 100 [IU]/ML
80 INJECTION, SOLUTION SUBCUTANEOUS DAILY
Status: DISPENSED | OUTPATIENT
Start: 2025-08-02

## 2025-08-01 RX ORDER — HYDROMORPHONE HYDROCHLORIDE 1 MG/ML
1 INJECTION, SOLUTION INTRAMUSCULAR; INTRAVENOUS; SUBCUTANEOUS ONCE
Status: COMPLETED | OUTPATIENT
Start: 2025-08-01 | End: 2025-08-01

## 2025-08-01 RX ORDER — ACETAMINOPHEN 325 MG/1
500 TABLET ORAL EVERY 4 HOURS PRN
Status: DISPENSED | OUTPATIENT
Start: 2025-08-01

## 2025-08-01 RX ORDER — ALBUTEROL SULFATE 0.83 MG/ML
3 SOLUTION RESPIRATORY (INHALATION) EVERY 2 HOUR PRN
Status: DISPENSED | OUTPATIENT
Start: 2025-08-01

## 2025-08-01 RX ORDER — SULFASALAZINE 500 MG/1
500 TABLET ORAL 3 TIMES DAILY
Status: DISPENSED | OUTPATIENT
Start: 2025-08-01

## 2025-08-01 RX ORDER — FLUTICASONE PROPIONATE 50 MCG
2 SPRAY, SUSPENSION (ML) NASAL DAILY
Status: ACTIVE | OUTPATIENT
Start: 2025-08-01

## 2025-08-01 RX ORDER — PANTOPRAZOLE SODIUM 40 MG/1
40 TABLET, DELAYED RELEASE ORAL
Status: DISPENSED | OUTPATIENT
Start: 2025-08-02

## 2025-08-01 RX ORDER — QUETIAPINE FUMARATE 100 MG/1
400 TABLET, FILM COATED ORAL NIGHTLY
Status: DISPENSED | OUTPATIENT
Start: 2025-08-01

## 2025-08-01 RX ORDER — DOXYCYCLINE HYCLATE 100 MG
100 TABLET ORAL ONCE
Status: COMPLETED | OUTPATIENT
Start: 2025-08-01 | End: 2025-08-01

## 2025-08-01 RX ORDER — HYDROMORPHONE HYDROCHLORIDE 1 MG/ML
0.6 INJECTION, SOLUTION INTRAMUSCULAR; INTRAVENOUS; SUBCUTANEOUS EVERY 4 HOURS PRN
Status: DISPENSED | OUTPATIENT
Start: 2025-08-01

## 2025-08-01 RX ORDER — MORPHINE SULFATE 4 MG/ML
4 INJECTION INTRAVENOUS ONCE
Status: COMPLETED | OUTPATIENT
Start: 2025-08-01 | End: 2025-08-01

## 2025-08-01 RX ORDER — PRAMIPEXOLE DIHYDROCHLORIDE 1 MG/1
1 TABLET ORAL NIGHTLY PRN
Status: DISPENSED | OUTPATIENT
Start: 2025-08-01

## 2025-08-01 RX ORDER — ONDANSETRON HYDROCHLORIDE 2 MG/ML
4 INJECTION, SOLUTION INTRAVENOUS EVERY 6 HOURS PRN
Status: DISPENSED | OUTPATIENT
Start: 2025-08-01

## 2025-08-01 RX ORDER — DEXTROSE 50 % IN WATER (D50W) INTRAVENOUS SYRINGE
25
Status: ACTIVE | OUTPATIENT
Start: 2025-08-01

## 2025-08-01 RX ADMIN — SULFASALAZINE 500 MG: 500 TABLET ORAL at 13:28

## 2025-08-01 RX ADMIN — QUETIAPINE FUMARATE 400 MG: 100 TABLET ORAL at 20:19

## 2025-08-01 RX ADMIN — SULFASALAZINE 500 MG: 500 TABLET ORAL at 20:19

## 2025-08-01 RX ADMIN — HYDROMORPHONE HYDROCHLORIDE 0.4 MG: 1 INJECTION, SOLUTION INTRAMUSCULAR; INTRAVENOUS; SUBCUTANEOUS at 18:08

## 2025-08-01 RX ADMIN — ROSUVASTATIN CALCIUM 20 MG: 20 TABLET, FILM COATED ORAL at 13:27

## 2025-08-01 RX ADMIN — HYDROMORPHONE HYDROCHLORIDE 1 MG: 1 INJECTION, SOLUTION INTRAMUSCULAR; INTRAVENOUS; SUBCUTANEOUS at 08:27

## 2025-08-01 RX ADMIN — ACETAMINOPHEN 487.5 MG: 325 TABLET ORAL at 20:19

## 2025-08-01 RX ADMIN — TIZANIDINE 4 MG: 4 TABLET ORAL at 20:19

## 2025-08-01 RX ADMIN — HYDROMORPHONE HYDROCHLORIDE 2 MG: 2 INJECTION INTRAMUSCULAR; INTRAVENOUS; SUBCUTANEOUS at 09:55

## 2025-08-01 RX ADMIN — LISINOPRIL 10 MG: 10 TABLET ORAL at 13:27

## 2025-08-01 RX ADMIN — METOPROLOL SUCCINATE 50 MG: 50 TABLET, EXTENDED RELEASE ORAL at 13:28

## 2025-08-01 RX ADMIN — ONDANSETRON HYDROCHLORIDE 4 MG: 2 INJECTION, SOLUTION INTRAVENOUS at 10:57

## 2025-08-01 RX ADMIN — HYDROMORPHONE HYDROCHLORIDE 0.4 MG: 1 INJECTION, SOLUTION INTRAMUSCULAR; INTRAVENOUS; SUBCUTANEOUS at 14:46

## 2025-08-01 RX ADMIN — ONDANSETRON 4 MG: 2 INJECTION, SOLUTION INTRAMUSCULAR; INTRAVENOUS at 10:57

## 2025-08-01 RX ADMIN — ENOXAPARIN SODIUM 40 MG: 100 INJECTION SUBCUTANEOUS at 13:29

## 2025-08-01 RX ADMIN — LAMOTRIGINE 150 MG: 100 TABLET ORAL at 13:28

## 2025-08-01 RX ADMIN — DOXYCYCLINE HYCLATE 100 MG: 100 TABLET, COATED ORAL at 08:27

## 2025-08-01 RX ADMIN — ONDANSETRON 4 MG: 2 INJECTION, SOLUTION INTRAMUSCULAR; INTRAVENOUS at 20:46

## 2025-08-01 RX ADMIN — PRAMIPEXOLE DIHYDROCHLORIDE 1 MG: 1 TABLET ORAL at 18:12

## 2025-08-01 RX ADMIN — ASPIRIN 81 MG: 81 TABLET, DELAYED RELEASE ORAL at 13:28

## 2025-08-01 RX ADMIN — HYDROMORPHONE HYDROCHLORIDE 1 MG: 1 INJECTION, SOLUTION INTRAMUSCULAR; INTRAVENOUS; SUBCUTANEOUS at 20:26

## 2025-08-01 RX ADMIN — LAMOTRIGINE 150 MG: 100 TABLET ORAL at 20:19

## 2025-08-01 RX ADMIN — MORPHINE SULFATE 4 MG: 4 INJECTION INTRAVENOUS at 07:45

## 2025-08-01 RX ADMIN — ONDANSETRON 4 MG: 2 INJECTION, SOLUTION INTRAMUSCULAR; INTRAVENOUS at 07:45

## 2025-08-01 SDOH — SOCIAL STABILITY: SOCIAL INSECURITY
WITHIN THE LAST YEAR, HAVE YOU BEEN RAPED OR FORCED TO HAVE ANY KIND OF SEXUAL ACTIVITY BY YOUR PARTNER OR EX-PARTNER?: NO

## 2025-08-01 SDOH — SOCIAL STABILITY: SOCIAL INSECURITY: WITHIN THE LAST YEAR, HAVE YOU BEEN AFRAID OF YOUR PARTNER OR EX-PARTNER?: NO

## 2025-08-01 SDOH — SOCIAL STABILITY: SOCIAL INSECURITY
WITHIN THE LAST YEAR, HAVE YOU BEEN KICKED, HIT, SLAPPED, OR OTHERWISE PHYSICALLY HURT BY YOUR PARTNER OR EX-PARTNER?: NO

## 2025-08-01 SDOH — ECONOMIC STABILITY: FOOD INSECURITY: WITHIN THE PAST 12 MONTHS, THE FOOD YOU BOUGHT JUST DIDN'T LAST AND YOU DIDN'T HAVE MONEY TO GET MORE.: NEVER TRUE

## 2025-08-01 SDOH — SOCIAL STABILITY: SOCIAL INSECURITY: ABUSE: ADULT

## 2025-08-01 SDOH — SOCIAL STABILITY: SOCIAL INSECURITY: DO YOU FEEL ANYONE HAS EXPLOITED OR TAKEN ADVANTAGE OF YOU FINANCIALLY OR OF YOUR PERSONAL PROPERTY?: NO

## 2025-08-01 SDOH — ECONOMIC STABILITY: FOOD INSECURITY: WITHIN THE PAST 12 MONTHS, YOU WORRIED THAT YOUR FOOD WOULD RUN OUT BEFORE YOU GOT THE MONEY TO BUY MORE.: NEVER TRUE

## 2025-08-01 SDOH — SOCIAL STABILITY: SOCIAL INSECURITY: HAVE YOU HAD THOUGHTS OF HARMING ANYONE ELSE?: NO

## 2025-08-01 SDOH — SOCIAL STABILITY: SOCIAL INSECURITY: HAVE YOU HAD ANY THOUGHTS OF HARMING ANYONE ELSE?: NO

## 2025-08-01 SDOH — ECONOMIC STABILITY: INCOME INSECURITY: IN THE PAST 12 MONTHS HAS THE ELECTRIC, GAS, OIL, OR WATER COMPANY THREATENED TO SHUT OFF SERVICES IN YOUR HOME?: NO

## 2025-08-01 SDOH — SOCIAL STABILITY: SOCIAL INSECURITY: WITHIN THE LAST YEAR, HAVE YOU BEEN HUMILIATED OR EMOTIONALLY ABUSED IN OTHER WAYS BY YOUR PARTNER OR EX-PARTNER?: NO

## 2025-08-01 SDOH — ECONOMIC STABILITY: FOOD INSECURITY: HOW HARD IS IT FOR YOU TO PAY FOR THE VERY BASICS LIKE FOOD, HOUSING, MEDICAL CARE, AND HEATING?: NOT HARD AT ALL

## 2025-08-01 SDOH — SOCIAL STABILITY: SOCIAL INSECURITY: DOES ANYONE TRY TO KEEP YOU FROM HAVING/CONTACTING OTHER FRIENDS OR DOING THINGS OUTSIDE YOUR HOME?: NO

## 2025-08-01 SDOH — SOCIAL STABILITY: SOCIAL INSECURITY: DO YOU FEEL UNSAFE GOING BACK TO THE PLACE WHERE YOU ARE LIVING?: NO

## 2025-08-01 SDOH — SOCIAL STABILITY: SOCIAL INSECURITY: ARE THERE ANY APPARENT SIGNS OF INJURIES/BEHAVIORS THAT COULD BE RELATED TO ABUSE/NEGLECT?: NO

## 2025-08-01 SDOH — SOCIAL STABILITY: SOCIAL INSECURITY: WERE YOU ABLE TO COMPLETE ALL THE BEHAVIORAL HEALTH SCREENINGS?: YES

## 2025-08-01 SDOH — SOCIAL STABILITY: SOCIAL INSECURITY: HAS ANYONE EVER THREATENED TO HURT YOUR FAMILY OR YOUR PETS?: NO

## 2025-08-01 SDOH — SOCIAL STABILITY: SOCIAL INSECURITY: ARE YOU OR HAVE YOU BEEN THREATENED OR ABUSED PHYSICALLY, EMOTIONALLY, OR SEXUALLY BY ANYONE?: NO

## 2025-08-01 ASSESSMENT — COGNITIVE AND FUNCTIONAL STATUS - GENERAL
STANDING UP FROM CHAIR USING ARMS: A LITTLE
DAILY ACTIVITIY SCORE: 24
CLIMB 3 TO 5 STEPS WITH RAILING: A LITTLE
PATIENT BASELINE BEDBOUND: NO
WALKING IN HOSPITAL ROOM: A LITTLE
MOVING TO AND FROM BED TO CHAIR: A LITTLE
WALKING IN HOSPITAL ROOM: A LITTLE
MOBILITY SCORE: 20
STANDING UP FROM CHAIR USING ARMS: A LITTLE
MOBILITY SCORE: 20
CLIMB 3 TO 5 STEPS WITH RAILING: A LITTLE
MOVING TO AND FROM BED TO CHAIR: A LITTLE
MOVING TO AND FROM BED TO CHAIR: A LITTLE
DAILY ACTIVITIY SCORE: 24
WALKING IN HOSPITAL ROOM: A LITTLE
STANDING UP FROM CHAIR USING ARMS: A LITTLE
MOBILITY SCORE: 20
CLIMB 3 TO 5 STEPS WITH RAILING: A LITTLE

## 2025-08-01 ASSESSMENT — ENCOUNTER SYMPTOMS
DIZZINESS: 0
ACTIVITY CHANGE: 0
NAUSEA: 1
WHEEZING: 0
DIFFICULTY URINATING: 0
SHORTNESS OF BREATH: 0
FEVER: 0
LIGHT-HEADEDNESS: 0
COLOR CHANGE: 1
CHILLS: 0
CHEST TIGHTNESS: 0
ABDOMINAL PAIN: 0
PALPITATIONS: 0
COUGH: 0
DYSURIA: 0
CONSTIPATION: 0
FATIGUE: 0
VOMITING: 1
HEADACHES: 0
APPETITE CHANGE: 1
DIARRHEA: 0
WOUND: 1

## 2025-08-01 ASSESSMENT — ACTIVITIES OF DAILY LIVING (ADL)
GROOMING: INDEPENDENT
BATHING: INDEPENDENT
PATIENT'S MEMORY ADEQUATE TO SAFELY COMPLETE DAILY ACTIVITIES?: YES
JUDGMENT_ADEQUATE_SAFELY_COMPLETE_DAILY_ACTIVITIES: YES
HEARING - RIGHT EAR: FUNCTIONAL
HEARING - LEFT EAR: FUNCTIONAL
FEEDING YOURSELF: INDEPENDENT
LACK_OF_TRANSPORTATION: NO
WALKS IN HOME: INDEPENDENT
TOILETING: INDEPENDENT
ADEQUATE_TO_COMPLETE_ADL: YES
LACK_OF_TRANSPORTATION: NO
DRESSING YOURSELF: INDEPENDENT

## 2025-08-01 ASSESSMENT — PAIN - FUNCTIONAL ASSESSMENT
PAIN_FUNCTIONAL_ASSESSMENT: 0-10

## 2025-08-01 ASSESSMENT — PAIN SCALES - GENERAL
PAINLEVEL_OUTOF10: 1
PAINLEVEL_OUTOF10: 0 - NO PAIN
PAINLEVEL_OUTOF10: 8
PAINLEVEL_OUTOF10: 0 - NO PAIN
PAINLEVEL_OUTOF10: 10 - WORST POSSIBLE PAIN
PAINLEVEL_OUTOF10: 8
PAINLEVEL_OUTOF10: 5 - MODERATE PAIN

## 2025-08-01 ASSESSMENT — LIFESTYLE VARIABLES
HAVE YOU EVER FELT YOU SHOULD CUT DOWN ON YOUR DRINKING: NO
TOTAL SCORE: 0
AUDIT-C TOTAL SCORE: 0
EVER FELT BAD OR GUILTY ABOUT YOUR DRINKING: NO
AUDIT-C TOTAL SCORE: 0
HAVE PEOPLE ANNOYED YOU BY CRITICIZING YOUR DRINKING: NO
EVER HAD A DRINK FIRST THING IN THE MORNING TO STEADY YOUR NERVES TO GET RID OF A HANGOVER: NO
HOW MANY STANDARD DRINKS CONTAINING ALCOHOL DO YOU HAVE ON A TYPICAL DAY: PATIENT DOES NOT DRINK
HOW OFTEN DO YOU HAVE A DRINK CONTAINING ALCOHOL: NEVER
HOW OFTEN DO YOU HAVE 6 OR MORE DRINKS ON ONE OCCASION: NEVER
SKIP TO QUESTIONS 9-10: 1

## 2025-08-01 ASSESSMENT — PAIN DESCRIPTION - LOCATION
LOCATION: LEG

## 2025-08-01 ASSESSMENT — PAIN DESCRIPTION - DESCRIPTORS
DESCRIPTORS: SHARP;BURNING
DESCRIPTORS: SQUEEZING;THROBBING

## 2025-08-01 ASSESSMENT — PATIENT HEALTH QUESTIONNAIRE - PHQ9
1. LITTLE INTEREST OR PLEASURE IN DOING THINGS: NOT AT ALL
SUM OF ALL RESPONSES TO PHQ9 QUESTIONS 1 & 2: 0
2. FEELING DOWN, DEPRESSED OR HOPELESS: NOT AT ALL

## 2025-08-01 ASSESSMENT — PAIN DESCRIPTION - ORIENTATION
ORIENTATION: LEFT;LOWER
ORIENTATION: LEFT
ORIENTATION: LEFT

## 2025-08-01 ASSESSMENT — PAIN DESCRIPTION - PAIN TYPE: TYPE: CHRONIC PAIN

## 2025-08-01 NOTE — ED TRIAGE NOTES
"Pt has a wound on her lower left leg that she started seeing wound clinic for about a month ago. Pt states she had an appointment today, but the pain was too bad to wait any longer. Pt states that her leg was \"very red and hurts really bad\". Pt took a percocet around 0400 and states it didn't help any with the pain.   "

## 2025-08-01 NOTE — PROGRESS NOTES
Pharmacy Medication History Review    Yessy Ortega is a 59 y.o. female admitted for Leg wound, left, initial encounter. Pharmacy reviewed the patient's [id number] medications and allergies for accuracy.    The list below reflectives the updated PTA list. Please review each medication in order reconciliation for additional clarification and justification.  Prior to Admission Medications   Prescriptions Last Dose Informant Patient Reported? Taking?   EPINEPHrine 0.3 mg/0.3 mL injection syringe  Self     Sig: Inject 0.3 mL (0.3 mg) into the muscle 1 time if needed for anaphylaxis for up to 6 doses. Use as directed for anaphylaxis reaction   FreeStyle Erika 14 Day Sensor kit  Self     Sig: Use as instructed change every 14 days   FreeStyle Erika 3 [place] misc  Self     Sig: Use as instructed   QUEtiapine (SEROquel) 200 mg tablet 2025 Evening Self Yes Yes   Sig: Take 2 tablets (400 mg) by mouth once daily at bedtime.   acetaminophen (Tylenol) 500 mg tablet 2025 at  4:00 AM Self Yes Yes   Sig: Take 1 tablet (500 mg) by mouth. EVERY 4 TO 6 HOURS AS NEEDED.   albuterol (Ventolin HFA) 90 mcg/actuation inhaler Unknown Self Yes Yes   Sig: Inhale 2 puffs every 4 hours if needed for wheezing or shortness of breath.   aspirin 81 mg EC tablet 2025 Evening Self Yes Yes   Sig: Take 1 tablet (81 mg) by mouth once daily.   blood-glucose sensor (FreeStyle Erika 3 Sensor) device  Self     Si each every 14 (fourteen) days.   flash glucose sensor kit (FreeStyle Erika 14 Day Sensor) kit  Self     Sig: use as directed CHANGE EVERY 14 DAYS   fluticasone (Flonase) 50 mcg/actuation nasal spray Unknown Self Yes Yes   Sig: Administer 2 sprays into each nostril once daily.   furosemide (Lasix) 40 mg tablet 2025 Self Yes Yes   Sig: Take 1 tablet (40 mg) by mouth once daily as needed (edema).   hydrOXYzine pamoate (Vistaril) 25 mg capsule 2025 Self Yes Yes   Sig: Take 1 capsule (25 mg) by mouth 2 times a day  "as needed.   insulin glargine (Lantus Solostar U-100 Insulin) 100 unit/mL (3 mL) pen 7/31/2025 Morning Self Yes Yes   Sig: INJECT UP TO 80 UNITS UNDER THE SKIN ONE TIME DAILY AS DIRECTED   insulin lispro (HumaLOG KwikPen Insulin) 100 unit/mL pen 8/1/2025 at  6:00 AM Self Yes Yes   Sig: INJECT SUBCUTANEOUSLY UP  UNITS ONE TIME DAILY   lamoTRIgine (LaMICtal) 150 mg tablet 7/31/2025 Evening Self Yes Yes   Sig: Take 1 tablet (150 mg) by mouth 2 times a day.   lisinopril 10 mg tablet 7/31/2025 Morning Self Yes Yes   Sig: TAKE ONE TABLET BY MOUTH ONCE A  DAY   multivit-min/iron/folic acid/K (ADULTS MULTIVITAMIN ORAL) 7/30/2025 Self Yes Yes   Sig: Take by mouth. As directed   nebivolol (Bystolic) 10 mg tablet 7/29/2025 Self Yes Yes   Sig: Take 0.5 tablets (5 mg) by mouth once daily.   pen needle, diabetic (BD Griselda 2nd Gen Pen Needle) 32 gauge x 5/32\" needle  Self     Sig: Use 1 Pen needle 2 times a day.   pramipexole (Mirapex) 0.5 mg tablet 7/31/2025 Evening Self Yes Yes   Sig: TAKE 1 TABLET BY MOUTH  IN THE MORNING  as needed and  2 before bedtime   rosuvastatin (Crestor) 20 mg tablet 7/31/2025 Evening Self Yes Yes   Sig: Take 1 tablet (20 mg) by mouth once daily.   sulfaSALAzine (Azulfidine) 500 mg tablet 7/31/2025 Noon Self Yes Yes   Sig: Take 1 tablet (500 mg) by mouth 4 times a day.   Patient taking differently: Take 1 tablet (500 mg) by mouth 3 times a day.   tiZANidine (Zanaflex) 4 mg tablet 7/31/2025 Evening Self Yes Yes   Sig: TAKE ONE TABLET BY MOUTH AT BEDTIME AS NEEDED FOR MUSCLE SPAMS      Facility-Administered Medications: None           The list below reflectives the updated allergy list. Please review each documented allergy for additional clarification and justification.  Allergies  Reviewed by Vonda Brumfield, APRN-CNP on 8/1/2025        Severity Reactions Comments    Ozempic [semaglutide] High Diarrhea     Janumet [sitagliptin Phos-metformin] Medium Diarrhea     Toujeo Max U-300 Solostar " [insulin Glargine U-300 Conc] Medium Diarrhea     Dulaglutide Not Specified Other     Metformin Not Specified Diarrhea     Penicillins Not Specified Other     Pioglitazone Hcl Not Specified Hives, Other dyspnea    Venlafaxine Not Specified Diarrhea             Below are additional concerns with the patient's PTA list.      Rianna Diamond

## 2025-08-01 NOTE — CARE PLAN
The patient's goals for the shift include  control pain    The clinical goals for the shift include pain control        Problem: Pain - Adult  Goal: Verbalizes/displays adequate comfort level or baseline comfort level  Outcome: Progressing     Problem: Safety - Adult  Goal: Free from fall injury  Outcome: Progressing     Problem: Chronic Conditions and Co-morbidities  Goal: Patient's chronic conditions and co-morbidity symptoms are monitored and maintained or improved  Outcome: Progressing

## 2025-08-01 NOTE — H&P
History Of Present Illness  Yessy Ortega is a 59 y.o. female with a PMH of HTN, T2DM, Hypercholesterolemia,  lymphedema, LE abscess, cellulitis, MDD, arthritis, umbilical hernia s/p repair, JULIO C, diabetic retinopathy, morbid obesity, restless leg syndrome, gout, lumbar radiculopathy, Bipolar depression, and ulcerative colitis who presented to the ED on 8/1/2025 for acutely worsening lower left leg pain and abrasion. The abrasion first started in March when she scratched at her leg while it was acutely swollen. The pain started on Monday after wound care (who she sees routinely) wrapped her leg for lymphedema and the pressure hurt so much she had to take the wrap off. The patient said the pain reached 10/10 pain this morning and came in. Patient reported applying antibiotic cream, BNW, plantain salve, and a prescription antibiotic cream she had left over from her PCP. She also took a percocet she had left from shoulder surgery and Tylenol with no relief.    Patient endorses some pain with ambulation, and oozing from the wound. She denies any falls, trauma, chest pain, chills, or shortness of breath.      ED Course:  Vitals: T 35.9 (96.6), /95, HR 80, RR 18, SpO2 99  Labs:  CMP - BUN 27, Creatinine 1.15 EGFR 55, Glucose 116   CBC- wnl     Imaging:   XR Tib/Fib Left   8/1/25  Diffuse subcutaneous soft tissue edema  Mild degenerative changes of the knee     CT Tib/Fib L - pending     Interventions:   - Doxycycline (100mg)   - Dilaudid (1mg)   - Dilaudid (2mg)   - Morphine injection (4mg)   - Zofran IV (4mg)        Past Medical History  Medical History[1]     Surgical History  Surgical History[2]     Social History   reports that she has never smoked. She has never been exposed to tobacco smoke. She has never used smokeless tobacco. She reports that she does not drink alcohol and does not use drugs.    Allergies  RX Allergies[3]     Review of Systems  Review of Systems   Constitutional:  Negative for chills and  fever.   Respiratory:  Negative for cough, chest tightness and shortness of breath.    Cardiovascular:  Negative for chest pain.   Skin:  Positive for color change and wound (LLE).           Objective    Temp:  [35.9 °C (96.6 °F)] 35.9 °C (96.6 °F)  Heart Rate:  [71-80] 71  Resp:  [16-18] 16  BP: (152-211)/(71-95) 185/79    Physical Exam  Constitutional:       General: She is not in acute distress.     Appearance: She is obese.     Cardiovascular:      Rate and Rhythm: Normal rate and regular rhythm.      Heart sounds: No murmur heard.     No friction rub. No gallop.   Pulmonary:      Effort: Pulmonary effort is normal.      Breath sounds: Normal breath sounds.   Abdominal:      General: Abdomen is flat.      Palpations: Abdomen is soft.     Musculoskeletal:         General: Swelling present. No signs of injury.      Right lower leg: Edema present.      Left lower leg: Edema present.     Skin:     Findings: Erythema (LLE) and lesion (LLE) present.     Neurological:      Mental Status: She is oriented to person, place, and time.         Scheduled medications  Scheduled Medications[4]  Continuous medications  Continuous Medications[5]  PRN medications  PRN Medications[6]        Labs Reviewed   BASIC METABOLIC PANEL - Abnormal       Result Value    Glucose 116 (*)     Sodium 139      Potassium 4.0      Chloride 106      Bicarbonate 26      Anion Gap 11      Urea Nitrogen 27 (*)     Creatinine 1.15 (*)     eGFR 55 (*)     Calcium 9.0     POCT GLUCOSE - Abnormal    POCT Glucose 130 (*)    SEDIMENTATION RATE, AUTOMATED - Normal    Sedimentation Rate 26     C-REACTIVE PROTEIN - Normal    C-Reactive Protein 0.98     TISSUE/WOUND CULTURE/SMEAR   CBC WITH AUTO DIFFERENTIAL    WBC 6.7      nRBC 0.0      RBC 4.54      Hemoglobin 13.0      Hematocrit 39.9      MCV 88      MCH 28.6      MCHC 32.6      RDW 12.2      Platelets 227      Neutrophils % 67.9      Immature Granulocytes %, Automated 0.7      Lymphocytes % 18.2       Monocytes % 8.9      Eosinophils % 3.7      Basophils % 0.6      Neutrophils Absolute 4.57      Immature Granulocytes Absolute, Automated 0.05      Lymphocytes Absolute 1.23      Monocytes Absolute 0.60      Eosinophils Absolute 0.25      Basophils Absolute 0.04     POCT GLUCOSE METER   POCT GLUCOSE METER       Recent Imaging     Vascular US PVR without exercise  Result Date: 7/22/2025  Right Lower PVR:  1. No evidence of arterial occlusive disease in the right lower extremity at rest.   2. Normal digital perfusion noted.   3. Multiphasic flow is noted in the right common femoral artery, right posterior tibial artery and right dorsalis pedis artery.     Left Lower PVR:   No evidence of arterial occlusive disease in the left lower extremity at rest.   Normal digital perfusion noted.   Multiphasic flow is noted in the left common femoral artery, left posterior tibial artery and left dorsalis pedis artery.      Assessment/Plan  Yessy Ortega is a 59 y.o. female with a PMH of HTN, T2DM, Hypercholesterolemia,  lymphedema, bipolar disorder, cellulitis, MDD, arthritis, umbilical hernia s/p repair, JULIO C, diabetic retinopathy, morbid obesity, restless leg syndrome, gout, lumbar radiculopathy, and ulcerative colitis who presented to the ED on for acutely worsening lower left leg pain and abrasion with unclear etiology.     ACUTE PROBLEMS:   #Left leg wound with unclear etiology   :WC wnl, afebrile, no chills, no SOB  : well demarcated non purulent wound   : unremarkable Vascular US PVR without exercise   :ddx include: local trauma vs pyoderma gangrenosum vs low suspicion for cellulitis   - CT Left Tib/Fib ordered and pending  - Pain meds prn - Tylenol (487.5mg), Dilaudid (0.4 & 0.6 & 1mg)   - Given normal ESR and CRP, no signs of illness, no leukocytosis, less concern for infection at this time, will hold antibiotics for now  - Gen surg consulted, appreciate recs  - Consider reaching out to derm @ [facility] if symptoms not  improving       CHRONIC PROBLEMS:  #T2DM- Lispro injections (0-20 units TID) before meals, Lantus (80units daily),   #Bipolar disorder- lamortigine (150 mg BID), Seroquel (400mg)   #HTN- lisinopril, metoprolol (50mg daily)   #Chronic rhinitis - fluticasone (2 sprays each nostril)   #Hypercholesterolemia- rosuvastatin (20mg daily)   #Ulcerative Colitis - sulfasalazine (500mg 3x daily)          Global Plan of Care  IVF: none   Diet: normal   DVT prophylaxis: Lovenox   Bowel regimen: polyethylene glycol   Consults:  None   Gi ppx: pantoprazole (40mg)   Dispo:   Code Status: FULL CODE          Effie Christian    Senior attestation: Patient presented to the ER with sudden onset severe pain and wound of left lower extremity after it was wrapped at the wound care clinic. Physical exam shows circular demarcated wound on right anterior leg. X-ray of tib fib was unremarkable, getting CT tib-fib for further clarification. As above, can likely hold off on antibiotics given normal ESR/CRP, normal leukocyte count, no fevers or chills or other signs of infection at this time. General surgery consulted and placed their own wound care orders for now. Supportive care in place. Unclear etiology of wound, could likely be just a reaction or potential skin tear from the wound dressings, however patient does have a history of UC, so pyoderma gangrenosum is in the differential but less likely given aforementioned blood work. Less likely to be PVD as patient had recently had vascular PVR that showed no evidence of arterial disease in either lower extremity.          [1]   Past Medical History:  Diagnosis Date    Abscess of lower leg     Abscess of skin and subcutaneous tissue     Acute tonsillitis, unspecified 11/19/2020    Acute tonsillitis    Benign essential hypertension     Bronchitis     Cellulitis of unspecified part of limb 09/24/2015    Cellulitis of ankle    Contusion of left front wall of thorax     Contusion of left front wall of  thorax, initial encounter 10/05/2018    Contusion, chest wall, left, initial encounter    Cutaneous abscess of limb, unspecified 11/14/2018    Abscess of calf    Cutaneous abscess, unspecified 02/20/2017    Abscess    H/O: depression     History of anaphylaxis     History of chest pain     Hypercholesteremia     Megaureter 08/01/2025    Nephrolithiasis     Other conditions influencing health status     Nephrolithiasis    Personal history of (healed) traumatic fracture 05/26/2022    History of fracture of left ankle    Personal history of anaphylaxis 08/21/2017    History of anaphylaxis    Personal history of arthritis     Personal history of disease of skin and subcutaneous tissue     cellulitis    Personal history of disease of skin and subcutaneous tissue     vasculitis    Personal history of diseases of the skin and subcutaneous tissue 11/03/2014    History of vasculitis of skin    Personal history of diseases of the skin and subcutaneous tissue 06/01/2018    History of cellulitis    Personal history of other diseases of the musculoskeletal system and connective tissue 01/02/2015    History of arthritis    Personal history of other diseases of the respiratory system 05/29/2014    History of acute bronchitis    Personal history of other mental and behavioral disorders     History of major depression    Personal history of other specified conditions 10/05/2018    History of chest pain    S/p tibial fracture     Tonsillitis     Type 2 diabetes mellitus with hyperglycemia, with long-term current use of insulin     Unspecified open wound, left lower leg, initial encounter 12/10/2018    Leg wound, left   [2]   Past Surgical History:  Procedure Laterality Date    ANKLE SURGERY      ANKLE SURGERY  07/12/2013    Ankle Surgery    COLONOSCOPY      COLONOSCOPY  07/12/2013    Complete Colonoscopy    COLONOSCOPY  07/12/2013    Complete Colonoscopy    IR INJECTION NERVE BLOCK      OTHER SURGICAL HISTORY  01/31/2016    Nerve  Block    SHOULDER ARTHROSCOPY  03/07/2025    DR Trejo    UMBILICAL HERNIA REPAIR      UMBILICAL HERNIA REPAIR  07/12/2013    Umbilical Hernia Repair   [3]   Allergies  Allergen Reactions    Ozempic [Semaglutide] Diarrhea    Janumet [Sitagliptin Phos-Metformin] Diarrhea    Toujeo Max U-300 Solostar [Insulin Glargine U-300 Conc] Diarrhea    Dulaglutide Other    Metformin Diarrhea    Penicillins Other    Pioglitazone Hcl Hives and Other     dyspnea    Venlafaxine Diarrhea   [4] aspirin, 81 mg, oral, Daily  enoxaparin, 40 mg, subcutaneous, q12h STEVEN  fluticasone, 2 spray, Each Nostril, Daily  [START ON 8/2/2025] insulin glargine, 80 Units, subcutaneous, Daily  insulin lispro, 0-20 Units, subcutaneous, TID AC  lamoTRIgine, 150 mg, oral, BID  lisinopril, 10 mg, oral, Daily  metoprolol succinate XL, 50 mg, oral, Daily  [START ON 8/2/2025] pantoprazole, 40 mg, oral, Daily before breakfast  polyethylene glycol, 17 g, oral, Daily  QUEtiapine, 400 mg, oral, Nightly  rosuvastatin, 20 mg, oral, Daily  sulfaSALAzine, 500 mg, oral, 4x daily  [5]    [6] PRN medications: acetaminophen, albuterol, dextrose, dextrose, furosemide, glucagon, glucagon, HYDROmorphone, HYDROmorphone, HYDROmorphone, hydrOXYzine HCL, ondansetron, pramipexole, pramipexole, prochlorperazine, tiZANidine

## 2025-08-01 NOTE — PROGRESS NOTES
08/01/25 1120   Discharge Planning   Living Arrangements Spouse/significant other   Support Systems Spouse/significant other   Assistance Needed A&Ox3, independent with ADLs, no DME, room air at baseline, CPAP at night. Does not drive ([employer]). Pt follows with wound care. PCP Dr. Emely Adame   Type of Residence Private residence   Number of Stairs to Enter Residence 3   Number of Stairs Within Residence 0   Home or Post Acute Services None   Expected Discharge Disposition Home   Does the patient need discharge transport arranged? No   Financial Resource Strain   How hard is it for you to pay for the very basics like food, housing, medical care, and heating? Not hard   Housing Stability   In the last 12 months, was there a time when you were not able to pay the mortgage or rent on time? N   At any time in the past 12 months, were you homeless or living in a shelter (including now)? N   Transportation Needs   In the past 12 months, has lack of transportation kept you from medical appointments or from getting medications? no   In the past 12 months, has lack of transportation kept you from meetings, work, or from getting things needed for daily living? No   Stroke Family Assessment   Stroke Family Assessment Needed No   Intensity of Service   Intensity of Service 0-30 min

## 2025-08-01 NOTE — ED PROVIDER NOTES
HPI   Chief Complaint   Patient presents with    Leg Pain       Yessy is a 59-year-old woman who presents with left leg pain.  She has had this pain since Monday when they wrapped her leg up for lymphedema.  At the wound clinic.  She said the pain hurt too much after fear she took the wrap off and has been applying topical antibiotic creams and ointments and thinks she may have had a reaction to 1 with zinc.  She was in significant pain earlier today sharp and burning and took Tylenol with that did not help she had leftover pain pill from her shoulder surgery which was Percocet.  She denies any fevers or chills.  She says the pain is still very uncomfortable.  Xeroform seem to help her before but she has not have any at this time.  She denies any recent trauma or falls.  Is followed by the wound clinic for this wound and had a recent ultrasound with no signs of a DVT.  She said she was told she had good blood flow in her leg.  She is quite uncomfortable and came in for evaluation treatment has an appointment later today with Dr. Mosley in the wound clinic.              Patient History   Medical History[1]  Surgical History[2]  Family History[3]  Social History[4]    Physical Exam   ED Triage Vitals [08/01/25 0654]   Temperature Heart Rate Respirations BP   35.9 °C (96.6 °F) 80 18 (!) 211/95      Pulse Ox Temp Source Heart Rate Source Patient Position   99 % Temporal Monitor Sitting      BP Location FiO2 (%)     Right arm --       Physical Exam  Vitals reviewed.   Constitutional:       General: She is awake.      Comments: Appears uncomfortable   HENT:      Head: Normocephalic.      Nose: Nose normal.     Cardiovascular:      Rate and Rhythm: Normal rate and regular rhythm.   Pulmonary:      Effort: Pulmonary effort is normal.      Breath sounds: Normal breath sounds.   Abdominal:      Palpations: Abdomen is soft.     Musculoskeletal:      Cervical back: Normal range of motion.      Comments: Patient has some  lymphedema bilateral lower extremities.     Skin:     General: Skin is warm.      Capillary Refill: Capillary refill takes less than 2 seconds.      Comments: Left leg has a significant wound on the left tib-fib area see diagram below.  It appears slightly angry from irritation.  There is some yellow serous fluid on the dressing.  No palpable cords.     Neurological:      Mental Status: She is alert.           ED Course & MDM   ED Course as of 08/01/25 1056   Fri Aug 01, 2025   0941 Patient continues to have discomfort even after the morphine and the Dilaudid we will go up on the dose and attempt to hospitalize. [RZ]   1042 I talked to the residents and patient be hospitalized.  She has more nausea and was given additional medication. [RZ]      ED Course User Index  [RZ] Devin Vanegas MD         Diagnoses as of 08/01/25 1056   Leg wound, left, subsequent encounter   Intractable pain                 No data recorded     [place] Coma Scale Score: 15 (08/01/25 0656 : Grecia Fernandez)                           Medical Decision Making      Procedure  Procedures       Devin Vanegas MD  08/01/25 1042         [1]   Past Medical History:  Diagnosis Date    Abscess of lower leg     Abscess of skin and subcutaneous tissue     Acute tonsillitis, unspecified 11/19/2020    Acute tonsillitis    Benign essential hypertension     Bronchitis     Cellulitis of unspecified part of limb 09/24/2015    Cellulitis of ankle    Contusion of left front wall of thorax     Contusion of left front wall of thorax, initial encounter 10/05/2018    Contusion, chest wall, left, initial encounter    Cutaneous abscess of limb, unspecified 11/14/2018    Abscess of calf    Cutaneous abscess, unspecified 02/20/2017    Abscess    H/O: depression     History of anaphylaxis     History of chest pain     Hypercholesteremia     Nephrolithiasis     Other conditions influencing health status     Nephrolithiasis    Personal history of  (healed) traumatic fracture 05/26/2022    History of fracture of left ankle    Personal history of anaphylaxis 08/21/2017    History of anaphylaxis    Personal history of arthritis     Personal history of disease of skin and subcutaneous tissue     cellulitis    Personal history of disease of skin and subcutaneous tissue     vasculitis    Personal history of diseases of the skin and subcutaneous tissue 11/03/2014    History of vasculitis of skin    Personal history of diseases of the skin and subcutaneous tissue 06/01/2018    History of cellulitis    Personal history of other diseases of the musculoskeletal system and connective tissue 01/02/2015    History of arthritis    Personal history of other diseases of the respiratory system 05/29/2014    History of acute bronchitis    Personal history of other mental and behavioral disorders     History of major depression    Personal history of other specified conditions 10/05/2018    History of chest pain    S/p tibial fracture     Tonsillitis     Type 2 diabetes mellitus with hyperglycemia, with long-term current use of insulin     Unspecified open wound, left lower leg, initial encounter 12/10/2018    Leg wound, left   [2]   Past Surgical History:  Procedure Laterality Date    ANKLE SURGERY      ANKLE SURGERY  07/12/2013    Ankle Surgery    COLONOSCOPY      COLONOSCOPY  07/12/2013    Complete Colonoscopy    COLONOSCOPY  07/12/2013    Complete Colonoscopy    IR INJECTION NERVE BLOCK      OTHER SURGICAL HISTORY  01/31/2016    Nerve Block    SHOULDER ARTHROSCOPY  03/07/2025    DR Trejo    UMBILICAL HERNIA REPAIR      UMBILICAL HERNIA REPAIR  07/12/2013    Umbilical Hernia Repair   [3]   Family History  Problem Relation Name Age of Onset    Hypertension Mother      Diabetes type II Mother      Heart disease Mother      Coronary artery disease Father      Malig Hypertension Father      Diabetes type II Father      Uterine cancer Paternal Grandmother     [4]   Social  History  Tobacco Use    Smoking status: Never     Passive exposure: Never    Smokeless tobacco: Never   Vaping Use    Vaping status: Never Used   Substance Use Topics    Alcohol use: Never     Comment: occasional    Drug use: Never        Devin Vanegas MD  08/01/25 1053

## 2025-08-01 NOTE — DISCHARGE INSTRUCTIONS
Follow up with Dr. Mosley in the wound clinic, keep your appointment scheduled for Monday, August 4.  The wound clinic is located around the corner from the ER entrance on the side of [facility].     [place] [id number] [phone]

## 2025-08-01 NOTE — CONSULTS
Reason For Consult  Leg wound    History Of Present Illness  Yessy Ortega is a 59 y.o. female presenting with leg wound with inc pain and drainage.    Pt states that her sx have been worsening for 4 days since her visit at the wound center and so she came to [facility] for evaluation and recommendations.     General surgery was consulted for evaluation and recommendations.      Pt states she has had this LLE wound for 4 months. She has been following at the wound center at [facility] for a month.   States that 4 days ago she had a dressing applied and when she went home she had increased pain and so she removed the dressing. She has been using topical neosporin but the pain has been worsening and there has been inc drainage. The size of the wound has not changed but there has been inc in blistering and then these drain yellow fluid.     She denies fever or chills.   She had nausea and vomiting this am.  No diarrhea or constipation.       She has PMHx of HTN, hyperlipidemia, GERD, RLS, JULIO C, UC.     No smoking, no alcohol, no drug use.   Past Medical History  She has a past medical history of Abscess of lower leg, Abscess of skin and subcutaneous tissue, Acute tonsillitis, unspecified (11/19/2020), Benign essential hypertension, Bronchitis, Cellulitis of unspecified part of limb (09/24/2015), Contusion of left front wall of thorax, Contusion of left front wall of thorax, initial encounter (10/05/2018), Cutaneous abscess of limb, unspecified (11/14/2018), Cutaneous abscess, unspecified (02/20/2017), H/O: depression, History of anaphylaxis, History of chest pain, Hypercholesteremia, Megaureter (08/01/2025), Nephrolithiasis, Other conditions influencing health status, Personal history of (healed) traumatic fracture (05/26/2022), Personal history of anaphylaxis (08/21/2017), Personal history of arthritis, Personal history of disease of skin and subcutaneous tissue, Personal history of disease of  skin and subcutaneous tissue, Personal history of diseases of the skin and subcutaneous tissue (11/03/2014), Personal history of diseases of the skin and subcutaneous tissue (06/01/2018), Personal history of other diseases of the musculoskeletal system and connective tissue (01/02/2015), Personal history of other diseases of the respiratory system (05/29/2014), Personal history of other mental and behavioral disorders, Personal history of other specified conditions (10/05/2018), S/p tibial fracture, Tonsillitis, Type 2 diabetes mellitus with hyperglycemia, with long-term current use of insulin, and Unspecified open wound, left lower leg, initial encounter (12/10/2018).    Surgical History  She has a past surgical history that includes Ankle surgery; Colonoscopy; IR injection nerve block; Umbilical hernia repair; Other surgical history (01/31/2016); Colonoscopy (07/12/2013); Umbilical hernia repair (07/12/2013); Colonoscopy (07/12/2013); Ankle surgery (07/12/2013); and Shoulder arthroscopy (03/07/2025).     Social History  She reports that she has never smoked. She has never been exposed to tobacco smoke. She has never used smokeless tobacco. She reports that she does not drink alcohol and does not use drugs.    Family History  Family History[1]     Allergies  Ozempic [semaglutide], Janumet [sitagliptin phos-metformin], Toujeo max u-300 solostar [insulin glargine u-300 conc], Dulaglutide, Metformin, Penicillins, Pioglitazone hcl, and Venlafaxine    Review of Systems  Review of Systems   Constitutional:  Positive for appetite change. Negative for activity change, chills, fatigue and fever.   Respiratory:  Negative for cough, chest tightness, shortness of breath and wheezing.    Cardiovascular:  Negative for chest pain, palpitations and leg swelling.   Gastrointestinal:  Positive for nausea and vomiting. Negative for abdominal pain, constipation and diarrhea.   Genitourinary:  Negative for decreased urine volume,  difficulty urinating and dysuria.   Musculoskeletal:  Positive for gait problem.   Skin:  Positive for wound.   Neurological:  Negative for dizziness, light-headedness and headaches.          Physical Exam  Physical Exam  Vitals reviewed.   Constitutional:       General: She is not in acute distress.     Appearance: Normal appearance. She is obese. She is not ill-appearing, toxic-appearing or diaphoretic.   HENT:      Head: Normocephalic and atraumatic.      Mouth/Throat:      Mouth: Mucous membranes are moist.     Eyes:      General: No scleral icterus.        Right eye: No discharge.         Left eye: No discharge.      Conjunctiva/sclera: Conjunctivae normal.       Cardiovascular:      Rate and Rhythm: Normal rate and regular rhythm.      Pulses: Normal pulses.      Heart sounds: Normal heart sounds. No murmur heard.     No friction rub. No gallop.   Pulmonary:      Effort: Pulmonary effort is normal. No respiratory distress.      Breath sounds: Normal breath sounds. No stridor. No wheezing, rhonchi or rales.   Chest:      Chest wall: No tenderness.   Abdominal:      General: Abdomen is flat. There is no distension.      Palpations: Abdomen is soft. There is no mass.      Tenderness: There is no abdominal tenderness. There is no guarding or rebound.      Hernia: No hernia is present.     Musculoskeletal:         General: Tenderness present.      Right lower leg: No edema.      Left lower leg: No edema.      Comments: Tenderness around open wound on LLE     Skin:     General: Skin is warm and dry.      Capillary Refill: Capillary refill takes less than 2 seconds.      Findings: Erythema present.      Comments: LLE with large open area, erythema, multiple blisters within the wound and clear yellow fluid draining from it. Skin around is without erythema.   Wound is 14.5 cm x 10 cm.     Neurological:      Mental Status: She is alert and oriented to person, place, and time.     Psychiatric:         Mood and Affect:  "Mood normal.         Behavior: Behavior normal.         Thought Content: Thought content normal.         Judgment: Judgment normal.            Last Recorded Vitals  Blood pressure (!) 185/79, pulse 71, temperature 35.9 °C (96.6 °F), temperature source Temporal, resp. rate 16, height 1.575 m (5' 2\"), weight 123 kg (270 lb 6.4 oz), SpO2 92%.    Relevant Results    XR tibia fibula left 2 views  Result Date: 8/1/2025  Interpreted By:  Shaji Martinez, STUDY: XR TIBIA FIBULA LEFT 2 VIEWS; ;  8/1/2025 11:39 am   INDICATION: Signs/Symptoms:leg pain.     COMPARISON: None.   ACCESSION NUMBER(S): [id number]   ORDERING CLINICIAN: SHELBI KAUFFMAN   FINDINGS: Two views of the left tibia/fibula.   No acute fracture. No dislocation. There is diffuse subcutaneous soft tissue edema. Mild degenerative changes of the knee.       Subcutaneous soft tissue edema without acute fracture or dislocation.   MACRO: None   Signed by: Shaji Martinez 8/1/2025 12:27 PM Dictation workstation:   [id number]    Vascular US PVR without exercise  Result Date: 7/22/2025          [place] [address]  Tel [phone] and Fax [phone]  Vascular Lab Report VASC US ANKLE BRACHIAL INDEX (REGI) WITHOUT EXERCISE  Patient Name:      ASHER Hoyt Physician:  [id number] Michelet Bradshaw MD Study Date:        7/21/2025            Ordering Physician: [id number] CANO MRN/PID:           [id number]             Technologist:       Ashlee Howe RVT Accession#:        [id number]         Technologist 2: Date of Birth/Age: 1965 / 59 years Encounter#:         [id number] Gender:            F Admission Status:  Outpatient           Location Performed: [facility]  Diagnosis/ICD: Lymphangitis-I89.1 CPT Codes:     81795 Peripheral artery REGI Only  CONCLUSIONS: Right " Lower PVR: No evidence of arterial occlusive disease in the right lower extremity at rest. Normal digital perfusion noted. Multiphasic flow is noted in the right common femoral artery, right posterior tibial artery and right dorsalis pedis artery. Left Lower PVR: No evidence of arterial occlusive disease in the left lower extremity at rest. Normal digital perfusion noted. Multiphasic flow is noted in the left common femoral artery, left posterior tibial artery and left dorsalis pedis artery.  Imaging & Doppler Findings:  RIGHT Lower PVR                Pressures Ratios Right Posterior Tibial (Ankle) 132 mmHg  0.99 Right Dorsalis Pedis (Ankle)   128 mmHg  0.96 Right Digit (Great Toe)        90 mmHg   0.68   LEFT Lower PVR                Pressures Ratios Left Posterior Tibial (Ankle) 137 mmHg  1.03 Left Dorsalis Pedis (Ankle)   132 mmHg  0.99 Left Digit (Great Toe)        89 mmHg   0.67                     Right     Left Brachial Pressure 133 mmHg 128 mmHg   [id number] Michelet Bradshaw MD Electronically signed by [id number] Michelet Bradshaw MD on 7/22/2025 at 10:14:53 PM  ** Final **       Scheduled medications  Scheduled Medications[2]  Continuous medications  Continuous Medications[3]  PRN medications  PRN Medications[4]  Results for orders placed or performed during the hospital encounter of 08/01/25 (from the past 24 hours)   CBC and Auto Differential   Result Value Ref Range    WBC 6.7 4.4 - 11.3 x10*3/uL    nRBC 0.0 0.0 - 0.0 /100 WBCs    RBC 4.54 4.00 - 5.20 x10*6/uL    Hemoglobin 13.0 12.0 - 16.0 g/dL    Hematocrit 39.9 36.0 - 46.0 %    MCV 88 80 - 100 fL    MCH 28.6 26.0 - 34.0 pg    MCHC 32.6 32.0 - 36.0 g/dL    RDW 12.2 11.5 - 14.5 %    Platelets 227 150 - 450 x10*3/uL    Neutrophils % 67.9 40.0 - 80.0 %    Immature Granulocytes %, Automated 0.7 0.0 - 0.9 %    Lymphocytes % 18.2 13.0 - 44.0 %    Monocytes % 8.9 2.0 - 10.0 %    Eosinophils % 3.7 0.0 - 6.0 %    Basophils % 0.6 0.0 - 2.0 %    Neutrophils  Absolute 4.57 1.20 - 7.70 x10*3/uL    Immature Granulocytes Absolute, Automated 0.05 0.00 - 0.70 x10*3/uL    Lymphocytes Absolute 1.23 1.20 - 4.80 x10*3/uL    Monocytes Absolute 0.60 0.10 - 1.00 x10*3/uL    Eosinophils Absolute 0.25 0.00 - 0.70 x10*3/uL    Basophils Absolute 0.04 0.00 - 0.10 x10*3/uL   Basic metabolic panel   Result Value Ref Range    Glucose 116 (H) 74 - 99 mg/dL    Sodium 139 136 - 145 mmol/L    Potassium 4.0 3.5 - 5.3 mmol/L    Chloride 106 98 - 107 mmol/L    Bicarbonate 26 21 - 32 mmol/L    Anion Gap 11 10 - 20 mmol/L    Urea Nitrogen 27 (H) 6 - 23 mg/dL    Creatinine 1.15 (H) 0.50 - 1.05 mg/dL    eGFR 55 (L) >60 mL/min/1.73m*2    Calcium 9.0 8.6 - 10.3 mg/dL   Sedimentation rate, automated   Result Value Ref Range    Sedimentation Rate 26 0 - 30 mm/h   C-reactive protein   Result Value Ref Range    C-Reactive Protein 0.98 <1.00 mg/dL   POCT GLUCOSE   Result Value Ref Range    POCT Glucose 130 (H) 74 - 99 mg/dL        Assessment/Plan     Pt is a 59 year old female with chronic LLE chronic wound   - imaging reviewed. XR today without fx or dislocation. US done 7/21/25 an no occlusive arterial disease, no blood clot noted. Has not had CT/MRI, this was ordered today by medicine.  - labs reviewed. CBC WNL, MEDINA with normal electrolytes. ESR and CRP are WNL.  - aquacel to open weeping wound and cover with xeroform. Then cover with abd pad and wrap with kerlix. Change dressing daily.   - good hydration.  - DM diet.  - zofran 4 mg q 6 hours PRN.  - pt should cont regular follow up apts with Dr. Mosley in the wound center.     - remainder of care per primary.     Plan of care discussed with Dr. Mosley who is in agreement with plan of care.     Vonda Brumfield, JIM-CNP           [1]   Family History  Problem Relation Name Age of Onset    Hypertension Mother      Diabetes type II Mother      Heart disease Mother      Coronary artery disease Father      Malig Hypertension Father      Diabetes type II  Father      Uterine cancer Paternal Grandmother     [2] aspirin, 81 mg, oral, Daily  enoxaparin, 40 mg, subcutaneous, q12h STEVEN  fluticasone, 2 spray, Each Nostril, Daily  [START ON 8/2/2025] insulin glargine, 80 Units, subcutaneous, Daily  insulin lispro, 0-20 Units, subcutaneous, TID AC  lamoTRIgine, 150 mg, oral, BID  lisinopril, 10 mg, oral, Daily  metoprolol succinate XL, 50 mg, oral, Daily  [START ON 8/2/2025] pantoprazole, 40 mg, oral, Daily before breakfast  polyethylene glycol, 17 g, oral, Daily  QUEtiapine, 400 mg, oral, Nightly  rosuvastatin, 20 mg, oral, Daily  sulfaSALAzine, 500 mg, oral, 4x daily    [3]    [4] PRN medications: acetaminophen, albuterol, dextrose, dextrose, furosemide, glucagon, glucagon, HYDROmorphone, HYDROmorphone, HYDROmorphone, hydrOXYzine HCL, ondansetron, pramipexole, pramipexole, tiZANidine

## 2025-08-02 PROBLEM — S81.802D LEG WOUND, LEFT, SUBSEQUENT ENCOUNTER: Status: ACTIVE | Noted: 2025-08-02

## 2025-08-02 LAB
ALBUMIN SERPL BCP-MCNC: 3.4 G/DL (ref 3.4–5)
ANION GAP SERPL CALC-SCNC: 9 MMOL/L (ref 10–20)
BUN SERPL-MCNC: 27 MG/DL (ref 6–23)
CALCIUM SERPL-MCNC: 8.7 MG/DL (ref 8.6–10.3)
CHLORIDE SERPL-SCNC: 102 MMOL/L (ref 98–107)
CHLORIDE UR-SCNC: 91 MMOL/L
CHLORIDE/CREATININE (MMOL/G) IN URINE: 118 MMOL/G CREAT (ref 38–318)
CO2 SERPL-SCNC: 28 MMOL/L (ref 21–32)
CREAT SERPL-MCNC: 1.75 MG/DL (ref 0.5–1.05)
CREAT UR-MCNC: 77.1 MG/DL (ref 20–320)
CREAT UR-MCNC: 77.1 MG/DL (ref 20–320)
EGFRCR SERPLBLD CKD-EPI 2021: 33 ML/MIN/1.73M*2
ERYTHROCYTE [DISTWIDTH] IN BLOOD BY AUTOMATED COUNT: 12.6 % (ref 11.5–14.5)
GLUCOSE BLD MANUAL STRIP-MCNC: 172 MG/DL (ref 74–99)
GLUCOSE SERPL-MCNC: 253 MG/DL (ref 74–99)
HCT VFR BLD AUTO: 37.6 % (ref 36–46)
HGB BLD-MCNC: 11.6 G/DL (ref 12–16)
MAGNESIUM SERPL-MCNC: 1.85 MG/DL (ref 1.6–2.4)
MCH RBC QN AUTO: 28.2 PG (ref 26–34)
MCHC RBC AUTO-ENTMCNC: 30.9 G/DL (ref 32–36)
MCV RBC AUTO: 92 FL (ref 80–100)
NRBC BLD-RTO: 0 /100 WBCS (ref 0–0)
PHOSPHATE SERPL-MCNC: 3.3 MG/DL (ref 2.5–4.9)
PLATELET # BLD AUTO: 223 X10*3/UL (ref 150–450)
POTASSIUM SERPL-SCNC: 4.6 MMOL/L (ref 3.5–5.3)
POTASSIUM UR-SCNC: 19 MMOL/L
POTASSIUM/CREAT UR-RTO: 25 MMOL/G CREAT
RBC # BLD AUTO: 4.11 X10*6/UL (ref 4–5.2)
SODIUM SERPL-SCNC: 134 MMOL/L (ref 136–145)
SODIUM UR-SCNC: 85 MMOL/L
SODIUM/CREAT UR-RTO: 110 MMOL/G CREAT
UREA/CREAT UR-SRTO: 4.5 G/G CREAT
UUN UR-MCNC: 348 MG/DL
WBC # BLD AUTO: 7.7 X10*3/UL (ref 4.4–11.3)

## 2025-08-02 PROCEDURE — 2500000002 HC RX 250 W HCPCS SELF ADMINISTERED DRUGS (ALT 637 FOR MEDICARE OP, ALT 636 FOR OP/ED)

## 2025-08-02 PROCEDURE — 82436 ASSAY OF URINE CHLORIDE: CPT | Mod: GEALAB

## 2025-08-02 PROCEDURE — 2500000004 HC RX 250 GENERAL PHARMACY W/ HCPCS (ALT 636 FOR OP/ED)

## 2025-08-02 PROCEDURE — 2500000001 HC RX 250 WO HCPCS SELF ADMINISTERED DRUGS (ALT 637 FOR MEDICARE OP)

## 2025-08-02 PROCEDURE — 82570 ASSAY OF URINE CREATININE: CPT | Mod: GEALAB

## 2025-08-02 PROCEDURE — 82947 ASSAY GLUCOSE BLOOD QUANT: CPT

## 2025-08-02 PROCEDURE — 84300 ASSAY OF URINE SODIUM: CPT | Mod: GEALAB

## 2025-08-02 PROCEDURE — 36415 COLL VENOUS BLD VENIPUNCTURE: CPT

## 2025-08-02 PROCEDURE — 99232 SBSQ HOSP IP/OBS MODERATE 35: CPT

## 2025-08-02 PROCEDURE — 85027 COMPLETE CBC AUTOMATED: CPT

## 2025-08-02 PROCEDURE — 2500000002 HC RX 250 W HCPCS SELF ADMINISTERED DRUGS (ALT 637 FOR MEDICARE OP, ALT 636 FOR OP/ED): Performed by: INTERNAL MEDICINE

## 2025-08-02 PROCEDURE — 94640 AIRWAY INHALATION TREATMENT: CPT

## 2025-08-02 PROCEDURE — 1100000001 HC PRIVATE ROOM DAILY

## 2025-08-02 PROCEDURE — 80069 RENAL FUNCTION PANEL: CPT

## 2025-08-02 PROCEDURE — 83735 ASSAY OF MAGNESIUM: CPT

## 2025-08-02 PROCEDURE — 96372 THER/PROPH/DIAG INJ SC/IM: CPT

## 2025-08-02 RX ORDER — FUROSEMIDE 10 MG/ML
40 INJECTION INTRAMUSCULAR; INTRAVENOUS ONCE
Status: DISCONTINUED | OUTPATIENT
Start: 2025-08-02 | End: 2025-08-02

## 2025-08-02 RX ORDER — FUROSEMIDE 10 MG/ML
20 INJECTION INTRAMUSCULAR; INTRAVENOUS ONCE
Status: COMPLETED | OUTPATIENT
Start: 2025-08-02 | End: 2025-08-02

## 2025-08-02 RX ORDER — CEFAZOLIN SODIUM 1 G/50ML
1 SOLUTION INTRAVENOUS EVERY 8 HOURS
Status: DISPENSED | OUTPATIENT
Start: 2025-08-02

## 2025-08-02 RX ADMIN — LISINOPRIL 10 MG: 10 TABLET ORAL at 09:05

## 2025-08-02 RX ADMIN — SULFASALAZINE 500 MG: 500 TABLET ORAL at 20:34

## 2025-08-02 RX ADMIN — LAMOTRIGINE 150 MG: 100 TABLET ORAL at 20:34

## 2025-08-02 RX ADMIN — HYDROMORPHONE HYDROCHLORIDE 0.6 MG: 1 INJECTION, SOLUTION INTRAMUSCULAR; INTRAVENOUS; SUBCUTANEOUS at 20:33

## 2025-08-02 RX ADMIN — HYDROMORPHONE HYDROCHLORIDE 0.6 MG: 1 INJECTION, SOLUTION INTRAMUSCULAR; INTRAVENOUS; SUBCUTANEOUS at 16:25

## 2025-08-02 RX ADMIN — ASPIRIN 81 MG: 81 TABLET, DELAYED RELEASE ORAL at 09:04

## 2025-08-02 RX ADMIN — FUROSEMIDE 20 MG: 10 INJECTION, SOLUTION INTRAMUSCULAR; INTRAVENOUS at 11:02

## 2025-08-02 RX ADMIN — PRAMIPEXOLE DIHYDROCHLORIDE 0.5 MG: 0.5 TABLET ORAL at 18:27

## 2025-08-02 RX ADMIN — INSULIN LISPRO 4 UNITS: 100 INJECTION, SOLUTION INTRAVENOUS; SUBCUTANEOUS at 11:02

## 2025-08-02 RX ADMIN — INSULIN LISPRO 4 UNITS: 100 INJECTION, SOLUTION INTRAVENOUS; SUBCUTANEOUS at 16:25

## 2025-08-02 RX ADMIN — CEFAZOLIN SODIUM 1 G: 1 INJECTION, SOLUTION INTRAVENOUS at 20:34

## 2025-08-02 RX ADMIN — ROSUVASTATIN CALCIUM 20 MG: 20 TABLET, FILM COATED ORAL at 09:04

## 2025-08-02 RX ADMIN — LAMOTRIGINE 150 MG: 100 TABLET ORAL at 09:04

## 2025-08-02 RX ADMIN — SULFASALAZINE 500 MG: 500 TABLET ORAL at 16:25

## 2025-08-02 RX ADMIN — METOPROLOL SUCCINATE 50 MG: 50 TABLET, EXTENDED RELEASE ORAL at 09:04

## 2025-08-02 RX ADMIN — ALBUTEROL SULFATE 3 ML: 2.5 SOLUTION RESPIRATORY (INHALATION) at 11:30

## 2025-08-02 RX ADMIN — INSULIN LISPRO 8 UNITS: 100 INJECTION, SOLUTION INTRAVENOUS; SUBCUTANEOUS at 07:51

## 2025-08-02 RX ADMIN — HYDROMORPHONE HYDROCHLORIDE 0.6 MG: 1 INJECTION, SOLUTION INTRAMUSCULAR; INTRAVENOUS; SUBCUTANEOUS at 07:51

## 2025-08-02 RX ADMIN — ENOXAPARIN SODIUM 40 MG: 100 INJECTION SUBCUTANEOUS at 09:03

## 2025-08-02 RX ADMIN — PANTOPRAZOLE SODIUM 40 MG: 40 TABLET, DELAYED RELEASE ORAL at 05:19

## 2025-08-02 RX ADMIN — SULFASALAZINE 500 MG: 500 TABLET ORAL at 09:04

## 2025-08-02 RX ADMIN — CEFAZOLIN SODIUM 1 G: 1 INJECTION, SOLUTION INTRAVENOUS at 13:26

## 2025-08-02 RX ADMIN — INSULIN GLARGINE 80 UNITS: 100 INJECTION, SOLUTION SUBCUTANEOUS at 09:05

## 2025-08-02 RX ADMIN — POLYETHYLENE GLYCOL 3350 17 G: 17 POWDER, FOR SOLUTION ORAL at 09:04

## 2025-08-02 RX ADMIN — QUETIAPINE FUMARATE 400 MG: 100 TABLET ORAL at 20:34

## 2025-08-02 RX ADMIN — HYDROMORPHONE HYDROCHLORIDE 0.6 MG: 1 INJECTION, SOLUTION INTRAMUSCULAR; INTRAVENOUS; SUBCUTANEOUS at 03:13

## 2025-08-02 RX ADMIN — ENOXAPARIN SODIUM 40 MG: 100 INJECTION SUBCUTANEOUS at 20:36

## 2025-08-02 RX ADMIN — HYDROMORPHONE HYDROCHLORIDE 0.6 MG: 1 INJECTION, SOLUTION INTRAMUSCULAR; INTRAVENOUS; SUBCUTANEOUS at 12:08

## 2025-08-02 ASSESSMENT — COGNITIVE AND FUNCTIONAL STATUS - GENERAL
STANDING UP FROM CHAIR USING ARMS: A LITTLE
MOBILITY SCORE: 20
MOVING TO AND FROM BED TO CHAIR: A LITTLE
CLIMB 3 TO 5 STEPS WITH RAILING: A LITTLE
WALKING IN HOSPITAL ROOM: A LITTLE
DAILY ACTIVITIY SCORE: 24

## 2025-08-02 ASSESSMENT — PAIN - FUNCTIONAL ASSESSMENT
PAIN_FUNCTIONAL_ASSESSMENT: 0-10

## 2025-08-02 ASSESSMENT — PAIN DESCRIPTION - ORIENTATION
ORIENTATION: LEFT

## 2025-08-02 ASSESSMENT — PAIN SCALES - GENERAL
PAINLEVEL_OUTOF10: 9
PAINLEVEL_OUTOF10: 9
PAINLEVEL_OUTOF10: 0 - NO PAIN
PAINLEVEL_OUTOF10: 7
PAINLEVEL_OUTOF10: 10 - WORST POSSIBLE PAIN

## 2025-08-02 ASSESSMENT — PAIN DESCRIPTION - LOCATION
LOCATION: LEG

## 2025-08-02 NOTE — CONSULTS
Consults  Referred by JAMIE Sequeira MD: Emely Garcia MD    Reason For Consult  Lt leg wound    History Of Present Illness  Yessy Ortega is a 59 y.o. female, hx of obesity, hx of DM, hx of HTN, hx of JULIO C, hx of legs stasis, hx of Lt shin wound for few months, has been managed by the wound care center, she was admitted for increased pain and drainage for few days, no fever, her pain is constant, burning, moderate, no radiation, increased by dressing and being up right, her drainage is yellow color mild, her WBC are N, the CT without abscess, some changes suggestive of cellulitis     Past Medical History  She has a past medical history of Abscess of lower leg, Abscess of skin and subcutaneous tissue, Acute tonsillitis, unspecified (11/19/2020), Benign essential hypertension, Bronchitis, Cellulitis of unspecified part of limb (09/24/2015), Contusion of left front wall of thorax, Contusion of left front wall of thorax, initial encounter (10/05/2018), Cutaneous abscess of limb, unspecified (11/14/2018), Cutaneous abscess, unspecified (02/20/2017), H/O: depression, History of anaphylaxis, History of chest pain, Hypercholesteremia, Megaureter (08/01/2025), Nephrolithiasis, Other conditions influencing health status, Personal history of (healed) traumatic fracture (05/26/2022), Personal history of anaphylaxis (08/21/2017), Personal history of arthritis, Personal history of disease of skin and subcutaneous tissue, Personal history of disease of skin and subcutaneous tissue, Personal history of diseases of the skin and subcutaneous tissue (11/03/2014), Personal history of diseases of the skin and subcutaneous tissue (06/01/2018), Personal history of other diseases of the musculoskeletal system and connective tissue (01/02/2015), Personal history of other diseases of the respiratory system (05/29/2014), Personal history of other mental and behavioral disorders, Personal history of other specified  conditions (10/05/2018), S/p tibial fracture, Tonsillitis, Type 2 diabetes mellitus with hyperglycemia, with long-term current use of insulin, and Unspecified open wound, left lower leg, initial encounter (12/10/2018).    Surgical History  She has a past surgical history that includes Ankle surgery; Colonoscopy; IR injection nerve block; Umbilical hernia repair; Other surgical history (01/31/2016); Colonoscopy (07/12/2013); Umbilical hernia repair (07/12/2013); Colonoscopy (07/12/2013); Ankle surgery (07/12/2013); and Shoulder arthroscopy (03/07/2025).     Social History     Occupational History    Not on file   Tobacco Use    Smoking status: Never     Passive exposure: Never    Smokeless tobacco: Never   Vaping Use    Vaping status: Never Used   Substance and Sexual Activity    Alcohol use: Never     Comment: occasional    Drug use: Never    Sexual activity: Defer     Family History  Family History[1], no immunodeficiency  Allergies  Ozempic [semaglutide], Janumet [sitagliptin phos-metformin], Toujeo max u-300 solostar [insulin glargine u-300 conc], Dulaglutide, Metformin, Penicillins, Pioglitazone hcl, and Venlafaxine     Immunization History   Administered Date(s) Administered    Flu vaccine (IIV4), preservative free *Check age/dose* 11/08/2018, 10/09/2020, 10/13/2023    Flu vaccine, trivalent, preservative free, age 6 months and greater (Fluarix/Fluzone/Flulaval) 10/24/2013, 11/15/2014, 09/24/2015    Influenza Whole 10/26/2019    Influenza, seasonal, injectable 10/10/2016, 09/03/2021, 10/03/2022     Medications  Home medications:  Prescriptions Prior to Admission[2]  Current medications:  Scheduled medications  Scheduled Medications[3]  Continuous medications  Continuous Medications[4]  PRN medications  PRN Medications[5]    Review of Systems   All other systems reviewed and are negative.       Objective  Range of Vitals (last 24 hours)  Heart Rate:  [65-91]   Temp:  [35.9 °C (96.6 °F)-37.1 °C (98.8 °F)]   Resp:  " [16-19]   BP: (130-185)/(53-79)   Height:  [157.5 cm (5' 2\")]   Weight:  [123 kg (270 lb 6.4 oz)]   SpO2:  [92 %-97 %]   Daily Weight  08/01/25 : 123 kg (270 lb 6.4 oz)    Body mass index is 49.46 kg/m².     Physical Exam  Constitutional:       Appearance: Normal appearance.   HENT:      Head: Normocephalic and atraumatic.      Mouth/Throat:      Mouth: Mucous membranes are moist.      Pharynx: Oropharynx is clear.     Eyes:      Pupils: Pupils are equal, round, and reactive to light.       Cardiovascular:      Rate and Rhythm: Normal rate and regular rhythm.      Heart sounds: Normal heart sounds.   Pulmonary:      Effort: Pulmonary effort is normal.      Breath sounds: Normal breath sounds.   Abdominal:      General: Abdomen is flat. Bowel sounds are normal.      Palpations: Abdomen is soft.     Musculoskeletal:         General: Swelling present.      Cervical back: Normal range of motion.      Comments: Lt shin round wound, superficial, velvety border, tender surrounding, some changes in the Rt shin area with early blisters     Neurological:      Mental Status: She is alert.          Relevant Results  Outside Hospital Results  reviewed  Labs  Results from last 72 hours   Lab Units 08/02/25  0531 08/01/25  0745   WBC AUTO x10*3/uL 7.7 6.7   HEMOGLOBIN g/dL 11.6* 13.0   HEMATOCRIT % 37.6 39.9   PLATELETS AUTO x10*3/uL 223 227   NEUTROS PCT AUTO %  --  67.9   LYMPHS PCT AUTO %  --  18.2   MONOS PCT AUTO %  --  8.9   EOS PCT AUTO %  --  3.7     Results from last 72 hours   Lab Units 08/02/25  0531 08/01/25  0745   SODIUM mmol/L 134* 139   POTASSIUM mmol/L 4.6 4.0   CHLORIDE mmol/L 102 106   CO2 mmol/L 28 26   BUN mg/dL 27* 27*   CREATININE mg/dL 1.75* 1.15*   GLUCOSE mg/dL 253* 116*   CALCIUM mg/dL 8.7 9.0   ANION GAP mmol/L 9* 11   EGFR mL/min/1.73m*2 33* 55*   PHOSPHORUS mg/dL 3.3  --      Results from last 72 hours   Lab Units 08/02/25  0531   ALBUMIN g/dL 3.4     Estimated Creatinine Clearance: 42.8 mL/min (A) " "(by C-G formula based on SCr of 1.75 mg/dL (H)).  C-Reactive Protein   Date Value Ref Range Status   08/01/2025 0.98 <1.00 mg/dL Final     CRP   Date Value Ref Range Status   04/30/2018 1.60 (A) mg/dL Final     Comment:     REF VALUE  < 1.00       Sedimentation Rate   Date Value Ref Range Status   08/01/2025 26 0 - 30 mm/h Final   04/30/2018 82 (H) 0 - 30 mm/h Final     No results found for: \"HIV1X2\", \"HIVCONF\", \"XFIIMV0EF\"  No results found for: \"HEPCABINIT\", \"HEPCAB\", \"HCVPCRQUANT\"  Microbiology  Reviewed  Imaging  Reviewed      Assessment/Plan   Left shin none healing ulcer / infection, the wound could be a combination of stasis and NLD  Legs stasis    Recommendations :  Cefazolin  Keep the leg elevated  Wound care  If she does not heal could try sterods      I spent minutes in the professional and overall care of this patient.          Samra Beltran MD       [1]   Family History  Problem Relation Name Age of Onset    Hypertension Mother      Diabetes type II Mother      Heart disease Mother      Coronary artery disease Father      Malig Hypertension Father      Diabetes type II Father      Uterine cancer Paternal Grandmother     [2]   Medications Prior to Admission   Medication Sig Dispense Refill Last Dose/Taking    acetaminophen (Tylenol) 500 mg tablet Take 1 tablet (500 mg) by mouth. EVERY 4 TO 6 HOURS AS NEEDED.   8/1/2025 at  4:00 AM    albuterol (Ventolin HFA) 90 mcg/actuation inhaler Inhale 2 puffs every 4 hours if needed for wheezing or shortness of breath. 8 g 5 Unknown    aspirin 81 mg EC tablet Take 1 tablet (81 mg) by mouth once daily.   7/31/2025 Evening    hydrOXYzine pamoate (Vistaril) 25 mg capsule Take 1 capsule (25 mg) by mouth 2 times a day as needed.   7/31/2025    insulin glargine (Lantus Solostar U-100 Insulin) 100 unit/mL (3 mL) pen INJECT UP TO 80 UNITS UNDER THE SKIN ONE TIME DAILY AS DIRECTED 75 mL 3 7/31/2025 Morning    insulin lispro (HumaLOG KwikPen Insulin) 100 unit/mL pen INJECT " SUBCUTANEOUSLY UP  UNITS ONE TIME DAILY 90 mL 3 8/1/2025 at  6:00 AM    lamoTRIgine (LaMICtal) 150 mg tablet Take 1 tablet (150 mg) by mouth 2 times a day.   7/31/2025 Evening    lisinopril 10 mg tablet TAKE ONE TABLET BY MOUTH ONCE A  DAY 90 tablet 3 7/31/2025 Morning    pramipexole (Mirapex) 0.5 mg tablet TAKE 1 TABLET BY MOUTH  IN THE MORNING  as needed and  2 before bedtime 220 tablet 3 7/31/2025 Evening    QUEtiapine (SEROquel) 200 mg tablet Take 2 tablets (400 mg) by mouth once daily at bedtime.   7/31/2025 Evening    rosuvastatin (Crestor) 20 mg tablet Take 1 tablet (20 mg) by mouth once daily. 90 tablet 1 7/31/2025 Evening    sulfaSALAzine (Azulfidine) 500 mg tablet Take 1 tablet (500 mg) by mouth 4 times a day. (Patient taking differently: Take 1 tablet (500 mg) by mouth 3 times a day.) 240 tablet 5 7/31/2025 Noon    tiZANidine (Zanaflex) 4 mg tablet TAKE ONE TABLET BY MOUTH AT BEDTIME AS NEEDED FOR MUSCLE SPAMS 90 tablet 3 7/31/2025 Evening    blood-glucose sensor (FreeStyle Erika 3 Sensor) device 1 each every 14 (fourteen) days. 2 each 5     EPINEPHrine 0.3 mg/0.3 mL injection syringe Inject 0.3 mL (0.3 mg) into the muscle 1 time if needed for anaphylaxis for up to 6 doses. Use as directed for anaphylaxis reaction 2 each 2     flash glucose sensor kit (FreeStyle Erika 14 Day Sensor) kit use as directed CHANGE EVERY 14 DAYS 2 each 5     fluticasone (Flonase) 50 mcg/actuation nasal spray Administer 2 sprays into each nostril once daily.   Unknown    FreeStyle Erika 14 Day Sensor kit Use as instructed change every 14 days 2 each 11     FreeStyle Erika 3 [place] misc Use as instructed 1 each 0     furosemide (Lasix) 40 mg tablet Take 1 tablet (40 mg) by mouth once daily as needed (edema). 90 tablet 3 7/30/2025    multivit-min/iron/folic acid/K (ADULTS MULTIVITAMIN ORAL) Take by mouth. As directed   7/30/2025    nebivolol (Bystolic) 10 mg tablet Take 0.5 tablets (5 mg) by mouth once daily.   7/29/2025     "pen needle, diabetic (BD Griselda 2nd Gen Pen Needle) 32 gauge x 5/32\" needle Use 1 Pen needle 2 times a day. 200 each 3    [3] aspirin, 81 mg, oral, Daily  enoxaparin, 40 mg, subcutaneous, q12h STEVEN  fluticasone, 2 spray, Each Nostril, Daily  furosemide, 20 mg, intravenous, Once  insulin glargine, 80 Units, subcutaneous, Daily  insulin lispro, 0-20 Units, subcutaneous, TID AC  lamoTRIgine, 150 mg, oral, BID  lisinopril, 10 mg, oral, Daily  metoprolol succinate XL, 50 mg, oral, Daily  pantoprazole, 40 mg, oral, Daily before breakfast  polyethylene glycol, 17 g, oral, Daily  QUEtiapine, 400 mg, oral, Nightly  rosuvastatin, 20 mg, oral, Daily  sulfaSALAzine, 500 mg, oral, TID    [4]    [5] PRN medications: acetaminophen, albuterol, dextrose, dextrose, [Held by provider] furosemide, glucagon, glucagon, HYDROmorphone, HYDROmorphone, HYDROmorphone, hydrOXYzine HCL, ondansetron, pramipexole, pramipexole, prochlorperazine, tiZANidine    "

## 2025-08-02 NOTE — HOSPITAL COURSE
Yessy Ortega is a 59 y.o. female w/ Hx of HTN, T2DM, hypercholesterolemia, lymphedema, bipolar disorder, cellulitis, MDD, arthritis, umbilical hernia s/p repair, JULIO C, diabetic retinopathy, morbid obesity, restless leg syndrome, gout, lumbar radiculopathy, ulcerative colitis who was admitted on 8/1/25 for acutely worsening LLE wound/pain w/ unclear etiology. Vascular US PVR w/o exercise was unremarkable and CT Left Tib/Fib show soft tissue swelling and skin thickening which could have a component of cellulitis and few scattered tiny calcifications could suggest a component of chronic venous stasis. Tissue/wound cx show rare PMN leukocytes, moderate gram + cocci, and rare yeast. Low suspicion for infection as patient has no systemic signs of infection and WBC, ESR, CRP are wnl. However, patient is having worsening pain and unsure what is causing this in which ID was consulted and recommended starting cefazolin. Gen surg/wound care consulted.

## 2025-08-02 NOTE — PROGRESS NOTES
Internal Medicine - Daily Progress Note   Hospital Day: 2       Name:Yessy Ortega, AGE: 59 y.o., GENDER: female, MRN: [id number], ROOM: 232/232-A   CODE STATUS: Full Code  Attending Physician: Nas Sequeira DO  Resident: Gunjan Owens DO        Chief Complaint     Chief Complaint   Patient presents with    Leg Pain      Subjective    Yessy Ortega is a 59 y.o. year old female patient on Hospital Day: 2    Overnight events:None    Patient seen and examined at bedside. Reports worsening pain 8/10 in LLE this morning in which she received RPN pain med. Denies fever, chills, SOB. States that she has been eating/drinking but yesterday was nauseated and vomited in which she received Zofran w/ benefit. States that she doesn't have any more nausea or vomiting since and will try to eat/drink more.    Meds    Scheduled medications  Scheduled Medications[1]  Continuous medications  Continuous Medications[2]  PRN medications  PRN Medications[3]     Objective    Physical Exam  Vitals reviewed.   Constitutional:       General: She is not in acute distress.     Appearance: She is obese. She is not ill-appearing.   HENT:      Head: Normocephalic and atraumatic.     Eyes:      General: No scleral icterus.     Extraocular Movements: Extraocular movements intact.       Cardiovascular:      Rate and Rhythm: Normal rate and regular rhythm.      Heart sounds: No murmur heard.  Pulmonary:      Effort: Pulmonary effort is normal. No respiratory distress.      Breath sounds: Normal breath sounds.   Abdominal:      Palpations: Abdomen is soft.      Tenderness: There is no abdominal tenderness.     Musculoskeletal:      Right lower leg: Edema present.      Left lower leg: Edema present.     Skin:     General: Skin is warm and dry.      Comments: LLE wound has well-demarcated erythematous border w/ no purulent drainage; LLE wrapped in gauze/bandages and elevated.     Neurological:      Mental Status: She is alert and oriented to  "person, place, and time.     Psychiatric:         Mood and Affect: Mood normal.         Behavior: Behavior normal.        Visit Vitals  /53 (BP Location: Right arm, Patient Position: Lying)   Pulse 77   Temp 37.1 °C (98.8 °F) (Temporal)   Resp 19   Ht 1.575 m (5' 2\")   Wt 123 kg (270 lb 6.4 oz)   SpO2 93%   BMI 49.46 kg/m²   Smoking Status Never   BSA 2.32 m²        Intake/Output Summary (Last 24 hours) at 8/2/2025 1237  Last data filed at 8/2/2025 1209  Gross per 24 hour   Intake 480 ml   Output 500 ml   Net -20 ml     Labs:   Results from last 72 hours   Lab Units 08/02/25  0531 08/01/25  0745   SODIUM mmol/L 134* 139   POTASSIUM mmol/L 4.6 4.0   CHLORIDE mmol/L 102 106   CO2 mmol/L 28 26   BUN mg/dL 27* 27*   CREATININE mg/dL 1.75* 1.15*   GLUCOSE mg/dL 253* 116*   CALCIUM mg/dL 8.7 9.0   ANION GAP mmol/L 9* 11   EGFR mL/min/1.73m*2 33* 55*   PHOSPHORUS mg/dL 3.3  --       Results from last 72 hours   Lab Units 08/02/25  0531 08/01/25  0745   WBC AUTO x10*3/uL 7.7 6.7   HEMOGLOBIN g/dL 11.6* 13.0   HEMATOCRIT % 37.6 39.9   PLATELETS AUTO x10*3/uL 223 227   NEUTROS PCT AUTO %  --  67.9   LYMPHS PCT AUTO %  --  18.2   MONOS PCT AUTO %  --  8.9   EOS PCT AUTO %  --  3.7      Lab Results   Component Value Date    CALCIUM 8.7 08/02/2025    PHOS 3.3 08/02/2025      Lab Results   Component Value Date    CRP 0.98 08/01/2025      Micro/ID:   No results found for the last 90 days.     No results found for: \"URINECULTURE\", \"BLOODCULT\", \"CSFCULTSMEAR\"    Images    Imaging  CT tibia fibula left wo IV contrast  Result Date: 8/2/2025  Generalized soft tissue swelling and skin thickening of the entirety of the visualized left lower leg. Findings could have a component of cellulitis. There are few scattered tiny calcifications in the soft tissues mostly medial which could suggest a component of chronic venous stasis.     MACRO: None   Signed by: García Rodarte 8/2/2025 6:18 AM Dictation workstation:   [id number]    XR tibia " fibula left 2 views  Result Date: 8/1/2025  Subcutaneous soft tissue edema without acute fracture or dislocation.   MACRO: None   Signed by: Shaji Martinez 8/1/2025 12:27 PM Dictation workstation:   [id number]    Cardiology, Vascular, and Other Imaging  No other imaging results found for the past 7 days    Assessment and Plan    Yessy Ortega is a 59 y.o. female w/ Hx of HTN, T2DM, hypercholesterolemia, lymphedema, bipolar disorder, cellulitis, MDD, arthritis, umbilical hernia s/p repair, JULIO C, diabetic retinopathy, morbid obesity, restless leg syndrome, gout, lumbar radiculopathy, ulcerative colitis who was admitted on 8/1/25 for acutely worsening left lower leg wound/pain w/ unclear etiology.    Acute Medical Issue:  # Left lower leg wound/pain w/ unclear etiology   :: DDx include: local trauma vs pyoderma gangrenosum vs low suspicion for cellulitis   :: Low suspicion for infection as patient has no systemic signs of infection and WBC, ESR, CRP are wnl however, patient is having worsening pain and unsure what is causing this  :: Vascular US PVR w/o exercise unremarkable  :: CT Left Tib/Fib show soft tissue swelling and skin thickening which could have a component of cellulitis and few scattered tiny calcifications could suggest a component of chronic venous stasis  :: Appreciate gen surg/ID recommendations  :: Patient has penicillin allergy but states she had a rash when she was a teenager, denies problems w/ breathing. She has been on cephalexin before.  Plan:  - Give 1 dose of IV Lasix 20 mg to improve swelling/pain  - Start IV cefazolin 1 g q8h (day 1);may need to give steroid if wound is not healing  - Keep leg elevated  - Follow wound cx   - Wound care consulted already  - PRN pain meds    # MEDINA  :: Cr increased from 1.15 to 1.75 (bl ~ 0.9-1.0)  :: Likely d/t dehydration as patient was vomiting yesterday which appears to be associated w/ receiving morphine/Dilaudid. Nausea/vomiting has resolved.  Plan:  -  Encourage PO intake  - Check urine electrolytes and urea nitrogen  - Continue monitoring RFP    Chronic Medical Issues:  # T2DM - Lispro injections 0-20 units before meals, Lantus 80 units daily  # Bipolar disorder - lamotrigine 150 mg BID, Seroquel 400 mg nightly  # HTN- lisinopril 10 mg daily, metoprolol 50 mg daily  #Chronic rhinitis - fluticasone 2 sprays each nostril  #Hypercholesterolemia- rosuvastatin 20 mg daily   #Ulcerative Colitis - sulfasalazine 500 mg TID      Fluids: PO intake  Electrolytes: Replete PRN  Nutrition: Consistent carb diet  Antimicrobials: None  DVT ppx: Lovenox  Catheter: None  Lines: PIV  Supplemental Oxygen: None  Emergency Contact: Extended Emergency Contact Information  Primary Emergency Contact: Maryana Ortega  Address: [id number] [id number] [facility]  Home Phone: [phone]  Work Phone: [phone]  Relation: Spouse  Secondary Emergency Contact: Khushboo Garza  Home Phone: [phone]  Relation: Daughter   Code: Full Code     Disposition: Inpatient for further evaluation/treatment of LLE wound/pain. Discharge to home pending clinical improvement.    Gunjan Owens DO  PGY-1  08/02/25 at 12:37 PM                 [1] aspirin, 81 mg, oral, Daily  ceFAZolin, 1 g, intravenous, q8h  enoxaparin, 40 mg, subcutaneous, q12h STEVEN  fluticasone, 2 spray, Each Nostril, Daily  insulin glargine, 80 Units, subcutaneous, Daily  insulin lispro, 0-20 Units, subcutaneous, TID AC  lamoTRIgine, 150 mg, oral, BID  lisinopril, 10 mg, oral, Daily  metoprolol succinate XL, 50 mg, oral, Daily  pantoprazole, 40 mg, oral, Daily before breakfast  polyethylene glycol, 17 g, oral, Daily  QUEtiapine, 400 mg, oral, Nightly  rosuvastatin, 20 mg, oral, Daily  sulfaSALAzine, 500 mg, oral, TID     [2]    [3] PRN medications: acetaminophen, albuterol, dextrose, dextrose, [Held by provider] furosemide, glucagon, glucagon, HYDROmorphone, HYDROmorphone, HYDROmorphone, hydrOXYzine HCL,  ondansetron, pramipexole, pramipexole, prochlorperazine, tiZANidine

## 2025-08-03 VITALS
OXYGEN SATURATION: 92 % | WEIGHT: 268.74 LBS | RESPIRATION RATE: 18 BRPM | SYSTOLIC BLOOD PRESSURE: 117 MMHG | HEIGHT: 62 IN | TEMPERATURE: 98.4 F | BODY MASS INDEX: 49.45 KG/M2 | HEART RATE: 70 BPM | DIASTOLIC BLOOD PRESSURE: 52 MMHG

## 2025-08-03 PROBLEM — L24.9 IRRITANT CONTACT DERMATITIS: Status: ACTIVE | Noted: 2025-08-03

## 2025-08-03 LAB
ALBUMIN SERPL BCP-MCNC: 3.3 G/DL (ref 3.4–5)
ANION GAP SERPL CALC-SCNC: 11 MMOL/L (ref 10–20)
BACTERIA SPEC CULT: ABNORMAL
BUN SERPL-MCNC: 27 MG/DL (ref 6–23)
CALCIUM SERPL-MCNC: 8.3 MG/DL (ref 8.6–10.3)
CHLORIDE SERPL-SCNC: 101 MMOL/L (ref 98–107)
CO2 SERPL-SCNC: 26 MMOL/L (ref 21–32)
CREAT SERPL-MCNC: 1.28 MG/DL (ref 0.5–1.05)
EGFRCR SERPLBLD CKD-EPI 2021: 48 ML/MIN/1.73M*2
ERYTHROCYTE [DISTWIDTH] IN BLOOD BY AUTOMATED COUNT: 12.5 % (ref 11.5–14.5)
GLUCOSE SERPL-MCNC: 195 MG/DL (ref 74–99)
GRAM STN SPEC: ABNORMAL
HCT VFR BLD AUTO: 36 % (ref 36–46)
HGB BLD-MCNC: 11.6 G/DL (ref 12–16)
LABORATORY COMMENT REPORT: ABNORMAL
MAGNESIUM SERPL-MCNC: 1.71 MG/DL (ref 1.6–2.4)
MCH RBC QN AUTO: 28.7 PG (ref 26–34)
MCHC RBC AUTO-ENTMCNC: 32.2 G/DL (ref 32–36)
MCV RBC AUTO: 89 FL (ref 80–100)
NRBC BLD-RTO: 0 /100 WBCS (ref 0–0)
PHOSPHATE SERPL-MCNC: 2.7 MG/DL (ref 2.5–4.9)
PLATELET # BLD AUTO: 187 X10*3/UL (ref 150–450)
POTASSIUM SERPL-SCNC: 4 MMOL/L (ref 3.5–5.3)
RBC # BLD AUTO: 4.04 X10*6/UL (ref 4–5.2)
SODIUM SERPL-SCNC: 134 MMOL/L (ref 136–145)
WBC # BLD AUTO: 6.3 X10*3/UL (ref 4.4–11.3)

## 2025-08-03 PROCEDURE — 99232 SBSQ HOSP IP/OBS MODERATE 35: CPT

## 2025-08-03 PROCEDURE — 2500000004 HC RX 250 GENERAL PHARMACY W/ HCPCS (ALT 636 FOR OP/ED)

## 2025-08-03 PROCEDURE — 99231 SBSQ HOSP IP/OBS SF/LOW 25: CPT | Performed by: SURGERY

## 2025-08-03 PROCEDURE — 1100000001 HC PRIVATE ROOM DAILY

## 2025-08-03 PROCEDURE — 80069 RENAL FUNCTION PANEL: CPT

## 2025-08-03 PROCEDURE — 85027 COMPLETE CBC AUTOMATED: CPT

## 2025-08-03 PROCEDURE — 2500000002 HC RX 250 W HCPCS SELF ADMINISTERED DRUGS (ALT 637 FOR MEDICARE OP, ALT 636 FOR OP/ED)

## 2025-08-03 PROCEDURE — 36415 COLL VENOUS BLD VENIPUNCTURE: CPT

## 2025-08-03 PROCEDURE — 83735 ASSAY OF MAGNESIUM: CPT

## 2025-08-03 PROCEDURE — 2500000001 HC RX 250 WO HCPCS SELF ADMINISTERED DRUGS (ALT 637 FOR MEDICARE OP)

## 2025-08-03 RX ADMIN — ROSUVASTATIN CALCIUM 20 MG: 20 TABLET, FILM COATED ORAL at 08:57

## 2025-08-03 RX ADMIN — CEFAZOLIN SODIUM 1 G: 1 INJECTION, SOLUTION INTRAVENOUS at 05:01

## 2025-08-03 RX ADMIN — LAMOTRIGINE 150 MG: 100 TABLET ORAL at 08:57

## 2025-08-03 RX ADMIN — SULFASALAZINE 500 MG: 500 TABLET ORAL at 08:57

## 2025-08-03 RX ADMIN — SULFASALAZINE 500 MG: 500 TABLET ORAL at 15:46

## 2025-08-03 RX ADMIN — INSULIN LISPRO 8 UNITS: 100 INJECTION, SOLUTION INTRAVENOUS; SUBCUTANEOUS at 07:11

## 2025-08-03 RX ADMIN — HYDROMORPHONE HYDROCHLORIDE 0.6 MG: 1 INJECTION, SOLUTION INTRAMUSCULAR; INTRAVENOUS; SUBCUTANEOUS at 03:22

## 2025-08-03 RX ADMIN — ENOXAPARIN SODIUM 40 MG: 100 INJECTION SUBCUTANEOUS at 21:01

## 2025-08-03 RX ADMIN — LISINOPRIL 10 MG: 10 TABLET ORAL at 08:57

## 2025-08-03 RX ADMIN — METOPROLOL SUCCINATE 50 MG: 50 TABLET, EXTENDED RELEASE ORAL at 08:57

## 2025-08-03 RX ADMIN — CEFAZOLIN SODIUM 1 G: 1 INJECTION, SOLUTION INTRAVENOUS at 21:02

## 2025-08-03 RX ADMIN — PANTOPRAZOLE SODIUM 40 MG: 40 TABLET, DELAYED RELEASE ORAL at 06:07

## 2025-08-03 RX ADMIN — INSULIN LISPRO 8 UNITS: 100 INJECTION, SOLUTION INTRAVENOUS; SUBCUTANEOUS at 11:10

## 2025-08-03 RX ADMIN — HYDROMORPHONE HYDROCHLORIDE 0.6 MG: 1 INJECTION, SOLUTION INTRAMUSCULAR; INTRAVENOUS; SUBCUTANEOUS at 12:57

## 2025-08-03 RX ADMIN — QUETIAPINE FUMARATE 400 MG: 100 TABLET ORAL at 21:02

## 2025-08-03 RX ADMIN — ASPIRIN 81 MG: 81 TABLET, DELAYED RELEASE ORAL at 08:57

## 2025-08-03 RX ADMIN — ONDANSETRON 4 MG: 2 INJECTION, SOLUTION INTRAMUSCULAR; INTRAVENOUS at 07:11

## 2025-08-03 RX ADMIN — ENOXAPARIN SODIUM 40 MG: 100 INJECTION SUBCUTANEOUS at 08:57

## 2025-08-03 RX ADMIN — PRAMIPEXOLE DIHYDROCHLORIDE 1 MG: 1 TABLET ORAL at 21:49

## 2025-08-03 RX ADMIN — POLYETHYLENE GLYCOL 3350 17 G: 17 POWDER, FOR SOLUTION ORAL at 08:59

## 2025-08-03 RX ADMIN — HYDROMORPHONE HYDROCHLORIDE 0.6 MG: 1 INJECTION, SOLUTION INTRAMUSCULAR; INTRAVENOUS; SUBCUTANEOUS at 21:00

## 2025-08-03 RX ADMIN — HYDROMORPHONE HYDROCHLORIDE 0.6 MG: 1 INJECTION, SOLUTION INTRAMUSCULAR; INTRAVENOUS; SUBCUTANEOUS at 08:55

## 2025-08-03 RX ADMIN — SULFASALAZINE 500 MG: 500 TABLET ORAL at 21:02

## 2025-08-03 RX ADMIN — LAMOTRIGINE 150 MG: 100 TABLET ORAL at 21:01

## 2025-08-03 RX ADMIN — HYDROMORPHONE HYDROCHLORIDE 0.6 MG: 1 INJECTION, SOLUTION INTRAMUSCULAR; INTRAVENOUS; SUBCUTANEOUS at 17:16

## 2025-08-03 RX ADMIN — INSULIN GLARGINE 80 UNITS: 100 INJECTION, SOLUTION SUBCUTANEOUS at 08:56

## 2025-08-03 RX ADMIN — CEFAZOLIN SODIUM 1 G: 1 INJECTION, SOLUTION INTRAVENOUS at 12:57

## 2025-08-03 ASSESSMENT — COGNITIVE AND FUNCTIONAL STATUS - GENERAL
DAILY ACTIVITIY SCORE: 23
CLIMB 3 TO 5 STEPS WITH RAILING: A LITTLE
DRESSING REGULAR LOWER BODY CLOTHING: A LITTLE
MOBILITY SCORE: 23

## 2025-08-03 ASSESSMENT — PAIN SCALES - GENERAL
PAINLEVEL_OUTOF10: 7
PAINLEVEL_OUTOF10: 8
PAINLEVEL_OUTOF10: 7

## 2025-08-03 ASSESSMENT — PAIN DESCRIPTION - LOCATION
LOCATION: LEG

## 2025-08-03 ASSESSMENT — PAIN DESCRIPTION - ORIENTATION
ORIENTATION: LEFT

## 2025-08-03 ASSESSMENT — PAIN - FUNCTIONAL ASSESSMENT
PAIN_FUNCTIONAL_ASSESSMENT: 0-10

## 2025-08-03 NOTE — PROGRESS NOTES
"Yessy Ortega is a 59 y.o. female on day 1 of admission presenting with Injury of lower extremity.    Subjective   The patient was sound asleep upon my arrival to the room   She is complaining of pain still in the leg.  Particularly posteriorly.    Objective afebrile vital signs are stable    Physical Exam examination of the left lower extremity reveals that the edema is markedly improved.  She is still of course morbidly obese of the leg is large making it difficult to assess but the skin is now wrinkled in places signifying that the edema has improved.  The wound itself is beginning to epithelialize and should be treatable with Xeroform.    Last Recorded Vitals  Blood pressure 132/52, pulse 101, temperature 36.7 °C (98.1 °F), temperature source Temporal, resp. rate 17, height 1.575 m (5' 2\"), weight 122 kg (268 lb 11.9 oz), SpO2 94%.  Intake/Output last 3 Shifts:  I/O last 3 completed shifts:  In: 1300 (10.7 mL/kg) [P.O.:1300]  Out: 500 (4.1 mL/kg) [Urine:500 (0.1 mL/kg/hr)]  Weight: 121.9 kg     Relevant Results                            Assessment & Plan  Injury of lower extremity    Leg wound, left, subsequent encounter    This patient has lymphedema secondary to obesity.  She has lymphedema pumps at home and have been using them for an hour.  I suggested to her that she increase that to 2 hours daily.  She also will need to get in the habit of putting some sort of compression stocking on in the morning.  It would be helpful I think for her to receive education in our outpatient lymphedema clinic.  She also is welcome to follow-up in the wound care clinic.    The leg elevation alone however in the hospital has greatly improved her legs.  Could consider an ultrasound to rule out DVT while she is in the hospital since she now has some posterior calf pain.  I suspect the pain is just because of dependent edema.      I spent 10 minutes in the professional and overall care of this patient.      Julia Mosley, " MD

## 2025-08-03 NOTE — PROGRESS NOTES
Yessy Ortega is a 59 y.o. female on day 1 of admission presenting with Injury of lower extremity.    Subjective   Interval History: no fever, no new complaints        Review of Systems    Objective   Range of Vitals (last 24 hours)  Heart Rate:  []   Temp:  [36.5 °C (97.7 °F)-37 °C (98.6 °F)]   Resp:  [16-18]   BP: (115-146)/(45-60)   Weight:  [122 kg (268 lb 11.9 oz)]   SpO2:  [88 %-95 %]   Daily Weight  08/03/25 : 122 kg (268 lb 11.9 oz)    Body mass index is 49.15 kg/m².    Physical Exam  Constitutional:       Appearance: Normal appearance.   HENT:      Head: Normocephalic and atraumatic.      Mouth/Throat:      Mouth: Mucous membranes are moist.      Pharynx: Oropharynx is clear.     Eyes:      Pupils: Pupils are equal, round, and reactive to light.       Cardiovascular:      Rate and Rhythm: Normal rate and regular rhythm.      Heart sounds: Normal heart sounds.   Pulmonary:      Effort: Pulmonary effort is normal.      Breath sounds: Normal breath sounds.   Abdominal:      General: Abdomen is flat. Bowel sounds are normal.      Palpations: Abdomen is soft.     Musculoskeletal:         General: Swelling present.      Cervical back: Normal range of motion.      Comments: Lt shin wound, less tenderness, less edema     Neurological:      Mental Status: She is alert.         Antibiotics  ceFAZolin - 1 gram/50 mL    Relevant Results  Labs  Results from last 72 hours   Lab Units 08/03/25  0618 08/02/25  0531 08/01/25  0745   WBC AUTO x10*3/uL 6.3 7.7 6.7   HEMOGLOBIN g/dL 11.6* 11.6* 13.0   HEMATOCRIT % 36.0 37.6 39.9   PLATELETS AUTO x10*3/uL 187 223 227   NEUTROS PCT AUTO %  --   --  67.9   LYMPHS PCT AUTO %  --   --  18.2   MONOS PCT AUTO %  --   --  8.9   EOS PCT AUTO %  --   --  3.7     Results from last 72 hours   Lab Units 08/03/25  0618 08/02/25  0531 08/01/25  0745   SODIUM mmol/L 134* 134* 139   POTASSIUM mmol/L 4.0 4.6 4.0   CHLORIDE mmol/L 101 102 106   CO2 mmol/L 26 28 26   BUN mg/dL 27* 27*  27*   CREATININE mg/dL 1.28* 1.75* 1.15*   GLUCOSE mg/dL 195* 253* 116*   CALCIUM mg/dL 8.3* 8.7 9.0   ANION GAP mmol/L 11 9* 11   EGFR mL/min/1.73m*2 48* 33* 55*   PHOSPHORUS mg/dL 2.7 3.3  --      Results from last 72 hours   Lab Units 08/03/25  0618 08/02/25  0531   ALBUMIN g/dL 3.3* 3.4     Estimated Creatinine Clearance: 58.2 mL/min (A) (by C-G formula based on SCr of 1.28 mg/dL (H)).  C-Reactive Protein   Date Value Ref Range Status   08/01/2025 0.98 <1.00 mg/dL Final     CRP   Date Value Ref Range Status   04/30/2018 1.60 (A) mg/dL Final     Comment:     REF VALUE  < 1.00       Microbiology  Susceptibility data from last 14 days.  Collected Specimen Info Organism   08/01/25 Tissue/Biopsy from Wound/Tissue Mixed Gram-Positive and Gram-Negative Bacteria   Imaging  Reviewed        Assessment/Plan   Left shin none healing ulcer / infection, the wound could be a combination of stasis and NLD  Legs stasis     Recommendations :  Continue Cefazolin  Discussed with the medical team     I spent minutes in the professional and overall care of this patient.          Samra Beltran MD

## 2025-08-03 NOTE — CARE PLAN
Problem: Pain - Adult  Goal: Verbalizes/displays adequate comfort level or baseline comfort level  Outcome: Progressing     Problem: Safety - Adult  Goal: Free from fall injury  Outcome: Progressing     Problem: Nutrition  Goal: Nutrient intake appropriate for maintaining nutritional needs  Outcome: Progressing   The patient's goals for the shift include  pain contol    The clinical goals for the shift include pt will have controlled pain during shift

## 2025-08-03 NOTE — PROGRESS NOTES
Yessy Ortega is a 59 y.o. female on day 1 of admission presenting with Injury of lower extremity.      Subjective   No acute events overnight. Patient states that her pain has slightly improved since admission and since starting on the antibiotic. She states that it is still somewhat painful and burning however it is steadily improving. Nausea also improved however did request a medication for it earlier this morning.    Objective     Last Recorded Vitals  /52 (BP Location: Right arm)   Pulse 101   Temp 36.7 °C (98.1 °F) (Temporal)   Resp 17   Wt 122 kg (268 lb 11.9 oz)   SpO2 94%   Intake/Output last 3 Shifts:    Intake/Output Summary (Last 24 hours) at 8/3/2025 1125  Last data filed at 8/3/2025 0834  Gross per 24 hour   Intake 1060 ml   Output 500 ml   Net 560 ml       Admission Weight  Weight: 118 kg (260 lb) (08/01/25 0654)    Daily Weight  08/03/25 : 122 kg (268 lb 11.9 oz)    Image Results  CT tibia fibula left wo IV contrast  Narrative: Interpreted By:  García Rodarte,   STUDY:  CT TIBIA FIBULA LEFT WO IV CONTRAST; ;  8/1/2025 4:51 pm      INDICATION:  Signs/Symptoms:Left lower extremity wound of unclear etiology.          Signs/Symptoms:Left lower extremity wound of unclear etiology.      COMPARISON:  Plain film radiographs left lower leg earlier the same day      ACCESSION NUMBER(S):  [id number]      ORDERING CLINICIAN:  GUERITA ANGULO      TECHNIQUE:  Multiple thin-section axial images left lower leg without contrast.      FINDINGS:  There is generalized nonspecific subcutaneous edema seen involving  the entirety of the visualized left lower extremity extending from  the knee into the foot.      There are few scattered small calcifications mostly in the medial  soft tissues. This could suggest a component of chronic venous  stasis. However a component of cellulitis is also given consideration.      Significant skin thickening seen.      No discrete focal fluid collection identified.      No  deep collections.      No muscular signal abnormality.      No findings suggestive of tendon tear.      No evidence of tunnel soft tissue gas.      Moderate osteoarthritis of the left knee with meniscal ossicle.      Mild degenerative changes of the left ankle and hindfoot.          Impression: Generalized soft tissue swelling and skin thickening of the entirety  of the visualized left lower leg. Findings could have a component of  cellulitis. There are few scattered tiny calcifications in the soft  tissues mostly medial which could suggest a component of chronic  venous stasis.          MACRO:  None      Signed by: García Rodarte 8/2/2025 6:18 AM  Dictation workstation:   [id number]      Physical Exam  Vitals reviewed.   Constitutional:       General: She is not in acute distress.     Appearance: She is not ill-appearing.     Cardiovascular:      Rate and Rhythm: Normal rate and regular rhythm.      Heart sounds: Normal heart sounds. No murmur heard.  Pulmonary:      Effort: No respiratory distress.      Breath sounds: Normal breath sounds. No wheezing.   Abdominal:      General: There is no distension.      Palpations: Abdomen is soft.      Tenderness: There is no abdominal tenderness.     Musculoskeletal:         General: Swelling and tenderness present.      Right lower leg: Edema present.      Left lower leg: Edema present.     Skin:     Findings: Rash present.      Comments: LLE with large circular erythematous rash with skin break. Currently wrapped     Neurological:      General: No focal deficit present.      Mental Status: She is alert and oriented to person, place, and time. Mental status is at baseline.         Relevant Results  Scheduled medications  Scheduled Medications[1]  Continuous medications  Continuous Medications[2]  PRN medications  PRN Medications[3]    Results for orders placed or performed during the hospital encounter of 08/01/25 (from the past 24 hours)   Urine electrolytes   Result Value  Ref Range    Sodium, Urine Random 85 mmol/L    Sodium/Creatinine Ratio 110 Not established. mmol/g Creat    Potassium, Urine Random 19 mmol/L    Potassium/Creatinine Ratio 25 Not established mmol/g Creat    Chloride, Urine Random 91 mmol/L    Chloride/Creatinine Ratio 118 38 - 318 mmol/g creat    Creatinine, Urine Random 77.1 20.0 - 320.0 mg/dL   Urea Nitrogen, Urine Random   Result Value Ref Range    Urea Nitrogen, Urine Random 348 mg/dL    Creatinine, Urine Random 77.1 20.0 - 320.0 mg/dL    Urea Nitrogen/Creatinine Ratio 4.5 Not established. g/g creat   POCT GLUCOSE   Result Value Ref Range    POCT Glucose 172 (H) 74 - 99 mg/dL   CBC   Result Value Ref Range    WBC 6.3 4.4 - 11.3 x10*3/uL    nRBC 0.0 0.0 - 0.0 /100 WBCs    RBC 4.04 4.00 - 5.20 x10*6/uL    Hemoglobin 11.6 (L) 12.0 - 16.0 g/dL    Hematocrit 36.0 36.0 - 46.0 %    MCV 89 80 - 100 fL    MCH 28.7 26.0 - 34.0 pg    MCHC 32.2 32.0 - 36.0 g/dL    RDW 12.5 11.5 - 14.5 %    Platelets 187 150 - 450 x10*3/uL   Renal Function Panel   Result Value Ref Range    Glucose 195 (H) 74 - 99 mg/dL    Sodium 134 (L) 136 - 145 mmol/L    Potassium 4.0 3.5 - 5.3 mmol/L    Chloride 101 98 - 107 mmol/L    Bicarbonate 26 21 - 32 mmol/L    Anion Gap 11 10 - 20 mmol/L    Urea Nitrogen 27 (H) 6 - 23 mg/dL    Creatinine 1.28 (H) 0.50 - 1.05 mg/dL    eGFR 48 (L) >60 mL/min/1.73m*2    Calcium 8.3 (L) 8.6 - 10.3 mg/dL    Phosphorus 2.7 2.5 - 4.9 mg/dL    Albumin 3.3 (L) 3.4 - 5.0 g/dL   Magnesium   Result Value Ref Range    Magnesium 1.71 1.60 - 2.40 mg/dL             Assessment & Plan  Injury of lower extremity    Leg wound, left, subsequent encounter      Yessy Ortega is a 59 y.o. female w/ Hx of HTN, T2DM, hypercholesterolemia, lymphedema, bipolar disorder, cellulitis, MDD, arthritis, umbilical hernia s/p repair, JULIO C, diabetic retinopathy, morbid obesity, restless leg syndrome, gout, lumbar radiculopathy, ulcerative colitis who was admitted on 8/1/25 for acutely worsening  left lower leg wound/pain w/ unclear etiology.     Acute Medical Issue  # Left lower leg wound/pain w/ unclear etiology   :: DDx include: irritant contact dermatitis vs early pyoderma gangrenosum vs low suspicion for cellulitis   - Continue cefazolin (8/2 - )  - Continue leg elevation  - Gen surg and wound care following, wound care orders per them  - Supportive care in place with pain medications and anti-emetics     #Pre-renal MEDINA 2/2 dehydration (resolved)  - BUN/Cr 27/1.28 w/ GFR 48, improved from Cr 1.75 yesterday  - Baseline usually WNL at around 1  - Continue to trend RFPs     Chronic Medical Issues:  # T2DM - Lispro injections 0-20 units before meals, Lantus 80 units daily  # Bipolar disorder - lamotrigine 150 mg BID, Seroquel 400 mg nightly  # HTN- lisinopril 10 mg daily, metoprolol 50 mg daily  #Chronic rhinitis - fluticasone 2 sprays each nostril  #Hypercholesterolemia- rosuvastatin 20 mg daily   #Ulcerative Colitis - sulfasalazine 500 mg TID      Fluids: PO intake  Electrolytes: Replete PRN  Nutrition: Consistent carb diet  Antimicrobials: None  DVT ppx: Lovenox  Catheter: None  Lines: PIV  Supplemental Oxygen: None  Emergency Contact: Extended Emergency Contact Information  Primary Emergency Contact: OrtegaMaryana  Address: [address] [place] [address]  Home Phone: [phone]  Work Phone: [phone]  Relation: Spouse  Secondary Emergency Contact: Khushboo aGrza  Home Phone: [phone]  Relation: Daughter   Code: Full Code      Disposition: Discharge to home pending clinical improvement.      Elkin Tucker MD  PGY-3 IM resident           [1] aspirin, 81 mg, oral, Daily  ceFAZolin, 1 g, intravenous, q8h  enoxaparin, 40 mg, subcutaneous, q12h STEVEN  fluticasone, 2 spray, Each Nostril, Daily  insulin glargine, 80 Units, subcutaneous, Daily  insulin lispro, 0-20 Units, subcutaneous, TID AC  lamoTRIgine, 150 mg, oral, BID  lisinopril, 10 mg, oral,  Daily  metoprolol succinate XL, 50 mg, oral, Daily  pantoprazole, 40 mg, oral, Daily before breakfast  polyethylene glycol, 17 g, oral, Daily  QUEtiapine, 400 mg, oral, Nightly  rosuvastatin, 20 mg, oral, Daily  sulfaSALAzine, 500 mg, oral, TID  [2]    [3] PRN medications: acetaminophen, albuterol, dextrose, dextrose, [Held by provider] furosemide, glucagon, glucagon, HYDROmorphone, HYDROmorphone, HYDROmorphone, hydrOXYzine HCL, ondansetron, pramipexole, pramipexole, prochlorperazine, tiZANidine

## 2025-08-04 ENCOUNTER — APPOINTMENT (OUTPATIENT)
Dept: WOUND CARE | Facility: HOSPITAL | Age: 60
End: 2025-08-04
Payer: COMMERCIAL

## 2025-08-04 LAB
ALBUMIN SERPL BCP-MCNC: 3.1 G/DL (ref 3.4–5)
ANION GAP SERPL CALC-SCNC: 11 MMOL/L (ref 10–20)
BUN SERPL-MCNC: 26 MG/DL (ref 6–23)
CALCIUM SERPL-MCNC: 8.4 MG/DL (ref 8.6–10.3)
CHLORIDE SERPL-SCNC: 103 MMOL/L (ref 98–107)
CO2 SERPL-SCNC: 26 MMOL/L (ref 21–32)
CREAT SERPL-MCNC: 1.16 MG/DL (ref 0.5–1.05)
EGFRCR SERPLBLD CKD-EPI 2021: 54 ML/MIN/1.73M*2
ERYTHROCYTE [DISTWIDTH] IN BLOOD BY AUTOMATED COUNT: 12.3 % (ref 11.5–14.5)
GLUCOSE BLD MANUAL STRIP-MCNC: 219 MG/DL (ref 74–99)
GLUCOSE SERPL-MCNC: 174 MG/DL (ref 74–99)
HCT VFR BLD AUTO: 34.1 % (ref 36–46)
HGB BLD-MCNC: 10.9 G/DL (ref 12–16)
MAGNESIUM SERPL-MCNC: 1.85 MG/DL (ref 1.6–2.4)
MCH RBC QN AUTO: 28.6 PG (ref 26–34)
MCHC RBC AUTO-ENTMCNC: 32 G/DL (ref 32–36)
MCV RBC AUTO: 90 FL (ref 80–100)
NRBC BLD-RTO: 0 /100 WBCS (ref 0–0)
PHOSPHATE SERPL-MCNC: 2.9 MG/DL (ref 2.5–4.9)
PLATELET # BLD AUTO: 179 X10*3/UL (ref 150–450)
POTASSIUM SERPL-SCNC: 4 MMOL/L (ref 3.5–5.3)
RBC # BLD AUTO: 3.81 X10*6/UL (ref 4–5.2)
SODIUM SERPL-SCNC: 136 MMOL/L (ref 136–145)
WBC # BLD AUTO: 6.2 X10*3/UL (ref 4.4–11.3)

## 2025-08-04 PROCEDURE — 80069 RENAL FUNCTION PANEL: CPT

## 2025-08-04 PROCEDURE — 2500000001 HC RX 250 WO HCPCS SELF ADMINISTERED DRUGS (ALT 637 FOR MEDICARE OP)

## 2025-08-04 PROCEDURE — 97165 OT EVAL LOW COMPLEX 30 MIN: CPT | Mod: GO

## 2025-08-04 PROCEDURE — 85027 COMPLETE CBC AUTOMATED: CPT

## 2025-08-04 PROCEDURE — 2500000002 HC RX 250 W HCPCS SELF ADMINISTERED DRUGS (ALT 637 FOR MEDICARE OP, ALT 636 FOR OP/ED)

## 2025-08-04 PROCEDURE — 99232 SBSQ HOSP IP/OBS MODERATE 35: CPT

## 2025-08-04 PROCEDURE — 36415 COLL VENOUS BLD VENIPUNCTURE: CPT

## 2025-08-04 PROCEDURE — 83735 ASSAY OF MAGNESIUM: CPT

## 2025-08-04 PROCEDURE — 82947 ASSAY GLUCOSE BLOOD QUANT: CPT

## 2025-08-04 PROCEDURE — 2500000004 HC RX 250 GENERAL PHARMACY W/ HCPCS (ALT 636 FOR OP/ED)

## 2025-08-04 PROCEDURE — 1100000001 HC PRIVATE ROOM DAILY

## 2025-08-04 RX ORDER — OXYCODONE HYDROCHLORIDE 5 MG/1
5 TABLET ORAL EVERY 4 HOURS PRN
Refills: 0 | Status: DISCONTINUED | OUTPATIENT
Start: 2025-08-04 | End: 2025-08-05 | Stop reason: HOSPADM

## 2025-08-04 RX ORDER — OXYCODONE HYDROCHLORIDE 5 MG/1
2.5 TABLET ORAL EVERY 4 HOURS PRN
Refills: 0 | Status: DISCONTINUED | OUTPATIENT
Start: 2025-08-04 | End: 2025-08-05 | Stop reason: HOSPADM

## 2025-08-04 RX ADMIN — QUETIAPINE FUMARATE 400 MG: 100 TABLET ORAL at 20:15

## 2025-08-04 RX ADMIN — SULFASALAZINE 500 MG: 500 TABLET ORAL at 17:01

## 2025-08-04 RX ADMIN — SULFASALAZINE 500 MG: 500 TABLET ORAL at 08:30

## 2025-08-04 RX ADMIN — POLYETHYLENE GLYCOL 3350 17 G: 17 POWDER, FOR SOLUTION ORAL at 08:30

## 2025-08-04 RX ADMIN — INSULIN GLARGINE 80 UNITS: 100 INJECTION, SOLUTION SUBCUTANEOUS at 08:36

## 2025-08-04 RX ADMIN — LAMOTRIGINE 150 MG: 100 TABLET ORAL at 08:30

## 2025-08-04 RX ADMIN — HYDROMORPHONE HYDROCHLORIDE 0.6 MG: 1 INJECTION, SOLUTION INTRAMUSCULAR; INTRAVENOUS; SUBCUTANEOUS at 02:53

## 2025-08-04 RX ADMIN — PANTOPRAZOLE SODIUM 40 MG: 40 TABLET, DELAYED RELEASE ORAL at 06:07

## 2025-08-04 RX ADMIN — ASPIRIN 81 MG: 81 TABLET, DELAYED RELEASE ORAL at 08:30

## 2025-08-04 RX ADMIN — CEFAZOLIN SODIUM 1 G: 1 INJECTION, SOLUTION INTRAVENOUS at 06:07

## 2025-08-04 RX ADMIN — OXYCODONE HYDROCHLORIDE 5 MG: 5 TABLET ORAL at 10:19

## 2025-08-04 RX ADMIN — ENOXAPARIN SODIUM 40 MG: 100 INJECTION SUBCUTANEOUS at 08:29

## 2025-08-04 RX ADMIN — SULFASALAZINE 500 MG: 500 TABLET ORAL at 20:15

## 2025-08-04 RX ADMIN — LISINOPRIL 10 MG: 10 TABLET ORAL at 08:30

## 2025-08-04 RX ADMIN — INSULIN LISPRO 8 UNITS: 100 INJECTION, SOLUTION INTRAVENOUS; SUBCUTANEOUS at 17:00

## 2025-08-04 RX ADMIN — ENOXAPARIN SODIUM 40 MG: 100 INJECTION SUBCUTANEOUS at 20:15

## 2025-08-04 RX ADMIN — CEFAZOLIN SODIUM 1 G: 1 INJECTION, SOLUTION INTRAVENOUS at 20:14

## 2025-08-04 RX ADMIN — ROSUVASTATIN CALCIUM 20 MG: 20 TABLET, FILM COATED ORAL at 08:30

## 2025-08-04 RX ADMIN — METOPROLOL SUCCINATE 50 MG: 50 TABLET, EXTENDED RELEASE ORAL at 08:30

## 2025-08-04 RX ADMIN — LAMOTRIGINE 150 MG: 100 TABLET ORAL at 20:15

## 2025-08-04 RX ADMIN — CEFAZOLIN SODIUM 1 G: 1 INJECTION, SOLUTION INTRAVENOUS at 13:49

## 2025-08-04 RX ADMIN — ACETAMINOPHEN 487.5 MG: 325 TABLET ORAL at 20:16

## 2025-08-04 RX ADMIN — HYDROMORPHONE HYDROCHLORIDE 1 MG: 1 INJECTION, SOLUTION INTRAMUSCULAR; INTRAVENOUS; SUBCUTANEOUS at 13:49

## 2025-08-04 RX ADMIN — INSULIN LISPRO 4 UNITS: 100 INJECTION, SOLUTION INTRAVENOUS; SUBCUTANEOUS at 08:29

## 2025-08-04 ASSESSMENT — COGNITIVE AND FUNCTIONAL STATUS - GENERAL
DAILY ACTIVITIY SCORE: 23
CLIMB 3 TO 5 STEPS WITH RAILING: A LITTLE
HELP NEEDED FOR BATHING: A LITTLE
DRESSING REGULAR LOWER BODY CLOTHING: A LITTLE
WALKING IN HOSPITAL ROOM: A LITTLE
MOBILITY SCORE: 22
DAILY ACTIVITIY SCORE: 23

## 2025-08-04 ASSESSMENT — PAIN DESCRIPTION - LOCATION
LOCATION: LEG
LOCATION: LEG

## 2025-08-04 ASSESSMENT — PAIN SCALES - GENERAL
PAINLEVEL_OUTOF10: 7
PAINLEVEL_OUTOF10: 7
PAINLEVEL_OUTOF10: 0 - NO PAIN
PAINLEVEL_OUTOF10: 4
PAINLEVEL_OUTOF10: 3
PAINLEVEL_OUTOF10: 6

## 2025-08-04 ASSESSMENT — PAIN - FUNCTIONAL ASSESSMENT
PAIN_FUNCTIONAL_ASSESSMENT: 0-10

## 2025-08-04 ASSESSMENT — PAIN DESCRIPTION - ORIENTATION
ORIENTATION: LOWER;MID
ORIENTATION: LEFT

## 2025-08-04 ASSESSMENT — ACTIVITIES OF DAILY LIVING (ADL)
ADL_ASSISTANCE: INDEPENDENT
BATHING_ASSISTANCE: STAND BY
LACK_OF_TRANSPORTATION: NO

## 2025-08-04 ASSESSMENT — PAIN DESCRIPTION - DESCRIPTORS: DESCRIPTORS: BURNING

## 2025-08-04 NOTE — CARE PLAN
Problem: Pain - Adult  Goal: Verbalizes/displays adequate comfort level or baseline comfort level  Outcome: Progressing     Problem: Safety - Adult  Goal: Free from fall injury  Outcome: Progressing     Problem: Chronic Conditions and Co-morbidities  Goal: Patient's chronic conditions and co-morbidity symptoms are monitored and maintained or improved  Outcome: Progressing     Problem: Nutrition  Goal: Nutrient intake appropriate for maintaining nutritional needs  Outcome: Progressing     The patient's goals for the shift include  pain control    The clinical goals for the shift include patient will have controlled pain during shift

## 2025-08-04 NOTE — PROGRESS NOTES
Occupational Therapy    Evaluation    Patient Name: Yessy Ortega  MRN: [id number]  Department: [address]  Room: [address] [place], [place] [address]  Today's Date: 8/4/2025  Time Calculation  Start Time: 1431  Stop Time: 1451  Time Calculation (min): 20 min    Assessment  IP OT Assessment  OT Assessment: Pt is a 58 yo F referred to occupational therapy for impaired self-care and functional mobility 2/2 hospitalization for leg wound. Pt demonstrates THANG for bed mobility and supervision for LE dressing and transfers. Pt would benefit from continued OT services at the LOW intensity level for lymphedema management. Pt has no acute OT needs at this time, will discontinue orders.  Prognosis: Good  Barriers to Discharge Home: No anticipated barriers  Evaluation/Treatment Tolerance: Patient tolerated treatment well  Medical Staff Made Aware: Yes  End of Session Communication: Bedside nurse  End of Session Patient Position: Up in chair, Alarm on  Plan:  Treatment Interventions: ADL retraining, Functional transfer training, Endurance training, Patient/family training, Equipment evaluation/education, Compensatory technique education  No Skilled OT: At baseline function  OT Frequency: OT eval only  OT Discharge Recommendations: Low intensity level of continued care (Lymphedema therapy)  OT Recommended Transfer Status: Stand by assist, Assist of 1  OT - OK to Discharge: Yes (Per OT POC)    Subjective   Current Problem:  1. Leg wound, left, subsequent encounter        2. Intractable pain          OT Visit Info:  OT Received On: 08/04/25  General Visit Info:  General  Reason for Referral: Pt is a 58 yo F referred to occupational therapy for impaired self-care and functional mobility 2/2 hospitalization for leg wound  Referred By: Julia Mosley MD  Past Medical History Relevant to Rehab: HTN, T2DM, Hypercholesterolemia,  lymphedema, LE abscess, cellulitis, MDD, arthritis, umbilical hernia s/p repair, JULIO C, diabetic retinopathy, morbid obesity, restless  leg syndrome, gout, lumbar radiculopathy, Bipolar depression, and ulcerative colitis  Family/Caregiver Present: No  Prior to Session Communication: Bedside nurse  Patient Position Received: Alarm on, Bed, 3 rail up  Preferred Learning Style: verbal, visual  General Comment: Pt pleasant and agreeable to therapy session. Pt cleared by RN prior to session.  Precautions:  Medical Precautions: Fall precautions  Precautions Comment: masimo, tele, IV, LLE dressing intact    Vital Signs Comment: Vitals stable throughout session    Pain:  Pain Assessment  Pain Assessment: 0-10  0-10 (Numeric) Pain Score: 3  Pain Type: Acute pain  Pain Location: Leg  Pain Orientation: Lower  Pain Interventions: Repositioned, Ambulation/increased activity  Response to Interventions: Content/relaxed    Objective   Cognition:  Overall Cognitive Status: Within Functional Limits  Arousal/Alertness: Appropriate responses to stimuli  Orientation Level: Oriented X4    Home Living:  Type of Home: House  Lives With: Spouse  Home Adaptive Equipment: Walker rolling or standard  Home Layout: One level  Home Access: Stairs to enter with rails  Entrance Stairs-Rails:  (One)  Entrance Stairs-Number of Steps: 3  Bathroom Shower/Tub: Tub/shower unit  Bathroom Equipment: Grab bars in shower, Shower chair with back   Prior Function:  Level of [place]: Independent with ADLs and functional transfers, Independent with homemaking with ambulation  Receives Help From: Family  ADL Assistance: Independent  Homemaking Assistance: Independent (Has  to complete larger cleaning jobs)  Ambulatory Assistance: Independent (FWW PRN)    ADL:  Eating Assistance: Independent  Grooming Assistance: Independent  Bathing Assistance: Stand by  UE Dressing Assistance: Stand by  LE Dressing Assistance: Stand by  LE Dressing Deficit: Don/doff R sock, Don/doff L sock  Toileting Assistance with Device: Stand by  Functional Assistance: Stand by  ADL Comments: Pt completed bed  mobility THANG and STS and transfer to chair w/ supervision. Pt completed LE dressing w/ supervision  Activity Tolerance:  Endurance: Endurance does not limit participation in activity  Bed Mobility/Transfers:   Bed Mobility  Bed Mobility: Yes  Bed Mobility 1  Bed Mobility 1: Supine to sitting  Level of Assistance 1: Modified independent  Bed Mobility Comments 1: HOB elevated    Transfers  Transfer: Yes  Transfer 1  Transfer From 1: Bed to  Transfer to 1: Chair with arms  Technique 1: Sit to stand, Stand to sit  Transfer Level of Assistance 1: Close supervision  Trials/Comments 1: Pt able to take about 5 steps from EOB to chair w/ supervision, no device    Sitting Balance:  Static Sitting Balance  Static Sitting-Balance Support: Feet supported  Static Sitting-Level of Assistance: Independent  Dynamic Sitting Balance  Dynamic Sitting-Balance Support: Feet supported  Dynamic Sitting-Level of Assistance: Independent  Dynamic Sitting-Balance: Forward lean  Standing Balance:  Static Standing Balance  Static Standing-Balance Support: No upper extremity supported  Static Standing-Level of Assistance: Close supervision  Dynamic Standing Balance  Dynamic Standing-Balance Support: No upper extremity supported  Dynamic Standing-Level of Assistance: Close supervision  Dynamic Standing-Balance: Turning    Sensation:  Light Touch: No apparent deficits  Strength:  Strength Comments: BUE functionally 4/5  Coordination:  Movements are Fluid and Coordinated: Yes   Hand Function:  Hand Function  Gross Grasp: Functional  Coordination: Functional  Extremities:   RUE: Within Functional Limits   LUE: Within Functional Limits    Outcome Measures:   [facility] Daily Activity  Putting on and taking off regular lower body clothing: None  Bathing (including washing, rinsing, drying): A little  Putting on and taking off regular upper body clothing: None  Toileting, which includes using toilet, bedpan or urinal: None  Taking care of personal grooming  such as brushing teeth: None  Eating Meals: None  Daily Activity - Total Score: 23    Education Documentation  Body Mechanics, taught by Starr Richards OT at 8/4/2025  3:08 PM.  Learner: Patient  Readiness: Acceptance  Method: Explanation  Response: Verbalizes Understanding  Comment: Pt educated on POC, DC recs, safety and body mechanics when completing transfers and ADLs    ADL Training, taught by Starr Richards OT at 8/4/2025  3:08 PM.  Learner: Patient  Readiness: Acceptance  Method: Explanation  Response: Verbalizes Understanding  Comment: Pt educated on POC, DC recs, safety and body mechanics when completing transfers and ADLs

## 2025-08-04 NOTE — CONSULTS
Wound Care Consult     Visit Date: 8/4/2025      Patient Name: Yessy Ortega         MRN: [id number]             Reason for Consult: LLE          Wound Plan: Reviewed medical record and history.  Rounded to see patient, nursing has done dressing changes, orders provided by surgery team, no other needs per patient.  Family at bedside state patient is worsening, patient agrees; however, with specific questions, pain is lessening, although it hurts because a family member was moving it around.  Luz Marina DOWLING is in process of medicating for pain and is planning to change dressing, declines need for assistance.  Orders in place.  Please message if questions or if support needed.      Jenny Sifuentes RN, [facility]  8/4/2025  6:53 PM

## 2025-08-04 NOTE — PROGRESS NOTES
"Internal Medicine - Daily Progress Note   Hospital Day: 4       Name:Asher Ortega, AGE: 59 y.o., GENDER: female, MRN: [id number], ROOM: 232/[facility]   CODE STATUS: Full Code  Attending Physician: Nas Sequeira DO  Student: ASHER KUMAR        Chief Complaint     Chief Complaint   Patient presents with    Leg Pain        Subjective    Asher Ortega is a 59 y.o. year old female patient on Hospital Day: 4    Overnight events:None     Patient seen and examined at bedside. Reports improving pain 6-7/10 in LLE this morning. She does report episodes of a stabbing pain which last for roughly 5 minutes but reports overall improvement. She has noticed some pain at the back of the leg.       Meds    Scheduled medications  Scheduled Medications[1]  Continuous medications  Continuous Medications[2]  PRN medications  PRN Medications[3]     Objective    Physical Exam  Constitutional:       General: She is not in acute distress.     Appearance: She is obese. She is not ill-appearing.     Cardiovascular:      Rate and Rhythm: Normal rate and regular rhythm.      Heart sounds: Normal heart sounds. No murmur heard.  Pulmonary:      Effort: Pulmonary effort is normal.      Breath sounds: Normal breath sounds.   Abdominal:      Palpations: Abdomen is soft.      Tenderness: There is no abdominal tenderness.     Musculoskeletal:         General: Swelling present.      Right lower leg: Edema present.      Left lower leg: Edema present.     Skin:     General: Skin is warm and dry.      Comments: LLE wound has erythematous border, wound wrapped and elevated     Psychiatric:         Mood and Affect: Mood normal.         Behavior: Behavior normal.        Visit Vitals  /59 (BP Location: Right arm)   Pulse 81   Temp 36.6 °C (97.9 °F) (Temporal)   Resp 18   Ht 1.575 m (5' 2\")   Wt 122 kg (268 lb 11.9 oz)   SpO2 95%   BMI 49.15 kg/m²   Smoking Status Never   BSA 2.31 m²        Intake/Output Summary (Last 24 hours) at 8/4/2025 " "1141  Last data filed at 8/4/2025 0850  Gross per 24 hour   Intake 410 ml   Output --   Net 410 ml       Labs:   Results from last 72 hours   Lab Units 08/04/25  0519 08/03/25 0618 08/02/25  0531   SODIUM mmol/L 136 134* 134*   POTASSIUM mmol/L 4.0 4.0 4.6   CHLORIDE mmol/L 103 101 102   CO2 mmol/L 26 26 28   BUN mg/dL 26* 27* 27*   CREATININE mg/dL 1.16* 1.28* 1.75*   GLUCOSE mg/dL 174* 195* 253*   CALCIUM mg/dL 8.4* 8.3* 8.7   ANION GAP mmol/L 11 11 9*   EGFR mL/min/1.73m*2 54* 48* 33*   PHOSPHORUS mg/dL 2.9 2.7 3.3      Results from last 72 hours   Lab Units 08/04/25 0519 08/03/25 0618 08/02/25  0531   WBC AUTO x10*3/uL 6.2 6.3 7.7   HEMOGLOBIN g/dL 10.9* 11.6* 11.6*   HEMATOCRIT % 34.1* 36.0 37.6   PLATELETS AUTO x10*3/uL 179 187 223      Lab Results   Component Value Date    CALCIUM 8.4 (L) 08/04/2025    PHOS 2.9 08/04/2025      Lab Results   Component Value Date    CRP 0.98 08/01/2025       [unfilled]     Micro/ID:   Susceptibility data from last 90 days.  Collected Specimen Info Organism   08/01/25 Tissue/Biopsy from Wound/Tissue Mixed Gram-Positive and Gram-Negative Bacteria                    No lab exists for component: \"AGALPCRNB\"     No results found for: \"URINECULTURE\", \"BLOODCULT\", \"CSFCULTSMEAR\"    Images    Imaging  CT tibia fibula left wo IV contrast  Result Date: 8/2/2025  Generalized soft tissue swelling and skin thickening of the entirety of the visualized left lower leg. Findings could have a component of cellulitis. There are few scattered tiny calcifications in the soft tissues mostly medial which could suggest a component of chronic venous stasis.     MACRO: None   Signed by: García Rodarte 8/2/2025 6:18 AM Dictation workstation:   [id number]    XR tibia fibula left 2 views  Result Date: 8/1/2025  Subcutaneous soft tissue edema without acute fracture or dislocation.   MACRO: None   Signed by: Shaji Martinez 8/1/2025 12:27 PM Dictation workstation:   [id number]      Cardiology, " Vascular, and Other Imaging  No other imaging results found for the past 7 days      Assessment and Plan      Yessy Ortega is a 59 y.o. female w/ Hx of HTN, T2DM, hypercholesterolemia, lymphedema, bipolar disorder, cellulitis, MDD, arthritis, umbilical hernia s/p repair, JULIO C, diabetic retinopathy, morbid obesity, restless leg syndrome, gout, lumbar radiculopathy, ulcerative colitis who was admitted on 8/1/25 for acutely worsening left lower leg wound/pain w/ unclear etiology.     ACUTE MEDICAL ISSUES:  # Left lower leg wound/pain w/ unclear etiology   - DDx include: irritant contact dermatitis vs early pyoderma gangrenosum vs low suspicion for cellulitis   - Continue cefazolin (8/2 - )  - Continue leg elevation  - Gen surg and wound care following  - PRN pain meds, changed from IV Dilaudid to oral oxycodone      ADDRESSED/ RESOLVED MEDICAL ISSUES:  #Pre-renal MEDINA 2/2 dehydration (resolved)  - BUN/Cr 27/1.28 w/ GFR 48, improved from Cr 1.75  - Baseline usually WNL at around 1  - Continue monitoring RFPs      CHRONIC MEDICAL ISSUES:  Chronic Medical Issues:  # T2DM - Lispro injections 0-20 units before meals, Lantus 80 units daily  # Bipolar disorder - lamotrigine 150 mg BID, Seroquel 400 mg nightly  # HTN- lisinopril 10 mg daily, metoprolol 50 mg daily  #Chronic rhinitis - fluticasone 2 sprays each nostril  #Hypercholesterolemia- rosuvastatin 20 mg daily   #Ulcerative Colitis - sulfasalazine 500 mg TID          Fluids: PO intake  Electrolytes: Replete PRN  Nutrition: Consistent carb diet  Antimicrobials: None  DVT ppx: Lovenox  Catheter: None  Lines: PIV  Supplemental Oxygen: None   Emergency Contact: Extended Emergency Contact Information  Primary Emergency Contact: Maryana Ortega  Address: [address] [place] [address] [facility]  Home Phone: [phone]  Work Phone: [phone]  Relation: Spouse  Secondary Emergency Contact: Khushboo Garza  Home Phone: [phone]  Relation:  Daughter   Code: Full Code     Disposition: Discharge to home pending clinical improvement    Yessy Ordaz, MS3  08/04/25 at 11:41 AM          ATTESTATION TO MEDICAL STUDENT NOTE     Subjective: Patient continues to report right lower extremity pain, however overall improved from admission    Pertinent Objective Findings: Vitals have remained stable    Pertinent Exam Findings: Right lower extremity wound remains wrapped    Assessment: Right lower extremity cellulitis versus contact dermatitis    Plan: On cefazolin since 8/2, anticipate discharge tomorrow on oral antibiotics.  Continue leg elevation and pain control    PGY-2 Attestation: I saw and evaluated the patient.  I personally obtained the key and critical portions of the history and physical exam or was physically present for key and critical portions performed by the student. I reviewed the student's documentation and discussed the patient with the student.  I agree with the documentation as detailed in the note unless stated otherwise in this attestation.     Graham Ovalle, DO  Internal Medicine, PGY-2  08/04/25 at 2:10 PM                    [1] aspirin, 81 mg, oral, Daily  ceFAZolin, 1 g, intravenous, q8h  enoxaparin, 40 mg, subcutaneous, q12h STEVEN  fluticasone, 2 spray, Each Nostril, Daily  insulin glargine, 80 Units, subcutaneous, Daily  insulin lispro, 0-20 Units, subcutaneous, TID AC  lamoTRIgine, 150 mg, oral, BID  lisinopril, 10 mg, oral, Daily  metoprolol succinate XL, 50 mg, oral, Daily  pantoprazole, 40 mg, oral, Daily before breakfast  polyethylene glycol, 17 g, oral, Daily  QUEtiapine, 400 mg, oral, Nightly  rosuvastatin, 20 mg, oral, Daily  sulfaSALAzine, 500 mg, oral, TID  [2]    [3] PRN medications: acetaminophen, albuterol, dextrose, dextrose, [Held by provider] furosemide, glucagon, glucagon, HYDROmorphone, hydrOXYzine HCL, ondansetron, oxyCODONE, oxyCODONE, pramipexole, pramipexole, prochlorperazine, tiZANidine

## 2025-08-04 NOTE — PROGRESS NOTES
Yessy Ortega is a 59 y.o. female on day 2 of admission presenting with Leg wound, left, subsequent encounter.    Subjective   Interval History: no fever, no new complaints        Review of Systems    Objective   Range of Vitals (last 24 hours)  Heart Rate:  [67-99]   Temp:  [36.2 °C (97.2 °F)-36.9 °C (98.4 °F)]   Resp:  [16-18]   BP: ()/(50-59)   SpO2:  [88 %-95 %]   Daily Weight  08/03/25 : 122 kg (268 lb 11.9 oz)    Body mass index is 49.15 kg/m².    Physical Exam  Constitutional:       Appearance: Normal appearance.   HENT:      Head: Normocephalic and atraumatic.      Mouth/Throat:      Mouth: Mucous membranes are moist.      Pharynx: Oropharynx is clear.     Eyes:      Pupils: Pupils are equal, round, and reactive to light.       Cardiovascular:      Rate and Rhythm: Normal rate and regular rhythm.      Heart sounds: Normal heart sounds.   Pulmonary:      Effort: Pulmonary effort is normal.      Breath sounds: Normal breath sounds.   Abdominal:      General: Abdomen is flat. Bowel sounds are normal.      Palpations: Abdomen is soft.     Musculoskeletal:         General: Swelling present.      Cervical back: Normal range of motion.      Comments: Lt shin wound, less tenderness, less edema     Neurological:      Mental Status: She is alert.         Antibiotics  ceFAZolin - 1 gram/50 mL    Relevant Results  Labs  Results from last 72 hours   Lab Units 08/04/25  0519 08/03/25  0618 08/02/25  0531   WBC AUTO x10*3/uL 6.2 6.3 7.7   HEMOGLOBIN g/dL 10.9* 11.6* 11.6*   HEMATOCRIT % 34.1* 36.0 37.6   PLATELETS AUTO x10*3/uL 179 187 223     Results from last 72 hours   Lab Units 08/04/25  0519 08/03/25  0618 08/02/25  0531   SODIUM mmol/L 136 134* 134*   POTASSIUM mmol/L 4.0 4.0 4.6   CHLORIDE mmol/L 103 101 102   CO2 mmol/L 26 26 28   BUN mg/dL 26* 27* 27*   CREATININE mg/dL 1.16* 1.28* 1.75*   GLUCOSE mg/dL 174* 195* 253*   CALCIUM mg/dL 8.4* 8.3* 8.7   ANION GAP mmol/L 11 11 9*   EGFR mL/min/1.73m*2 54* 48*  33*   PHOSPHORUS mg/dL 2.9 2.7 3.3     Results from last 72 hours   Lab Units 08/04/25  0519 08/03/25  0618 08/02/25  0531   ALBUMIN g/dL 3.1* 3.3* 3.4     Estimated Creatinine Clearance: 64.2 mL/min (A) (by C-G formula based on SCr of 1.16 mg/dL (H)).  C-Reactive Protein   Date Value Ref Range Status   08/01/2025 0.98 <1.00 mg/dL Final     CRP   Date Value Ref Range Status   04/30/2018 1.60 (A) mg/dL Final     Comment:     REF VALUE  < 1.00       Microbiology  Susceptibility data from last 14 days.  Collected Specimen Info Organism   08/01/25 Tissue/Biopsy from Wound/Tissue Mixed Gram-Positive and Gram-Negative Bacteria   Imaging  Reviewed        Assessment/Plan   Left shin none healing ulcer / infection, the wound could be a combination of stasis and NLD, the culture with mixed lopez  Legs stasis     Recommendations :  Continue Cefazolin, plan on oral Keflex x 1 week with discharge  Discussed with the medical team     I spent minutes in the professional and overall care of this patient.          Samra Beltran MD

## 2025-08-04 NOTE — CARE PLAN
Problem: Pain - Adult  Goal: Verbalizes/displays adequate comfort level or baseline comfort level  Outcome: Progressing     Problem: Safety - Adult  Goal: Free from fall injury  Outcome: Progressing    The clinical goals for the shift include pt will have decrease in pain

## 2025-08-04 NOTE — PROGRESS NOTES
08/04/25 1345   Discharge Planning   Living Arrangements Spouse/significant other   Support Systems Spouse/significant other   Assistance Needed A&Ox3, independent with ADLs, no DME, room air at baseline, CPAP at night. Does not drive ([place]). Pt follows with wound care. PCP Dr. Emely Adame   Type of Residence Private residence   Number of Stairs to Enter Residence 3   Number of Stairs Within Residence 0   Do you have animals or pets at home? No   Who is requesting discharge planning? Provider   Home or Post Acute Services None   Expected Discharge Disposition Home   Does the patient need discharge transport arranged? No   Financial Resource Strain   How hard is it for you to pay for the very basics like food, housing, medical care, and heating? Not hard   Housing Stability   In the last 12 months, was there a time when you were not able to pay the mortgage or rent on time? N   In the past 12 months, how many times have you moved where you were living? 0   At any time in the past 12 months, were you homeless or living in a shelter (including now)? N   Transportation Needs   In the past 12 months, has lack of transportation kept you from medical appointments or from getting medications? no   In the past 12 months, has lack of transportation kept you from meetings, work, or from getting things needed for daily living? No   Patient Choice   Provider Choice list and CMS website (https://medicare.gov/care-compare#search) for post-acute Quality and Resource Measure Data were provided and reviewed with: Patient;Family   Patient / Family choosing to utilize agency / facility established prior to hospitalization No   Stroke Family Assessment   Stroke Family Assessment Needed No   Intensity of Service   Intensity of Service 0-30 min

## 2025-08-05 ENCOUNTER — DOCUMENTATION (OUTPATIENT)
Dept: HOME HEALTH SERVICES | Facility: HOME HEALTH | Age: 60
End: 2025-08-05
Payer: COMMERCIAL

## 2025-08-05 ENCOUNTER — HOME HEALTH ADMISSION (OUTPATIENT)
Dept: HOME HEALTH SERVICES | Facility: HOME HEALTH | Age: 60
End: 2025-08-05
Payer: COMMERCIAL

## 2025-08-05 ENCOUNTER — PHARMACY VISIT (OUTPATIENT)
Dept: PHARMACY | Facility: CLINIC | Age: 60
End: 2025-08-05
Payer: COMMERCIAL

## 2025-08-05 VITALS
HEART RATE: 76 BPM | WEIGHT: 267.64 LBS | RESPIRATION RATE: 22 BRPM | HEIGHT: 62 IN | SYSTOLIC BLOOD PRESSURE: 154 MMHG | BODY MASS INDEX: 49.25 KG/M2 | OXYGEN SATURATION: 93 % | TEMPERATURE: 97.9 F | DIASTOLIC BLOOD PRESSURE: 62 MMHG

## 2025-08-05 PROBLEM — L03.116 CELLULITIS OF LEFT LOWER EXTREMITY: Status: RESOLVED | Noted: 2025-08-05 | Resolved: 2025-08-05

## 2025-08-05 PROBLEM — L03.116 CELLULITIS OF LEFT LOWER EXTREMITY: Status: ACTIVE | Noted: 2025-08-05

## 2025-08-05 PROBLEM — L24.9 IRRITANT CONTACT DERMATITIS: Status: RESOLVED | Noted: 2025-08-03 | Resolved: 2025-08-05

## 2025-08-05 LAB
ALBUMIN SERPL BCP-MCNC: 3.2 G/DL (ref 3.4–5)
ANION GAP SERPL CALC-SCNC: 10 MMOL/L (ref 10–20)
BUN SERPL-MCNC: 19 MG/DL (ref 6–23)
CALCIUM SERPL-MCNC: 8.5 MG/DL (ref 8.6–10.3)
CHLORIDE SERPL-SCNC: 104 MMOL/L (ref 98–107)
CO2 SERPL-SCNC: 28 MMOL/L (ref 21–32)
CREAT SERPL-MCNC: 0.95 MG/DL (ref 0.5–1.05)
EGFRCR SERPLBLD CKD-EPI 2021: 69 ML/MIN/1.73M*2
ERYTHROCYTE [DISTWIDTH] IN BLOOD BY AUTOMATED COUNT: 12.1 % (ref 11.5–14.5)
GLUCOSE BLD MANUAL STRIP-MCNC: 133 MG/DL (ref 74–99)
GLUCOSE BLD MANUAL STRIP-MCNC: 197 MG/DL (ref 74–99)
GLUCOSE SERPL-MCNC: 134 MG/DL (ref 74–99)
HCT VFR BLD AUTO: 33.6 % (ref 36–46)
HGB BLD-MCNC: 11.3 G/DL (ref 12–16)
MAGNESIUM SERPL-MCNC: 1.77 MG/DL (ref 1.6–2.4)
MCH RBC QN AUTO: 28.7 PG (ref 26–34)
MCHC RBC AUTO-ENTMCNC: 33.6 G/DL (ref 32–36)
MCV RBC AUTO: 85 FL (ref 80–100)
NRBC BLD-RTO: 0 /100 WBCS (ref 0–0)
PHOSPHATE SERPL-MCNC: 2.8 MG/DL (ref 2.5–4.9)
PLATELET # BLD AUTO: 180 X10*3/UL (ref 150–450)
POTASSIUM SERPL-SCNC: 4.2 MMOL/L (ref 3.5–5.3)
RBC # BLD AUTO: 3.94 X10*6/UL (ref 4–5.2)
SODIUM SERPL-SCNC: 138 MMOL/L (ref 136–145)
WBC # BLD AUTO: 5.7 X10*3/UL (ref 4.4–11.3)

## 2025-08-05 PROCEDURE — 36415 COLL VENOUS BLD VENIPUNCTURE: CPT

## 2025-08-05 PROCEDURE — 2500000001 HC RX 250 WO HCPCS SELF ADMINISTERED DRUGS (ALT 637 FOR MEDICARE OP)

## 2025-08-05 PROCEDURE — 83735 ASSAY OF MAGNESIUM: CPT

## 2025-08-05 PROCEDURE — 82947 ASSAY GLUCOSE BLOOD QUANT: CPT

## 2025-08-05 PROCEDURE — 2500000004 HC RX 250 GENERAL PHARMACY W/ HCPCS (ALT 636 FOR OP/ED)

## 2025-08-05 PROCEDURE — 2500000002 HC RX 250 W HCPCS SELF ADMINISTERED DRUGS (ALT 637 FOR MEDICARE OP, ALT 636 FOR OP/ED)

## 2025-08-05 PROCEDURE — 99239 HOSP IP/OBS DSCHRG MGMT >30: CPT

## 2025-08-05 PROCEDURE — RXMED WILLOW AMBULATORY MEDICATION CHARGE

## 2025-08-05 PROCEDURE — 80069 RENAL FUNCTION PANEL: CPT

## 2025-08-05 PROCEDURE — 85027 COMPLETE CBC AUTOMATED: CPT

## 2025-08-05 RX ORDER — CEPHALEXIN 500 MG/1
500 CAPSULE ORAL 2 TIMES DAILY
Qty: 14 CAPSULE | Refills: 0 | Status: SHIPPED | OUTPATIENT
Start: 2025-08-05 | End: 2025-08-12

## 2025-08-05 RX ORDER — INSULIN GLARGINE-YFGN 100 [IU]/ML
80 INJECTION, SOLUTION SUBCUTANEOUS DAILY
Status: DISCONTINUED | OUTPATIENT
Start: 2025-08-06 | End: 2025-08-05 | Stop reason: HOSPADM

## 2025-08-05 RX ADMIN — OXYCODONE HYDROCHLORIDE 5 MG: 5 TABLET ORAL at 02:09

## 2025-08-05 RX ADMIN — LAMOTRIGINE 150 MG: 100 TABLET ORAL at 10:05

## 2025-08-05 RX ADMIN — CEFAZOLIN SODIUM 1 G: 1 INJECTION, SOLUTION INTRAVENOUS at 05:53

## 2025-08-05 RX ADMIN — ACETAMINOPHEN 487.5 MG: 325 TABLET ORAL at 05:53

## 2025-08-05 RX ADMIN — INSULIN LISPRO 4 UNITS: 100 INJECTION, SOLUTION INTRAVENOUS; SUBCUTANEOUS at 11:36

## 2025-08-05 RX ADMIN — INSULIN GLARGINE 80 UNITS: 100 INJECTION, SOLUTION SUBCUTANEOUS at 10:06

## 2025-08-05 RX ADMIN — ROSUVASTATIN CALCIUM 20 MG: 20 TABLET, FILM COATED ORAL at 10:05

## 2025-08-05 RX ADMIN — ENOXAPARIN SODIUM 40 MG: 100 INJECTION SUBCUTANEOUS at 10:05

## 2025-08-05 RX ADMIN — METOPROLOL SUCCINATE 50 MG: 50 TABLET, EXTENDED RELEASE ORAL at 10:04

## 2025-08-05 RX ADMIN — SULFASALAZINE 500 MG: 500 TABLET ORAL at 10:07

## 2025-08-05 RX ADMIN — OXYCODONE HYDROCHLORIDE 2.5 MG: 5 TABLET ORAL at 13:53

## 2025-08-05 RX ADMIN — CEFAZOLIN SODIUM 1 G: 1 INJECTION, SOLUTION INTRAVENOUS at 13:52

## 2025-08-05 RX ADMIN — PANTOPRAZOLE SODIUM 40 MG: 40 TABLET, DELAYED RELEASE ORAL at 05:53

## 2025-08-05 RX ADMIN — ASPIRIN 81 MG: 81 TABLET, DELAYED RELEASE ORAL at 10:04

## 2025-08-05 RX ADMIN — LISINOPRIL 10 MG: 10 TABLET ORAL at 10:04

## 2025-08-05 ASSESSMENT — PAIN SCALES - GENERAL
PAINLEVEL_OUTOF10: 4
PAINLEVEL_OUTOF10: 2
PAINLEVEL_OUTOF10: 7
PAINLEVEL_OUTOF10: 0 - NO PAIN

## 2025-08-05 ASSESSMENT — PAIN - FUNCTIONAL ASSESSMENT
PAIN_FUNCTIONAL_ASSESSMENT: 0-10

## 2025-08-05 ASSESSMENT — PAIN DESCRIPTION - LOCATION: LOCATION: LEG

## 2025-08-05 NOTE — PROGRESS NOTES
Yessy Ortega is a 59 y.o. female on day 3 of admission presenting with Leg wound, left, subsequent encounter.    Subjective   Interval History: no fever, no new complaints        Review of Systems    Objective   Range of Vitals (last 24 hours)  Heart Rate:  [68-77]   Temp:  [36.4 °C (97.5 °F)-36.7 °C (98.1 °F)]   Resp:  [16-22]   BP: (116-157)/(54-81)   Weight:  [121 kg (267 lb 10.2 oz)]   SpO2:  [93 %-96 %]   Daily Weight  08/05/25 : 121 kg (267 lb 10.2 oz)    Body mass index is 48.95 kg/m².    Physical Exam  Constitutional:       Appearance: Normal appearance.   HENT:      Head: Normocephalic and atraumatic.      Mouth/Throat:      Mouth: Mucous membranes are moist.      Pharynx: Oropharynx is clear.     Eyes:      Pupils: Pupils are equal, round, and reactive to light.       Cardiovascular:      Rate and Rhythm: Normal rate and regular rhythm.      Heart sounds: Normal heart sounds.   Pulmonary:      Effort: Pulmonary effort is normal.      Breath sounds: Normal breath sounds.   Abdominal:      General: Abdomen is flat. Bowel sounds are normal.      Palpations: Abdomen is soft.     Musculoskeletal:         General: Swelling present.      Cervical back: Normal range of motion.      Comments: Lt shin wound, less tenderness, less edema     Neurological:      Mental Status: She is alert.         Antibiotics  ceFAZolin - 1 gram/50 mL    Relevant Results  Labs  Results from last 72 hours   Lab Units 08/05/25 0624 08/04/25 0519 08/03/25  0618   WBC AUTO x10*3/uL 5.7 6.2 6.3   HEMOGLOBIN g/dL 11.3* 10.9* 11.6*   HEMATOCRIT % 33.6* 34.1* 36.0   PLATELETS AUTO x10*3/uL 180 179 187     Results from last 72 hours   Lab Units 08/05/25  0624 08/04/25  0519 08/03/25  0618   SODIUM mmol/L 138 136 134*   POTASSIUM mmol/L 4.2 4.0 4.0   CHLORIDE mmol/L 104 103 101   CO2 mmol/L 28 26 26   BUN mg/dL 19 26* 27*   CREATININE mg/dL 0.95 1.16* 1.28*   GLUCOSE mg/dL 134* 174* 195*   CALCIUM mg/dL 8.5* 8.4* 8.3*   ANION GAP mmol/L  10 11 11   EGFR mL/min/1.73m*2 69 54* 48*   PHOSPHORUS mg/dL 2.8 2.9 2.7     Results from last 72 hours   Lab Units 08/05/25  0624 08/04/25  0519 08/03/25  0618   ALBUMIN g/dL 3.2* 3.1* 3.3*     Estimated Creatinine Clearance: 78 mL/min (by C-G formula based on SCr of 0.95 mg/dL).  C-Reactive Protein   Date Value Ref Range Status   08/01/2025 0.98 <1.00 mg/dL Final     CRP   Date Value Ref Range Status   04/30/2018 1.60 (A) mg/dL Final     Comment:     REF VALUE  < 1.00       Microbiology  Susceptibility data from last 14 days.  Collected Specimen Info Organism   08/01/25 Tissue/Biopsy from Wound/Tissue Mixed Gram-Positive and Gram-Negative Bacteria   Imaging  Reviewed        Assessment/Plan   Left shin none healing ulcer / infection, the wound could be a combination of stasis and NLD, the culture with mixed lopez  Legs stasis     Recommendations :  Plan on oral Keflex x 1 week with discharge  Discussed with the medical team     I spent minutes in the professional and overall care of this patient.          Samra Beltran MD

## 2025-08-05 NOTE — DISCHARGE SUMMARY
Discharge Diagnosis  Left lower extremity cellulitis  Chronic venous stasis of lower extremity  Chronic lymphedema  Possible contact dermatitis  Acute kidney injury    Issues Requiring Follow-Up  Followup with wound clinic Dr. Mosley for management of chronic leg issues  Keep leg elevated when laying down and sitting.  Compression stockings if you are able.  Recommend followup with your PCP within 1-2 weeks from discharge.     Discharge Meds     Medication List      START taking these medications     cephalexin 500 mg capsule; Commonly known as: Keflex; Take 1 capsule   (500 mg) by mouth 2 times a day for 7 days.     CONTINUE taking these medications     acetaminophen 500 mg tablet; Commonly known as: Tylenol   ADULTS MULTIVITAMIN ORAL   aspirin 81 mg EC tablet   EPINEPHrine 0.3 mg/0.3 mL injection syringe; Commonly known as: Epipen;   Inject 0.3 mL (0.3 mg) into the muscle 1 time if needed for anaphylaxis   for up to 6 doses. Use as directed for anaphylaxis reaction   fluticasone 50 mcg/actuation nasal spray; Commonly known as: Flonase   * FreeStyle Erika 14 Day Sensor kit; Generic drug: flash glucose sensor   kit; use as directed CHANGE EVERY 14 DAYS   * FreeStyle Erika 14 Day Sensor kit; Generic drug: flash glucose sensor   kit; Use as instructed change every 14 days   FreeStyle Erika 3 [place] misc; Generic drug:   blood-glucose,,cont; Use as instructed   FreeStyle Erika 3 Sensor device; Generic drug: blood-glucose sensor; 1   each every 14 (fourteen) days.   furosemide 40 mg tablet; Commonly known as: Lasix; Take 1 tablet (40 mg)   by mouth once daily as needed (edema).   hydrOXYzine pamoate 25 mg capsule; Commonly known as: Vistaril   insulin lispro 100 unit/mL pen; Commonly known as: HumaLOG KwikPen   Insulin; INJECT SUBCUTANEOUSLY UP  UNITS ONE TIME DAILY   lamoTRIgine 150 mg tablet; Commonly known as: LaMICtal   Lantus Solostar U-100 Insulin 100 unit/mL (3 mL) pen; Generic drug:   insulin  "glargine; INJECT UP TO 80 UNITS UNDER THE SKIN ONE TIME DAILY AS   DIRECTED   lisinopril 10 mg tablet; TAKE ONE TABLET BY MOUTH ONCE A  DAY   nebivolol 10 mg tablet; Commonly known as: Bystolic; Take 0.5 tablets (5   mg) by mouth once daily.   pramipexole 0.5 mg tablet; Commonly known as: Mirapex; TAKE 1 TABLET BY   MOUTH  IN THE MORNING  as needed and  2 before bedtime   QUEtiapine 200 mg tablet; Commonly known as: SEROquel   rosuvastatin 20 mg tablet; Commonly known as: Crestor; Take 1 tablet (20   mg) by mouth once daily.   sulfaSALAzine 500 mg tablet; Commonly known as: Azulfidine; Take 1   tablet (500 mg) by mouth 4 times a day.   Sure Comfort Pen Needle 32 gauge x 5/32\" needle; Generic drug: pen   needle, diabetic; Use 1 Pen needle 2 times a day.   tiZANidine 4 mg tablet; Commonly known as: Zanaflex; TAKE ONE TABLET BY   MOUTH AT BEDTIME AS NEEDED FOR MUSCLE SPAMS  * This list has 2 medication(s) that are the same as other medications   prescribed for you. Read the directions carefully, and ask your doctor or   other care provider to review them with you.     STOP taking these medications     albuterol 90 mcg/actuation inhaler; Commonly known as: Ventolin HFA     Test Results Pending At Discharge  Pending Labs       No current pending labs.          Hospital Course  Yessy Ortega is a 59 y.o. female w/ Hx of HTN, T2DM, hypercholesterolemia, lymphedema, bipolar disorder, cellulitis, MDD, arthritis, umbilical hernia s/p repair, JULIO C, diabetic retinopathy, morbid obesity, restless leg syndrome, gout, lumbar radiculopathy, ulcerative colitis who was admitted on 8/1/25 for acutely worsening LLE wound/pain w/ unclear etiology. Vascular US PVR w/o exercise was unremarkable and CT Left Tib/Fib show soft tissue swelling and skin thickening which could have a component of cellulitis and few scattered tiny calcifications could suggest a component of chronic venous stasis. Tissue/wound cx show rare PMN leukocytes, " moderate gram + cocci, and rare yeast. Low suspicion for infection as patient has no systemic signs of infection and WBC, ESR, CRP are wnl. However, patient is having worsening pain and unsure what is causing this in which ID was consulted and recommended starting cefazolin. Gen surg/wound care consulted. Patient continues to be afebrile and WBC wnl and reported improvement in sx. LLE wound/pain likely d/t irritation/contact dermatitis and low suspicion for infection. She is deemed medically stable for discharge. She was prescribed a course of Keflex 500 mg BID for 7 days. She is to follow w/ PCP after discharge. Referral to home health care for wound care provided.    Pertinent Physical Exam At Time of Discharge  Physical Exam  Vitals reviewed.   Constitutional:       General: She is not in acute distress.     Appearance: She is obese. She is not ill-appearing.   HENT:      Head: Normocephalic and atraumatic.     Eyes:      Extraocular Movements: Extraocular movements intact.       Cardiovascular:      Rate and Rhythm: Normal rate and regular rhythm.      Heart sounds: No murmur heard.  Pulmonary:      Effort: Pulmonary effort is normal. No respiratory distress.      Breath sounds: Normal breath sounds.   Abdominal:      Palpations: Abdomen is soft.      Tenderness: There is no abdominal tenderness.     Skin:     General: Skin is warm and dry.      Comments: LLE wrapped in Kerlix w/o active signs of infection such as drainage or increased warm.     Neurological:      Mental Status: She is alert and oriented to person, place, and time.     Psychiatric:         Mood and Affect: Mood normal.         Behavior: Behavior normal.     Outpatient Follow-Up  Future Appointments   Date Time Provider Department Center   8/11/2025  9:30 AM [facility]   8/19/2025 11:15 AM Clemente Collins MD [id number]   11/20/2025  9:00 AM Emely Garcia MD [id number]     Gunjan Owens DO  PGY-1

## 2025-08-05 NOTE — HH CARE COORDINATION
Home Care received a Referral for Nursing. We have processed the referral for a Start of Care within 48 hours of 08/06.     If you have any questions or concerns, please feel free to contact us at [phone]. Follow the prompts, enter your five digit zip code, and you will be directed to your care team on [facility].

## 2025-08-05 NOTE — NURSING NOTE
Discharge instructions reviewed with patient and family. No questions at this time. Education given for new homegoing medication with type, uses, and most common side effects. Patient verbalized understanding. Handout on cellulitis and wound care given. Masimo removed and left at bedside. IV removed. Discharged home in stable condition.

## 2025-08-06 ENCOUNTER — TELEPHONE (OUTPATIENT)
Dept: INTERNAL MEDICINE | Facility: HOSPITAL | Age: 60
End: 2025-08-06
Payer: COMMERCIAL

## 2025-08-06 DIAGNOSIS — R11.2 NAUSEA AND VOMITING, UNSPECIFIED VOMITING TYPE: Primary | ICD-10-CM

## 2025-08-06 RX ORDER — ONDANSETRON 8 MG/1
8 TABLET, FILM COATED ORAL EVERY 8 HOURS PRN
Qty: 20 TABLET | Refills: 0 | Status: SHIPPED | OUTPATIENT
Start: 2025-08-06 | End: 2025-08-13

## 2025-08-06 NOTE — DOCUMENTATION CLARIFICATION NOTE
"    PATIENT:               ASHER PRYOR  ACCT #:                  [id number]  MRN:                       [id number]  :                       1965  ADMIT DATE:       2025 6:54 AM  DISCH DATE:        2025 3:36 PM  RESPONDING PROVIDER #:        [id number]          PROVIDER RESPONSE TEXT:    Left lower extremity leg wound related to irritant contact dermatitis due to friction from compressive wrap    CDI QUERY TEXT:    Clarification        Instruction:    Based on your assessment of the patient and the clinical information, please provide the requested documentation by clicking on the appropriate radio button and enter any additional information if prompted.    Question: Please clarify if a relationship exists between    When answering this query, please exercise your independent professional judgment. The fact that a question is being asked, does not imply that any particular answer is desired or expected.    The patient's clinical indicators include:  Clinical Information: Patient presented to the ED with left leg wound of the tibia-fibular/shin area with intractable pain. Diabetic history noted.    Clinical Indicators:    - H&P vitals: Temp-96.6, HR-80, RR-18, BP-211/95, SpO2-99 percent    -H&P assessment/plan resident and attending sections: \"Left leg wound with unclear etiology. ddx include: local trauma vs pyoderma gangrenosum vs low suspicion for cellulitis. She does have some blistering and serous drainage from this lesion. Pain got worse after starting compressive ACE wrap, so this could be irritation to her chronic lesion\"    -25 Internal Medicine-hospital course note: \"Patient continues to be afebrile and WBC wnl and reported improvement in sx. LLE wound/pain likely d/t irritation/contact dermatitis and low suspicion for infection. She is deemed medically stable for discharge. She was prescribed a course of Keflex 500 mg BID for 7 days.\"    -25 and 25 ID notes: \"Left shin none healing " "ulcer / infection, the wound could be a combination of stasis and NLD\"    -Discharge summary-Discharge diagnosis/hospital course: \"Left lower extremity cellulitis, Chronic venous stasis of lower extremity, Chronic lymphedema, Possible contact dermatitis, Acute kidney injury. improvement in sx. LLE wound/pain likely d/t irritation/contact dermatitis and low suspicion for infection\"    Treatment: Leg elevation, compression stocking, IV ceFAZolin- 8/2/25-8/5/25, IV Dilaudid-8/1/25-8/4/25    Risk Factors: Obesity, chronic lymphedema, venous stasis, DM  Options provided:  -- Left lower extremity leg wound related to cellulitis associated with DM  -- Left lower extremity leg wound related to cellulitis not associated with DM  -- Left lower extremity leg wound related to irritant contact dermatitis due to friction from compressive wrap  -- Left lower extremity leg wound related to infected venous/stasis insufficiency  -- Left lower extremity leg wound related to NLD/ diabetes dermatitis  -- Other - I will add my own diagnosis  -- Refer to Clinical Documentation Reviewer    Query created by: Yue Allison on 8/6/2025 12:20 PM      Electronically signed by:  ELIZABET LYNN DO 8/6/2025 12:53 PM          "

## 2025-08-06 NOTE — TELEPHONE ENCOUNTER
Patient discharged from hospital yesterday.    Patient called today to checkup on patient to see how she is doing since discharge.  No response to phone call going to voicemail. Left voicemail message with callback number.  Will update note if patient returns phone call.    Travon Forrester DO  Hospitalist, [facility]    Patient returned phone call same day just around 1pm.  She states feeling okay. Had some nausea which is gone now. Leg not bothering her a lot. Keeping it up as she can and will ramp up activity level gradually based on discussion.  No other issues at home.  Offered to send script for medication (Zofran) for nausea if she has again as may be related to antibiotic. She appreciated that. Confirmed pharmacy with her. Sent prescription.  No questions to answer from her perspective.  Wished her well and to take care.

## 2025-08-07 ENCOUNTER — HOME CARE VISIT (OUTPATIENT)
Dept: HOME HEALTH SERVICES | Facility: HOME HEALTH | Age: 60
End: 2025-08-07
Payer: COMMERCIAL

## 2025-08-07 ENCOUNTER — PATIENT OUTREACH (OUTPATIENT)
Dept: PRIMARY CARE | Facility: CLINIC | Age: 60
End: 2025-08-07
Payer: COMMERCIAL

## 2025-08-07 PROCEDURE — G0299 HHS/HOSPICE OF RN EA 15 MIN: HCPCS

## 2025-08-07 NOTE — PROGRESS NOTES
TCM completed 08/07/25   Discharge Facility: [facility]  Discharge Diagnosis: Left lower extremity cellulitis, Chronic venous stasis of lower extremity, Chronic lymphedema, Possible contact dermatitis, Acute kidney injury  Admission Date: 8/1/25  Discharge Date: 8/5/25   Discharge Disposition: Home with [facility]    PCP Appointment Date: 11/20/25  Specialist Appointment Date:  Wound Clinic- 8/11/25    Hospital Encounter and Summary Linked: Yes  ED to Hosp-Admission (Discharged) with Travon Forrester DO; Devin Vanegas MD (08/01/2025)                                 Unable to reach patient; Two attempts were made to reach patient within two business days after discharge. Phone continued to ring- no answer and no voicemail to leave a message. No return call as of this note.  Needs seen by: 8/18/25.         Issues Requiring Follow-Up:  Followup with wound clinic Dr. Mosley for management of chronic leg issues  Keep leg elevated when laying down and sitting.  Compression stockings if you are able.  Recommend followup with your PCP within 1-2 weeks from discharge.

## 2025-08-08 VITALS
SYSTOLIC BLOOD PRESSURE: 146 MMHG | OXYGEN SATURATION: 95 % | BODY MASS INDEX: 47.84 KG/M2 | TEMPERATURE: 97.1 F | HEART RATE: 80 BPM | RESPIRATION RATE: 20 BRPM | DIASTOLIC BLOOD PRESSURE: 72 MMHG | WEIGHT: 260 LBS | HEIGHT: 62 IN

## 2025-08-08 PROCEDURE — RXMED WILLOW AMBULATORY MEDICATION CHARGE

## 2025-08-08 ASSESSMENT — ENCOUNTER SYMPTOMS
STOOL FREQUENCY: DAILY
PAIN LOCATION - PAIN QUALITY: DULL
APPETITE LEVEL: GOOD
PAIN LOCATION: LEFT LEG
CHANGE IN APPETITE: UNCHANGED
PAIN SEVERITY GOAL: 0/10
SLEEP QUALITY: ADEQUATE
LOWEST PAIN SEVERITY IN PAST 24 HOURS: 3/10
HIGHEST PAIN SEVERITY IN PAST 24 HOURS: 6/10
PAIN LOCATION - PAIN FREQUENCY: CONSTANT
AGGRESSION WITHIN DEFINED LIMITS: 1
ANGER WITHIN DEFINED LIMITS: 1
SUBJECTIVE PAIN PROGRESSION: GRADUALLY IMPROVING
PAIN LOCATION - PAIN SEVERITY: 6/10
BOWEL PATTERN NORMAL: 1
PERSON REPORTING PAIN: PATIENT
PAIN LOCATION - RELIEVING FACTORS: TYLENOL
PAIN: 1

## 2025-08-08 ASSESSMENT — ACTIVITIES OF DAILY LIVING (ADL)
ENTERING_EXITING_HOME: INDEPENDENT
OASIS_M1830: 05

## 2025-08-08 NOTE — CASE COMMUNICATION
Primary Reason for Home Care:wound assessment and care diabetic instruction   Skilled Needs: comprehensive nursing instruction medication instruction pain assessment and instruction diabetic instruction home safety instruction  Precautions:universal  Instruction provided: reviewed all meds schedule and purpose and potential side effects diabetic instruction and management  instructed on wound care to left leg  Patient response to inst ruction: verbalized understanding patient /cg independent with wound care  Patient and Caregiver instructed on plan of care and visit frequency.   Patient and Caregiver in agreement with plan of care and visit frequency.    Discipline Communication: md  Plan for next visit:general assessment medication instruction wound assessment and care diabetic  instruction and manangement    Medication Reconciliation:    Demonstrates Adherence : Ye s  Issues/Concerns:yes abvoe  Provider Notified of Issues/Concerns: Yes  Caregiver Notified of Issues/Concerns: yes  patient  response to instructions Verbalized Understanding  Pill bottles visualized during treatment Yes

## 2025-08-09 ENCOUNTER — PHARMACY VISIT (OUTPATIENT)
Dept: PHARMACY | Facility: CLINIC | Age: 60
End: 2025-08-09
Payer: COMMERCIAL

## 2025-08-11 ENCOUNTER — OFFICE VISIT (OUTPATIENT)
Dept: WOUND CARE | Facility: HOSPITAL | Age: 60
End: 2025-08-11
Payer: COMMERCIAL

## 2025-08-11 DIAGNOSIS — I89.0 LYMPHEDEMA OF BOTH LOWER EXTREMITIES: ICD-10-CM

## 2025-08-13 DIAGNOSIS — E11.9 DIABETES MELLITUS, TYPE II, INSULIN DEPENDENT (MULTI): Primary | ICD-10-CM

## 2025-08-13 DIAGNOSIS — Z79.4 DIABETES MELLITUS, TYPE II, INSULIN DEPENDENT (MULTI): Primary | ICD-10-CM

## 2025-08-14 ENCOUNTER — HOME CARE VISIT (OUTPATIENT)
Dept: HOME HEALTH SERVICES | Facility: HOME HEALTH | Age: 60
End: 2025-08-14
Payer: COMMERCIAL

## 2025-08-15 ENCOUNTER — TELEPHONE (OUTPATIENT)
Dept: PRIMARY CARE | Facility: CLINIC | Age: 60
End: 2025-08-15
Payer: COMMERCIAL

## 2025-08-18 ENCOUNTER — OFFICE VISIT (OUTPATIENT)
Dept: WOUND CARE | Facility: HOSPITAL | Age: 60
End: 2025-08-18
Payer: COMMERCIAL

## 2025-08-18 ENCOUNTER — TELEPHONE (OUTPATIENT)
Facility: CLINIC | Age: 60
End: 2025-08-18
Payer: COMMERCIAL

## 2025-08-18 DIAGNOSIS — Z79.4 TYPE 2 DIABETES MELLITUS WITH HYPERGLYCEMIA, WITH LONG-TERM CURRENT USE OF INSULIN: ICD-10-CM

## 2025-08-18 DIAGNOSIS — E11.65 TYPE 2 DIABETES MELLITUS WITH HYPERGLYCEMIA, WITH LONG-TERM CURRENT USE OF INSULIN: ICD-10-CM

## 2025-08-18 PROCEDURE — 99213 OFFICE O/P EST LOW 20 MIN: CPT | Performed by: SURGERY

## 2025-08-18 PROCEDURE — 99213 OFFICE O/P EST LOW 20 MIN: CPT

## 2025-08-18 RX ORDER — FLASH GLUCOSE SENSOR
KIT MISCELLANEOUS
Qty: 2 EACH | Refills: 11 | Status: SHIPPED | OUTPATIENT
Start: 2025-08-18 | End: 2025-08-19 | Stop reason: SDUPTHER

## 2025-08-19 ENCOUNTER — APPOINTMENT (OUTPATIENT)
Dept: ENDOCRINOLOGY | Facility: CLINIC | Age: 60
End: 2025-08-19
Payer: COMMERCIAL

## 2025-08-19 VITALS
WEIGHT: 263.2 LBS | BODY MASS INDEX: 48.14 KG/M2 | SYSTOLIC BLOOD PRESSURE: 173 MMHG | DIASTOLIC BLOOD PRESSURE: 78 MMHG | HEART RATE: 85 BPM

## 2025-08-19 DIAGNOSIS — E78.00 HYPERCHOLESTEREMIA: ICD-10-CM

## 2025-08-19 DIAGNOSIS — E11.65 TYPE 2 DIABETES MELLITUS WITH HYPERGLYCEMIA, WITH LONG-TERM CURRENT USE OF INSULIN: Primary | ICD-10-CM

## 2025-08-19 DIAGNOSIS — I10 BENIGN ESSENTIAL HYPERTENSION: ICD-10-CM

## 2025-08-19 DIAGNOSIS — Z79.4 TYPE 2 DIABETES MELLITUS WITH HYPERGLYCEMIA, WITH LONG-TERM CURRENT USE OF INSULIN: Primary | ICD-10-CM

## 2025-08-19 LAB — POC HEMOGLOBIN A1C: 7.4 % (ref 4.2–6.5)

## 2025-08-19 PROCEDURE — 4010F ACE/ARB THERAPY RXD/TAKEN: CPT | Performed by: INTERNAL MEDICINE

## 2025-08-19 PROCEDURE — 83036 HEMOGLOBIN GLYCOSYLATED A1C: CPT | Mod: QW | Performed by: INTERNAL MEDICINE

## 2025-08-19 PROCEDURE — 3051F HG A1C>EQUAL 7.0%<8.0%: CPT | Performed by: INTERNAL MEDICINE

## 2025-08-19 PROCEDURE — 1036F TOBACCO NON-USER: CPT | Performed by: INTERNAL MEDICINE

## 2025-08-19 PROCEDURE — 3078F DIAST BP <80 MM HG: CPT | Performed by: INTERNAL MEDICINE

## 2025-08-19 PROCEDURE — 3077F SYST BP >= 140 MM HG: CPT | Performed by: INTERNAL MEDICINE

## 2025-08-19 PROCEDURE — 99214 OFFICE O/P EST MOD 30 MIN: CPT | Performed by: INTERNAL MEDICINE

## 2025-08-19 RX ORDER — FLASH GLUCOSE SENSOR
KIT MISCELLANEOUS
Qty: 2 EACH | Refills: 11 | Status: SHIPPED | OUTPATIENT
Start: 2025-08-19

## 2025-08-19 RX ORDER — BLOOD-GLUCOSE,RECEIVER,CONT
EACH MISCELLANEOUS
Qty: 1 EACH | Refills: 0 | Status: SHIPPED | OUTPATIENT
Start: 2025-08-19

## 2025-08-19 RX ORDER — BLOOD-GLUCOSE SENSOR
EACH MISCELLANEOUS
Qty: 2 EACH | Refills: 11 | Status: SHIPPED | OUTPATIENT
Start: 2025-08-19

## 2025-08-19 ASSESSMENT — PAIN SCALES - GENERAL: PAINLEVEL_OUTOF10: 0-NO PAIN

## 2025-08-19 ASSESSMENT — ENCOUNTER SYMPTOMS
LOSS OF SENSATION IN FEET: 0
DEPRESSION: 0

## 2025-08-21 ENCOUNTER — PATIENT OUTREACH (OUTPATIENT)
Dept: PRIMARY CARE | Facility: CLINIC | Age: 60
End: 2025-08-21
Payer: COMMERCIAL

## 2025-08-21 ENCOUNTER — APPOINTMENT (OUTPATIENT)
Dept: HOME HEALTH SERVICES | Facility: HOME HEALTH | Age: 60
End: 2025-08-21
Payer: COMMERCIAL

## 2025-08-23 ENCOUNTER — HOME CARE VISIT (OUTPATIENT)
Dept: HOME HEALTH SERVICES | Facility: HOME HEALTH | Age: 60
End: 2025-08-23
Payer: COMMERCIAL

## 2025-08-23 DIAGNOSIS — E78.00 HYPERCHOLESTEREMIA: ICD-10-CM

## 2025-08-23 DIAGNOSIS — M51.360 DISCOGENIC SYNDROME, LUMBAR: ICD-10-CM

## 2025-08-24 RX ORDER — ROSUVASTATIN CALCIUM 20 MG/1
20 TABLET, COATED ORAL DAILY
Qty: 90 TABLET | Refills: 0 | Status: SHIPPED | OUTPATIENT
Start: 2025-08-24

## 2025-08-24 ASSESSMENT — ENCOUNTER SYMPTOMS
ANGER WITHIN DEFINED LIMITS: 1
AGGRESSION WITHIN DEFINED LIMITS: 1
SLEEP QUALITY: ADEQUATE

## 2025-08-25 ENCOUNTER — APPOINTMENT (OUTPATIENT)
Dept: WOUND CARE | Facility: HOSPITAL | Age: 60
End: 2025-08-25
Payer: COMMERCIAL

## 2025-08-25 RX ORDER — TIZANIDINE 4 MG/1
4 TABLET ORAL NIGHTLY PRN
Qty: 90 TABLET | Refills: 3 | Status: SHIPPED | OUTPATIENT
Start: 2025-08-25

## 2025-09-02 ENCOUNTER — OFFICE VISIT (OUTPATIENT)
Dept: WOUND CARE | Facility: HOSPITAL | Age: 60
End: 2025-09-02
Payer: COMMERCIAL

## 2025-09-02 PROCEDURE — 97602 WOUND(S) CARE NON-SELECTIVE: CPT | Mod: LT

## 2025-11-20 ENCOUNTER — APPOINTMENT (OUTPATIENT)
Dept: PRIMARY CARE | Facility: CLINIC | Age: 60
End: 2025-11-20
Payer: COMMERCIAL

## 2026-02-19 ENCOUNTER — APPOINTMENT (OUTPATIENT)
Facility: CLINIC | Age: 61
End: 2026-02-19
Payer: COMMERCIAL

## 2026-02-23 ENCOUNTER — APPOINTMENT (OUTPATIENT)
Facility: CLINIC | Age: 61
End: 2026-02-23
Payer: COMMERCIAL